# Patient Record
Sex: FEMALE | Race: WHITE | NOT HISPANIC OR LATINO | Employment: OTHER
[De-identification: names, ages, dates, MRNs, and addresses within clinical notes are randomized per-mention and may not be internally consistent; named-entity substitution may affect disease eponyms.]

---

## 2017-10-13 ENCOUNTER — HEALTH MAINTENANCE LETTER (OUTPATIENT)
Age: 37
End: 2017-10-13

## 2018-01-02 ENCOUNTER — MYC MEDICAL ADVICE (OUTPATIENT)
Dept: FAMILY MEDICINE | Facility: CLINIC | Age: 38
End: 2018-01-02

## 2018-01-02 DIAGNOSIS — O26.20 HABITUAL ABORTER, CURRENTLY PREGNANT: Primary | ICD-10-CM

## 2018-01-03 PROBLEM — O26.20 HABITUAL ABORTER, CURRENTLY PREGNANT: Status: ACTIVE | Noted: 2018-01-03

## 2018-01-03 NOTE — TELEPHONE ENCOUNTER
Patient just found out she is pregnant.  Does not have coverage through [facility] and will need to go to a different care system for the beginning of the pregnancy.  As of 3/1/2018, she states she will be transferring care back to [facility] as her insurance will be changing.  For now, she is requesting A prescription for progesterone vaginal suppositories.  She has had multiple miscarriages and when she has not used the progesterone vaginal suppositories, she has miscarried in the first trimester.  I did let her know, that it is no longer the standard of care, but with her history and increased anxiety surrounding a potential miscarriage if not using these, will go ahead and prescribe for her.  She will establish a first OB visit and begin care at [facility] and will then transfer care to [facility] in early March.    Electronically signed by Dorothy Alatorre PA-C  1/3/2018

## 2018-03-02 ENCOUNTER — ALLIED HEALTH/NURSE VISIT (OUTPATIENT)
Dept: FAMILY MEDICINE | Facility: CLINIC | Age: 38
End: 2018-03-02
Payer: COMMERCIAL

## 2018-03-02 VITALS
BODY MASS INDEX: 30.55 KG/M2 | SYSTOLIC BLOOD PRESSURE: 112 MMHG | DIASTOLIC BLOOD PRESSURE: 68 MMHG | WEIGHT: 196.25 LBS

## 2018-03-02 DIAGNOSIS — N91.2 AMENORRHEA: Primary | ICD-10-CM

## 2018-03-02 LAB — HCG UR QL: POSITIVE

## 2018-03-02 PROCEDURE — 99207 ZZC NO CHARGE NURSE ONLY: CPT

## 2018-03-02 PROCEDURE — 81025 URINE PREGNANCY TEST: CPT | Performed by: PHYSICIAN ASSISTANT

## 2018-03-02 RX ORDER — PRENATAL VIT/IRON FUM/FOLIC AC 27MG-0.8MG
1 TABLET ORAL DAILY
Qty: 1 TABLET | Refills: 0 | COMMUNITY
Start: 2018-03-02 | End: 2018-09-12

## 2018-03-02 NOTE — MR AVS SNAPSHOT
After Visit Summary   3/2/2018    Enid Cisneros    MRN: [id number]           Patient Information     Date Of Birth          1980        Visit Information        Provider Department      3/2/2018 10:30 AM  NURSE [facility]        Today's Diagnoses     Amenorrhea    -  1       Follow-ups after your visit        Who to contact     If you have questions or need follow up information about today's clinic visit or your schedule please contact [facility] directly at [phone].  Normal or non-critical lab and imaging results will be communicated to you by [employer], letter or phone within 4 business days after the clinic has received the results. If you do not hear from us within 7 days, please contact the clinic through [employer] or phone. If you have a critical or abnormal lab result, we will notify you by phone as soon as possible.  Submit refill requests through [employer] or call your pharmacy and they will forward the refill request to us. Please allow 3 business days for your refill to be completed.          Additional Information About Your Visit        MyChart Information     [employer] gives you secure access to your electronic health record. If you see a primary care provider, you can also send messages to your care team and make appointments. If you have questions, please call your primary care clinic.  If you do not have a primary care provider, please call [phone] and they will assist you.        Care EveryWhere ID     This is your Care EveryWhere ID. This could be used by other organizations to access your [facility] medical records  [id number]        Your Vitals Were     Last Period Breastfeeding? BMI (Body Mass Index)             11/23/2017 (Exact Date) No 30.55 kg/m2          Blood Pressure from Last 3 Encounters:   03/02/18 112/68   08/06/15 112/64   04/09/15 104/60    Weight from Last 3 Encounters:   03/02/18 196 lb 4 oz (89 kg)   08/06/15 186 lb (84.4  kg)   04/09/15 186 lb (84.4 kg)              We Performed the Following     HCG qualitative urine        Primary Care Provider Office Phone # Fax #    Dorothy Alatorre PA-C [phone] [address] [place] [address]        Equal Access to Services     [facility]: Hadii aad ku hadasho Soomaali, waaxda luqadaha, qaybta kaalmada adeegyada, waxay idiin hayaan adeeg khkendellsh la'allenn . So [facility] [phone].    ATENCIÓN: Si habla español, tiene a hines disposición servicios gratuitos de asistencia lingüística. Jenna al [phone].    We comply with applicable federal civil rights laws and [place] laws. We do not discriminate on the basis of race, color, national origin, age, disability, sex, sexual orientation, or gender identity.            Thank you!     Thank you for choosing [facility]  for your care. Our goal is always to provide you with excellent care. Hearing back from our patients is one way we can continue to improve our services. Please take a few minutes to complete the written survey that you may receive in the mail after your visit with us. Thank you!             Your Updated Medication List - Protect others around you: Learn how to safely use, store and throw away your medicines at www.disposemymeds.org.          This list is accurate as of 3/2/18 11:08 AM.  Always use your most recent med list.                   Brand Name Dispense Instructions for use Diagnosis    prenatal multivitamin plus iron 27-0.8 MG Tabs per tablet     1 tablet    Take 1 tablet by mouth daily    Amenorrhea

## 2018-03-05 ENCOUNTER — PRENATAL OFFICE VISIT (OUTPATIENT)
Dept: FAMILY MEDICINE | Facility: CLINIC | Age: 38
End: 2018-03-05
Payer: COMMERCIAL

## 2018-03-05 DIAGNOSIS — Z32.01 PREGNANCY, CONFIRMED, NOT FIRST: Primary | ICD-10-CM

## 2018-03-05 PROCEDURE — 99207 ZZC NO CHARGE NURSE ONLY: CPT

## 2018-03-05 NOTE — MR AVS SNAPSHOT
After Visit Summary   3/5/2018    Enid Cisneros    MRN: [id number]           Patient Information     Date Of Birth          1980        Visit Information        Provider Department      3/5/2018 1:00 PM NL OB INTAKE [facility]        Today's Diagnoses     Pregnancy, confirmed, not first    -  1       Follow-ups after your visit        Your next 10 appointments already scheduled     Mar 12, 2018  1:00 PM CDT   US OB < 14 WEEKS SINGLE with PHUS1   [facility] ([facility])    [address] [place] [address] [id number] [phone]           Please bring a list of your medicines (including vitamins, minerals and over-the-counter drugs). Also, tell your doctor about any allergies you may have. Wear comfortable clothes and leave your valuables at home.  If you re less than 20 weeks drink four 8-ounce glasses of fluid an hour before your exam. If you need to empty your bladder before your exam, try to release only a little urine. Then, drink another glass of fluid.  You may have up to two family members in the exam room. If you bring a small child, an adult must be there to care for him or her.  Please call the Imaging Department at your exam site with any questions.            Mar 12, 2018  1:45 PM CDT   LAB with NL LAB PMC   [facility]    [address] [place] [id number] [phone]           Please do not eat 10-12 hours before your appointment if you are coming in fasting for labs on lipids, cholesterol, or glucose (sugar). This does not apply to pregnant women. Water, hot tea and black coffee (with nothing added) are okay. Do not drink other fluids, diet soda or chew gum.            Mar 13, 2018  9:00 AM CDT   New Prenatal with Dorothy Alatorre PA-C   [facility]    [address] [place] [address] [id number] [phone]               Future tests that were ordered for you today     Open Standing Orders        Priority Remaining Interval Expires Ordered    Maternal quad screen Routine 1/1  5/1/2018 3/5/2018    Anti treponema EIA Routine 1/1  5/1/2018 3/5/2018    HIV Antigen Antibody Combo Routine 1/1  5/1/2018 3/5/2018    HEPATITIS B SURFACE ANTIGEN Routine 1/1  5/1/2018 3/5/2018    CBC WITH PLATELETS Routine 1/1  5/1/2018 3/5/2018    Rubella Antibody IgG Quantitative Routine 1/1  5/1/2018 3/5/2018    *UA reflex to Microscopic and Culture ([place]; [facility]; [facility]; [facility]; [facility]; [facility]; [place]; [place]) Routine 1/1  5/1/2018 3/5/2018    ABO/Rh type and screen Routine 1/1  5/1/2018 3/5/2018          Open Future Orders        Priority Expected Expires Ordered    US OB < 14 Weeks Single Routine 3/5/2018 3/5/2019 3/5/2018            Who to contact     If you have questions or need follow up information about today's clinic visit or your schedule please contact [facility] directly at [phone].  Normal or non-critical lab and imaging results will be communicated to you by [employer], letter or phone within 4 business days after the clinic has received the results. If you do not hear from us within 7 days, please contact the clinic through [employer] or phone. If you have a critical or abnormal lab result, we will notify you by phone as soon as possible.  Submit refill requests through [employer] or call your pharmacy and they will forward the refill request to us. Please allow 3 business days for your refill to be completed.          Additional Information About Your Visit        [employer] Information     [employer] gives you secure access to your electronic health record. If you see a primary care provider, you can also send messages to your care team and make appointments. If you have questions, please call your primary care clinic.  If you do not have a primary care provider, please call  [phone] and they will assist you.        Care EveryWhere ID     This is your Care EveryWhere ID. This could be used by other organizations to access your [facility] medical records  [id number]        Your Vitals Were     Last Period                   11/23/2017 (Exact Date)            Blood Pressure from Last 3 Encounters:   03/02/18 112/68   08/06/15 112/64   04/09/15 104/60    Weight from Last 3 Encounters:   03/02/18 196 lb 4 oz (89 kg)   08/06/15 186 lb (84.4 kg)   04/09/15 186 lb (84.4 kg)               Primary Care Provider Office Phone # Fax #    Dorothy Alatorre PA-C [phone] [address] [place] [address]        Equal Access to Services     [facility] [facility] : Hadii vianney sue hadasho Soomaali, waaxda luqadaha, qaybta kaalmada adeegyada, anjana salazar . So [facility] [phone].    ATENCIÓN: Si habla español, tiene a hines disposición servicios gratuitos de asistencia lingüística. Llame al [phone].    We comply with applicable federal civil rights laws and [place] laws. We do not discriminate on the basis of race, color, national origin, age, disability, sex, sexual orientation, or gender identity.            Thank you!     Thank you for choosing [facility]  for your care. Our goal is always to provide you with excellent care. Hearing back from our patients is one way we can continue to improve our services. Please take a few minutes to complete the written survey that you may receive in the mail after your visit with us. Thank you!             Your Updated Medication List - Protect others around you: Learn how to safely use, store and throw away your medicines at www.disposemymeds.org.          This list is accurate as of 3/5/18  2:59 PM.  Always use your most recent med list.                   Brand Name Dispense Instructions for use Diagnosis    prenatal multivitamin plus iron 27-0.8 MG Tabs per tablet     1 tablet    Take 1 tablet by  mouth daily    Amenorrhea

## 2018-03-05 NOTE — PROGRESS NOTES
1. Have you had chicken pox?   Yes    Genetic Screening    Has the patient, baby's father, or anyone in either family had:     1. Thalassemia and and MCV result less than 80?No   2.  Neural tube defect? No   3.  Congenital heart defect?No   4. Down's syndrome?No   5.  Rinku-Sachs disease?No   6. Sickle Cell disease or trait?No   7. Hemophilia or other inherited problems of blood?No   8. Muscular dystropy?No   9.  Cystic fibrosis?No  10. Wellington's Chorea?No  11. Mental Retardation or autism?  No         If yes, was the person tested for Fragile X?   12. Any other inherited genetic or chromosomal disorders?No  13. Metabolic disorders such as diabetes or PKU?No  14. A child born with defects not listed above?No  15. Recurrent pregnancy loss or stillbirth?No  16.  Has the patient had any medications/street drugs/alcohol since her last menstrual period?No  18.  Does the patient or baby's father have any other genetic risks. No

## 2018-03-08 ENCOUNTER — PRENATAL OFFICE VISIT (OUTPATIENT)
Dept: FAMILY MEDICINE | Facility: CLINIC | Age: 38
End: 2018-03-08
Payer: COMMERCIAL

## 2018-03-08 VITALS
WEIGHT: 194 LBS | RESPIRATION RATE: 18 BRPM | BODY MASS INDEX: 30.45 KG/M2 | HEIGHT: 67 IN | SYSTOLIC BLOOD PRESSURE: 116 MMHG | HEART RATE: 82 BPM | DIASTOLIC BLOOD PRESSURE: 72 MMHG | OXYGEN SATURATION: 100 % | TEMPERATURE: 97.9 F

## 2018-03-08 DIAGNOSIS — Z34.80 SUPERVISION OF OTHER NORMAL PREGNANCY, ANTEPARTUM: Primary | ICD-10-CM

## 2018-03-08 DIAGNOSIS — Z34.92 ENCOUNTER FOR SUPERVISION OF NORMAL PREGNANCY IN SECOND TRIMESTER: ICD-10-CM

## 2018-03-08 LAB
ABO + RH BLD: NORMAL
ABO + RH BLD: NORMAL
ALBUMIN UR-MCNC: NEGATIVE MG/DL
APPEARANCE UR: ABNORMAL
BACTERIA #/AREA URNS HPF: ABNORMAL /HPF
BILIRUB UR QL STRIP: NEGATIVE
BLD GP AB SCN SERPL QL: NORMAL
BLOOD BANK CMNT PATIENT-IMP: NORMAL
COLOR UR AUTO: YELLOW
ERYTHROCYTE [DISTWIDTH] IN BLOOD BY AUTOMATED COUNT: 13.3 % (ref 10–15)
GLUCOSE UR STRIP-MCNC: NEGATIVE MG/DL
HCT VFR BLD AUTO: 39.4 % (ref 35–47)
HGB BLD-MCNC: 12.8 G/DL (ref 11.7–15.7)
HGB UR QL STRIP: NEGATIVE
KETONES UR STRIP-MCNC: 5 MG/DL
LEUKOCYTE ESTERASE UR QL STRIP: ABNORMAL
MCH RBC QN AUTO: 30.2 PG (ref 26.5–33)
MCHC RBC AUTO-ENTMCNC: 32.5 G/DL (ref 31.5–36.5)
MCV RBC AUTO: 93 FL (ref 78–100)
MUCOUS THREADS #/AREA URNS LPF: PRESENT /LPF
NITRATE UR QL: NEGATIVE
PH UR STRIP: 6 PH (ref 5–7)
PLATELET # BLD AUTO: 207 10E9/L (ref 150–450)
RBC # BLD AUTO: 4.24 10E12/L (ref 3.8–5.2)
RBC #/AREA URNS AUTO: 1 /HPF (ref 0–2)
SOURCE: ABNORMAL
SP GR UR STRIP: 1.02 (ref 1–1.03)
SPECIMEN EXP DATE BLD: NORMAL
SQUAMOUS #/AREA URNS AUTO: 8 /HPF (ref 0–1)
UROBILINOGEN UR STRIP-MCNC: 0 MG/DL (ref 0–2)
WBC # BLD AUTO: 10.3 10E9/L (ref 4–11)
WBC #/AREA URNS AUTO: 4 /HPF (ref 0–5)

## 2018-03-08 PROCEDURE — 86901 BLOOD TYPING SEROLOGIC RH(D): CPT | Performed by: FAMILY MEDICINE

## 2018-03-08 PROCEDURE — 87591 N.GONORRHOEAE DNA AMP PROB: CPT | Performed by: PHYSICIAN ASSISTANT

## 2018-03-08 PROCEDURE — 99207 ZZC FIRST OB VISIT: CPT | Performed by: PHYSICIAN ASSISTANT

## 2018-03-08 PROCEDURE — 87624 HPV HI-RISK TYP POOLED RSLT: CPT | Performed by: PHYSICIAN ASSISTANT

## 2018-03-08 PROCEDURE — 36415 COLL VENOUS BLD VENIPUNCTURE: CPT | Performed by: FAMILY MEDICINE

## 2018-03-08 PROCEDURE — 87086 URINE CULTURE/COLONY COUNT: CPT | Performed by: PHYSICIAN ASSISTANT

## 2018-03-08 PROCEDURE — 86762 RUBELLA ANTIBODY: CPT | Performed by: FAMILY MEDICINE

## 2018-03-08 PROCEDURE — 87491 CHLMYD TRACH DNA AMP PROBE: CPT | Performed by: PHYSICIAN ASSISTANT

## 2018-03-08 PROCEDURE — 81001 URINALYSIS AUTO W/SCOPE: CPT | Performed by: FAMILY MEDICINE

## 2018-03-08 PROCEDURE — 86780 TREPONEMA PALLIDUM: CPT | Performed by: FAMILY MEDICINE

## 2018-03-08 PROCEDURE — 87389 HIV-1 AG W/HIV-1&-2 AB AG IA: CPT | Performed by: FAMILY MEDICINE

## 2018-03-08 PROCEDURE — 85027 COMPLETE CBC AUTOMATED: CPT | Performed by: FAMILY MEDICINE

## 2018-03-08 PROCEDURE — 87340 HEPATITIS B SURFACE AG IA: CPT | Performed by: FAMILY MEDICINE

## 2018-03-08 PROCEDURE — G0145 SCR C/V CYTO,THINLAYER,RESCR: HCPCS | Performed by: PHYSICIAN ASSISTANT

## 2018-03-08 PROCEDURE — 86850 RBC ANTIBODY SCREEN: CPT | Performed by: FAMILY MEDICINE

## 2018-03-08 PROCEDURE — 86900 BLOOD TYPING SEROLOGIC ABO: CPT | Performed by: FAMILY MEDICINE

## 2018-03-08 ASSESSMENT — PAIN SCALES - GENERAL: PAINLEVEL: NO PAIN (0)

## 2018-03-08 NOTE — PROGRESS NOTES
Enid Cisneros is a 37 year old  who presents to the clinic for an new ob visit.         Estimated Date of Delivery: Aug 30, 2018    FIRST OB LABS RETURNED hasn't done them yet   EARLY US WAS DONE AND RETURNED NORMAL CONFIRMING BEVERLY NOTED ABOVE.     She has not had bleeding since her LMP.   She has had mild nausea. Weigh loss has not occurred.   This was not a planned pregnancy.   OTHER CONCERNS: none   STI HISTORY : no         Patient Active Problem List   Diagnosis     CARDIOVASCULAR SCREENING; LDL GOAL LESS THAN 160     Mass     PAC (premature atrial contraction)     PVC (premature ventricular contraction)     Habitual aborter, currently pregnant     Past Medical History:   Diagnosis Date     Closed fracture of unspecified part of humerus 1981    fx of radius     Lyme disease     history of     PAC (premature atrial contraction) 8/2013    Holter     PVC (premature ventricular contraction) 8/2013    Holter     Past Surgical History:   Procedure Laterality Date     LAMINECT/DISCECTOMY, LUMBAR  2002     TONSILLECTOMY  2009     Current Outpatient Prescriptions   Medication Sig Dispense Refill     Prenatal Vit-Fe Fumarate-FA (PRENATAL MULTIVITAMIN PLUS IRON) 27-0.8 MG TABS per tablet Take 1 tablet by mouth daily 1 tablet 0       ====================================================  PERSONAL/SOCIAL HISTORY  Social History     Social History     Marital status:      Spouse name: Didier     Number of children: 1     Years of education: N/A     Occupational History      Unemployed     Social History Main Topics     Smoking status: Never Smoker     Smokeless tobacco: Never Used     Alcohol use No     Drug use: No     Sexual activity: Yes     Partners: Male     Other Topics Concern      Service No     Blood Transfusions No     Caffeine Concern No     Occupational Exposure No     Stay-at-home mom     Hobby Hazards No     Sleep Concern No     Stress Concern No     Weight Concern No     Special Diet No     Back  Care Yes     Herniated disc/discectomy - 2002     Exercise Yes     3-4 times a week     Seat Belt Yes     Self-Exams No     Social History Narrative     and lives in [place] with , Didier and daughters, Margaret and Lamar.  No smokers in the home.  Outdoor cats.  No concerns about domestic violence.     =====================================================   REVIEW OF SYSTEMS  C: NEGATIVE for fever, chills, change in weight  I: NEGATIVE for worrisome rashes, moles or lesions  E: NEGATIVE for vision changes or irritation  ENT: NEGATIVE for ear, mouth and throat problems  R: NEGATIVE for significant cough or SOB  B: NEGATIVE for masses, tenderness or discharge  CV: NEGATIVE for chest pain, palpitations or peripheral edema  GI: NEGATIVE for nausea, abdominal pain, heartburn, or change in bowel habits  : NEGATIVE for unusual urinary or vaginal symptoms. Periods are regular.  M: NEGATIVE for significant arthralgias or myalgia  N: NEGATIVE for weakness, dizziness or paresthesias  E: NEGATIVE for temperature intolerance, skin/hair changes  H: NEGATIVE for bleeding problems  P: NEGATIVE for changes in mood or affect  ====================================================  PHYSICAL EXAM:  LMP 11/23/2017 (Exact Date)        GENERAL:  Pleasant pregnant female, alert, well groomed.  SKIN:  Warm and dry, without lesions or rashes  HEAD: Symmetrical features.  EYES:  PERRLA,   MOUTH:  Buccal mucosa pink, moist without lesions.    NECK:  Thyroid without enlargement and nodules.  Lymph nodes not palpable.   LUNGS:  Clear to auscultation.  BREAST: patient declined breast exam      HEART:  RRR without murmur.  ABDOMEN: Soft without masses , tenderness or organomegaly.  No CVA tenderness. No scars noted..   MUSCULOSKELETAL:  Full range of motion  EXTREMITIES:  No edema. No significant varicosities.   GENITALIA:  BUS WNL, no lesions noted   VAGINA:  Pink, normal rugae and discharge normal and physiologic,    CERVIX:  smooth, without discharge or CMT and parous os,   firm/ closed 2 cm long.  PELVIC: Deferred due to recent ultrasound.      =========================================  ASSESSMENT/PLAN  Supervision of other normal pregnancy, antepartum        RECOMMENDED WEIGHT GAIN: 15-25 lbs.  Given her age, it was suggested to consider nuchal she declined.  Will move forward with maternal quad study.  She is aware that there is higher risk particularly and trisomy abnormality as she is greater than age 35.  Instructed on best evidence for: weight gain for her BMI for pregnancy; healthy diet and foods to avoid; exercise and activity during pregnancy;avoiding exposure to toxoplasmosis; and maintenance of a generally healthy lifestyle.   Discussed the harms, benefits, side effects and alternative therapies for current prescribed and OTC medications.    Will see her again in 4 weeks - she should consider maternal quad testing at that time.     No orders of the defined types were placed in this encounter.        AVS given to patient upon discharge today.  Electronically signed by Dorothy Alatorre PA-C  March 8, 2018  3:27 PM

## 2018-03-08 NOTE — NURSING NOTE
"Chief Complaint   Patient presents with     Prenatal Care       Initial /72  Pulse 82  Temp 97.9  F (36.6  C) (Tympanic)  Resp 18  Ht 5' 7\" (1.702 m)  Wt 194 lb (88 kg)  LMP 11/23/2017 (Exact Date)  SpO2 100%  BMI 30.38 kg/m2 Estimated body mass index is 30.38 kg/(m^2) as calculated from the following:    Height as of this encounter: 5' 7\" (1.702 m).    Weight as of this encounter: 194 lb (88 kg).  BP completed using cuff size: manda Arevalo MA      "

## 2018-03-08 NOTE — MR AVS SNAPSHOT
After Visit Summary   3/8/2018    Enid Cisneros    MRN: [id number]           Patient Information     Date Of Birth          1980        Visit Information        Provider Department      3/8/2018 3:30 PM Dorothy Alatorre PA-C [facility]        Today's Diagnoses     Supervision of other normal pregnancy, antepartum    -  1       Follow-ups after your visit        Your next 10 appointments already scheduled     Mar 12, 2018  1:00 PM CDT   US OB < 14 WEEKS SINGLE with PHUS1   [facility] Ultrasound ([facility])    [address] [place] [address]   [phone]           Please bring a list of your medicines (including vitamins, minerals and over-the-counter drugs). Also, tell your doctor about any allergies you may have. Wear comfortable clothes and leave your valuables at home.  If you re less than 20 weeks drink four 8-ounce glasses of fluid an hour before your exam. If you need to empty your bladder before your exam, try to release only a little urine. Then, drink another glass of fluid.  You may have up to two family members in the exam room. If you bring a small child, an adult must be there to care for him or her.  Please call the Imaging Department at your exam site with any questions.            Mar 12, 2018  1:45 PM CDT   LAB with NL LAB [facility] ([facility])    [address] [place] [id number] [phone]           Please do not eat 10-12 hours before your appointment if you are coming in fasting for labs on lipids, cholesterol, or glucose (sugar). This does not apply to pregnant women. Water, hot tea and black coffee (with nothing added) are okay. Do not drink other fluids, diet soda or chew gum.            Apr 11, 2018 10:30 AM CDT   ESTABLISHED PRENATAL with Dorothy Alatorre PA-C   [facility] ([facility])    [address] [place] [address]  [address]   [phone]            May 09, 2018 10:00 AM CDT   ESTABLISHED PRENATAL with Dorothy Alatorre PA-C   [facility] ([facility])    [address] [place] [address]   [phone]            Jun 06, 2018 10:30 AM CDT   ESTABLISHED PRENATAL with Dorothy Alatorre PA-C   [facility] ([facility])    [address] [place] [address]   [phone]            Jun 20, 2018 10:00 AM CDT   ESTABLISHED PRENATAL with Dorothy Alatorre PA-C   [facility] ([facility])    [address] [place] [address]   [phone]            Jul 05, 2018 10:00 AM CDT   ESTABLISHED PRENATAL with Dorothy Alatorre PA-C   [facility] ([facility])    [address] [place] [address]   [phone]            Jul 18, 2018 10:00 AM CDT   ESTABLISHED PRENATAL with Dorothy Alatorre PA-C   [facility] ([facility])    [address] [place] [address]   [phone]            Aug 01, 2018 10:00 AM CDT   ESTABLISHED PRENATAL with Dorothy Alatorre PA-C   [facility] ([facility])    [address] [place] [address]   [phone]            Aug 08, 2018 10:00 AM CDT   ESTABLISHED PRENATAL with Dorothy Alatorre PA-C   [facility] ([facility])    [address] [place] [address]   [phone]              Future tests that were ordered for you today     Open Future Orders        Priority Expected Expires Ordered    Anti Treponema Routine  6/8/2018 3/8/2018    OB hemoglobin Routine  6/8/2018 3/8/2018    Glucose tolerance, gest screen, 1 hour Routine  6/8/2018 3/8/2018            Who to contact     If you have questions or need follow up information about today's clinic visit or your schedule please contact [facility]  "directly at [phone].  Normal or non-critical lab and imaging results will be communicated to you by MyChart, letter or phone within 4 business days after the clinic has received the results. If you do not hear from us within 7 days, please contact the clinic through [employer] or phone. If you have a critical or abnormal lab result, we will notify you by phone as soon as possible.  Submit refill requests through [employer] or call your pharmacy and they will forward the refill request to us. Please allow 3 business days for your refill to be completed.          Additional Information About Your Visit        [employer] Information     [employer] gives you secure access to your electronic health record. If you see a primary care provider, you can also send messages to your care team and make appointments. If you have questions, please call your primary care clinic.  If you do not have a primary care provider, please call [phone] and they will assist you.        Care EveryWhere ID     This is your Care EveryWhere ID. This could be used by other organizations to access your [facility] medical records  [id number]        Your Vitals Were     Pulse Temperature Respirations Height Last Period Pulse Oximetry    82 97.9  F (36.6  C) (Tympanic) 18 5' 7\" (1.702 m) 11/23/2017 (Exact Date) 100%    BMI (Body Mass Index)                   30.38 kg/m2            Blood Pressure from Last 3 Encounters:   03/08/18 116/72   03/02/18 112/68   08/06/15 112/64    Weight from Last 3 Encounters:   03/08/18 194 lb (88 kg)   03/02/18 196 lb 4 oz (89 kg)   08/06/15 186 lb (84.4 kg)              We Performed the Following     *UA reflex to Microscopic and Culture ([place]; [facility]; [facility]; [facility]; [facility]; [facility]; [place]; [place])     ABO/Rh type and screen     Anti treponema EIA     ARUP Miscellaneous Test     CBC WITH PLATELETS     Chlamydia trachomatis PCR     HEPATITIS B SURFACE ANTIGEN     HIV Antigen " Antibody Combo     HPV High Risk Types DNA Cervical     Neisseria gonorrhoeae PCR     Pap imaged thin layer screen with HPV - recommended age 30 - 65 years (select HPV order below)     Rubella Antibody IgG Quantitative     Urine Culture Aerobic Bacterial        Primary Care Provider Office Phone # Fax #    Dorothy Alatorre PA-C [phone] [address] [place] [address]        Equal Access to Services     [facility] : Hadii aad ku hadasho Soomaali, waaxda luqadaha, qaybta kaalmada adeegyada, waxay idiin hayaan adeeg kharash la'aan . So [facility] [phone].    ATENCIÓN: Si habla español, tiene a hines disposición servicios gratuitos de asistencia lingüística. Llame al [phone].    We comply with applicable federal civil rights laws and [place] laws. We do not discriminate on the basis of race, color, national origin, age, disability, sex, sexual orientation, or gender identity.            Thank you!     Thank you for choosing [facility]  for your care. Our goal is always to provide you with excellent care. Hearing back from our patients is one way we can continue to improve our services. Please take a few minutes to complete the written survey that you may receive in the mail after your visit with us. Thank you!             Your Updated Medication List - Protect others around you: Learn how to safely use, store and throw away your medicines at www.disposemymeds.org.          This list is accurate as of 3/8/18  5:49 PM.  Always use your most recent med list.                   Brand Name Dispense Instructions for use Diagnosis    prenatal multivitamin plus iron 27-0.8 MG Tabs per tablet     1 tablet    Take 1 tablet by mouth daily    Amenorrhea

## 2018-03-09 LAB
BACTERIA SPEC CULT: NO GROWTH
C TRACH DNA SPEC QL NAA+PROBE: NEGATIVE
HBV SURFACE AG SERPL QL IA: NONREACTIVE
HIV 1+2 AB+HIV1 P24 AG SERPL QL IA: NONREACTIVE
Lab: NORMAL
N GONORRHOEA DNA SPEC QL NAA+PROBE: NEGATIVE
RUBV IGG SERPL IA-ACNC: 94 IU/ML
SPECIMEN SOURCE: NORMAL
T PALLIDUM IGG+IGM SER QL: NEGATIVE

## 2018-03-09 NOTE — NURSING NOTE
20 week US set up on Wednesday, April 18th, check in at 10:15 am for US at 10:30 am. Sandy Bernard LPN

## 2018-03-12 ENCOUNTER — HOSPITAL ENCOUNTER (OUTPATIENT)
Dept: ULTRASOUND IMAGING | Facility: CLINIC | Age: 38
Discharge: HOME | End: 2018-03-12
Attending: PHYSICIAN ASSISTANT | Admitting: PHYSICIAN ASSISTANT
Payer: COMMERCIAL

## 2018-03-12 DIAGNOSIS — Z34.80 SUPERVISION OF OTHER NORMAL PREGNANCY, ANTEPARTUM: Primary | ICD-10-CM

## 2018-03-12 DIAGNOSIS — Z34.80 SUPERVISION OF OTHER NORMAL PREGNANCY, ANTEPARTUM: ICD-10-CM

## 2018-03-12 DIAGNOSIS — Z32.01 PREGNANCY, CONFIRMED, NOT FIRST: ICD-10-CM

## 2018-03-12 PROBLEM — O44.42 LOW-LYING PLACENTA IN SECOND TRIMESTER: Status: ACTIVE | Noted: 2018-03-12

## 2018-03-12 LAB
COPATH REPORT: NORMAL
GLUCOSE 1H P 50 G GLC PO SERPL-MCNC: 93 MG/DL (ref 60–129)
HGB BLD-MCNC: 12.4 G/DL (ref 11.7–15.7)
PAP: NORMAL

## 2018-03-12 PROCEDURE — 86780 TREPONEMA PALLIDUM: CPT | Performed by: PHYSICIAN ASSISTANT

## 2018-03-12 PROCEDURE — 00000218 ZZHCL STATISTIC OBHBG - HEMOGLOBIN: Performed by: PHYSICIAN ASSISTANT

## 2018-03-12 PROCEDURE — 76815 OB US LIMITED FETUS(S): CPT

## 2018-03-12 PROCEDURE — 36415 COLL VENOUS BLD VENIPUNCTURE: CPT | Performed by: PHYSICIAN ASSISTANT

## 2018-03-12 PROCEDURE — 82950 GLUCOSE TEST: CPT | Performed by: PHYSICIAN ASSISTANT

## 2018-03-13 LAB — T PALLIDUM IGG+IGM SER QL: NEGATIVE

## 2018-03-14 LAB
FINAL DIAGNOSIS: NORMAL
HPV HR 12 DNA CVX QL NAA+PROBE: NEGATIVE
HPV16 DNA SPEC QL NAA+PROBE: NEGATIVE
HPV18 DNA SPEC QL NAA+PROBE: NEGATIVE
SPECIMEN DESCRIPTION: NORMAL
SPECIMEN SOURCE CVX/VAG CYTO: NORMAL

## 2018-03-14 NOTE — PROGRESS NOTES
Enid, all your lab tests are normal.   We can review them in detail your next visit.   Please call or mychart with any questions.   Holli Meng MD

## 2018-03-16 LAB
RESULT: NORMAL
SEND OUTS MISC TEST CODE: NORMAL
SEND OUTS MISC TEST SPECIMEN: NORMAL
TEST NAME: NORMAL

## 2018-03-31 ENCOUNTER — OFFICE VISIT (OUTPATIENT)
Dept: URGENT CARE | Facility: RETAIL CLINIC | Age: 38
End: 2018-03-31
Payer: COMMERCIAL

## 2018-03-31 VITALS
HEART RATE: 96 BPM | TEMPERATURE: 96.9 F | DIASTOLIC BLOOD PRESSURE: 65 MMHG | SYSTOLIC BLOOD PRESSURE: 111 MMHG | OXYGEN SATURATION: 99 %

## 2018-03-31 DIAGNOSIS — J01.00 ACUTE NON-RECURRENT MAXILLARY SINUSITIS: Primary | ICD-10-CM

## 2018-03-31 DIAGNOSIS — J00 COMMON COLD: ICD-10-CM

## 2018-03-31 DIAGNOSIS — R05.9 COUGH: ICD-10-CM

## 2018-03-31 PROCEDURE — 99213 OFFICE O/P EST LOW 20 MIN: CPT | Performed by: NURSE PRACTITIONER

## 2018-03-31 NOTE — PROGRESS NOTES
"    SUBJECTIVE:  Enid Cisneros is a 38 year old female who presents to the clinic today with a chief complaint of cough, shortness of breath and tightness for 2-3 day(s).  Her cough is described as productive of yellow sputum and spasmodic.    The patient's symptoms are moderate and not changing over the course of time.  Associated symptoms include congestion, mild fever, nasal congestion, rhinorrhea, malaise, shortness of breath, sore throat, headache and \"feels hard to breath\". The patient's symptoms are exacerbated by cold air and exercise.  Patient has been using rest to improve symptoms.    Past Medical History:   Diagnosis Date     Closed fracture of unspecified part of humerus 1981    fx of radius     Lyme disease     history of     PAC (premature atrial contraction) 8/2013    Holter     PVC (premature ventricular contraction) 8/2013    Holter     Current Outpatient Prescriptions   Medication Sig Dispense Refill     Prenatal Vit-Fe Fumarate-FA (PRENATAL MULTIVITAMIN PLUS IRON) 27-0.8 MG TABS per tablet Take 1 tablet by mouth daily 1 tablet 0     History   Smoking Status     Never Smoker   Smokeless Tobacco     Never Used       ROS  Review of systems negative except as stated above.    OBJECTIVE:  /65 (BP Location: Right arm, Patient Position: Chair, Cuff Size: Adult Regular)  Pulse 96  Temp 96.9  F (36.1  C) (Tympanic)  LMP 11/23/2017 (Exact Date)  SpO2 99%  GENERAL APPEARANCE: alert, active, moderate distress and cooperative  EYES: EOMI,  PERRL, conjunctiva clear  HENT: ear canals and TM's mostly normal.  Nose congested /c maxillary sinus tenderness. Mouth without ulcers, erythema or lesions (some post nasal drainage).  NECK: supple, nontender, no lymphadenopathy  RESP: inspiratory wheezes R lower posterior  CV: regular rates and rhythm, normal S1 S2, no murmur noted  ABDOMEN:  soft, nontender, no HSM or masses and bowel sounds normal  NEURO: Normal strength and tone, sensory exam grossly normal,  " normal speech and mentation  SKIN: no suspicious lesions or rashes    ASSESSMENT:     Common cold  Cough  Acute non-recurrent maxillary sinusitis      PLAN:  Discussed options will follow-up PRN  Get plenty of rest & drink plenty of fluids (mainly water).  Take OTC, or medications prescribed to treat symptoms.  Mucinex is product known to help loosen congestion (generics are available.).   Dark Honey, such as Gayle Wheat Honey has been shown to be helpful in cough management.  Avoid smoke (cigarettes or fireplace/wood burning stoves).  If you develop trouble breathing, swallowing or cough-up blood, immediately go to ER.  Using a vaporizer, humidifier, or steam from hot water to add moisture to the air can help  Follow-up with primary care provider if not improving with in 3 days or symptoms worsen.  A cough may last up to 2 weeks.    Fermín Brandon MSN, APRN, Family NP-C  [facility]  March 31, 2018

## 2018-03-31 NOTE — MR AVS SNAPSHOT
After Visit Summary   3/31/2018    Enid Cisneros    MRN: [id number]           Patient Information     Date Of Birth          1980        Visit Information        Provider Department      3/31/2018 12:40 PM Fermín Brandon APRN [facility]        Today's Diagnoses     Acute non-recurrent maxillary sinusitis    -  1    Common cold        Cough           Follow-ups after your visit        Your next 10 appointments already scheduled     Apr 11, 2018 10:30 AM CDT   ESTABLISHED PRENATAL with Dorothy Alatorre PA-C   [facility] ([facility])    [address] [place] [address]   [phone]            Apr 18, 2018 10:30 AM CDT   (Arrive by 10:15 AM)   US OB > 14 WEEKS COMPLETE SINGLE with PHUS1   [facility] Ultrasound ([facility])    [address] [place] [address]   [phone]           Please bring a list of your medicines (including vitamins, minerals and over-the-counter drugs). Also, tell your doctor about any allergies you may have. Wear comfortable clothes and leave your valuables at home.  If you re less than 20 weeks drink four 8-ounce glasses of fluid an hour before your exam. If you need to empty your bladder before your exam, try to release only a little urine. Then, drink another glass of fluid.  You may have up to two family members in the exam room. If you bring a small child, an adult must be there to care for him or her.  Please call the Imaging Department at your exam site with any questions.            May 09, 2018 10:00 AM CDT   ESTABLISHED PRENATAL with Dorothy Alatorre PA-C   [facility] ([facility])    [address] [place] [address]   [phone]            Jun 06, 2018 10:30 AM CDT   ESTABLISHED PRENATAL with Dorothy Alatorre PA-C   [facility] ([address]  [facility] [address]   [phone]            Jun 20, 2018 10:00 AM CDT   ESTABLISHED PRENATAL with Dorothy Alatorre PA-C   [facility]    [address] [place] [address]   [phone]            Jul 05, 2018 10:00 AM CDT   ESTABLISHED PRENATAL with Dorothy Alatorre PA-C   [facility])    [address] [place] [address]   [phone]            Jul 18, 2018 10:00 AM CDT   ESTABLISHED PRENATAL with Dorothy Alatorre PA-C   [facility] ([facility])    [address] [place] [address]   [phone]            Aug 01, 2018 10:00 AM CDT   ESTABLISHED PRENATAL with Dorothy Alatorre PA-C   [facility] ([facility])    [address] [place] [address]   [phone]            Aug 08, 2018 10:00 AM CDT   ESTABLISHED PRENATAL with Dorothy Alatorre PA-C   [facility] ([facility])    [address] [place] [address]   [phone]            Aug 15, 2018 10:00 AM CDT   ESTABLISHED PRENATAL with Dorothy Alatorre PA-C   [facility] ([facility])    [address] [place] [address]   [phone]              Who to contact     You can reach your care team any time of the day by calling [phone].  Notification of test results:  If you have an abnormal lab result, we will notify you by phone as soon as possible.         Additional Information About Your Visit        [employer] Information     [employer] gives you secure access to your electronic health record. If you see a primary care provider, you can also send messages to your care team and make appointments. If you have questions, please call your primary care clinic.  If you do not have a primary care provider, please call [phone] and they will assist you.        Care  EveryWhere ID     This is your Care EveryWhere ID. This could be used by other organizations to access your [place] medical records  [id number]        Your Vitals Were     Pulse Temperature Last Period Pulse Oximetry          96 96.9  F (36.1  C) (Tympanic) 11/23/2017 (Exact Date) 99%         Blood Pressure from Last 3 Encounters:   03/31/18 111/65   03/08/18 116/72   03/02/18 112/68    Weight from Last 3 Encounters:   03/08/18 194 lb (88 kg)   03/02/18 196 lb 4 oz (89 kg)   08/06/15 186 lb (84.4 kg)              Today, you had the following     No orders found for display       Primary Care Provider Office Phone # Fax #    Dorothy Alatorre PA-C [phone] [address] [place] [address]        Equal Access to Services     [facility]: Hadii vianney sue hadasho Soomaali, waaxda luqadaha, qaybta kaalmada adeegyada, anjana vences hayamelie salazar . So [facility] [phone].    ATENCIÓN: Si habla español, tiene a hines disposición servicios gratuitos de asistencia lingüística. Llame al [phone].    We comply with applicable federal civil rights laws and [place] laws. We do not discriminate on the basis of race, color, national origin, age, disability, sex, sexual orientation, or gender identity.            Thank you!     Thank you for choosing [facility]  for your care. Our goal is always to provide you with excellent care. Hearing back from our patients is one way we can continue to improve our services. Please take a few minutes to complete the written survey that you may receive in the mail after your visit with us. Thank you!             Your Updated Medication List - Protect others around you: Learn how to safely use, store and throw away your medicines at www.disposemymeds.org.          This list is accurate as of 3/31/18 12:59 PM.  Always use your most recent med list.                   Brand Name Dispense Instructions for use Diagnosis    prenatal  multivitamin plus iron 27-0.8 MG Tabs per tablet     1 tablet    Take 1 tablet by mouth daily    Amenorrhea

## 2018-03-31 NOTE — NURSING NOTE
"Chief Complaint   Patient presents with     Cough     cough x 3 days- states chest is tight      Sinus Problem     sinus pressure and congestion x 3 days     Nasal Congestion     nasal congestion x 3 days       Initial /65 (BP Location: Right arm, Patient Position: Chair, Cuff Size: Adult Regular)  Pulse 96  Temp 96.9  F (36.1  C) (Tympanic)  LMP 11/23/2017 (Exact Date)  SpO2 99% Estimated body mass index is 30.38 kg/(m^2) as calculated from the following:    Height as of 3/8/18: 5' 7\" (1.702 m).    Weight as of 3/8/18: 194 lb (88 kg).  Medication Reconciliation: complete     Jessica Sundet      "

## 2018-04-11 ENCOUNTER — PRENATAL OFFICE VISIT (OUTPATIENT)
Dept: FAMILY MEDICINE | Facility: CLINIC | Age: 38
End: 2018-04-11
Payer: COMMERCIAL

## 2018-04-11 VITALS
SYSTOLIC BLOOD PRESSURE: 110 MMHG | TEMPERATURE: 97.2 F | RESPIRATION RATE: 18 BRPM | HEART RATE: 80 BPM | BODY MASS INDEX: 31.48 KG/M2 | WEIGHT: 201 LBS | DIASTOLIC BLOOD PRESSURE: 70 MMHG

## 2018-04-11 DIAGNOSIS — J00 NASOPHARYNGITIS INFECTIVE: ICD-10-CM

## 2018-04-11 DIAGNOSIS — O44.42 LOW-LYING PLACENTA IN SECOND TRIMESTER: ICD-10-CM

## 2018-04-11 DIAGNOSIS — Z34.80 SUPERVISION OF OTHER NORMAL PREGNANCY, ANTEPARTUM: Primary | ICD-10-CM

## 2018-04-11 PROCEDURE — 99213 OFFICE O/P EST LOW 20 MIN: CPT | Performed by: PHYSICIAN ASSISTANT

## 2018-04-11 RX ORDER — CEPHALEXIN 500 MG/1
500 CAPSULE ORAL 3 TIMES DAILY
Qty: 30 CAPSULE | Refills: 0 | Status: SHIPPED | OUTPATIENT
Start: 2018-04-11 | End: 2018-04-21

## 2018-04-11 ASSESSMENT — PAIN SCALES - GENERAL: PAINLEVEL: NO PAIN (0)

## 2018-04-11 NOTE — PROGRESS NOTES
Doing well with the pregnancy, but she is experiencing URI symptoms and cough that have been ongoing for greater than 2 weeks.  See second SOAP note regarding this illness-  taking PNV.  Discussed quad screening. Prefer to do this test prior to 20 week ultrasound.  Patient wishes to defer. If quad study returns abnormal will do Level II ultrasound at the [facility].   20 week US for full fetal anatomical survey ordered today.  RTC in 4 weeks    Electronically signed by Dorothy Alatorre PA-C  4/11/2018

## 2018-04-11 NOTE — PROGRESS NOTES
SUBJECTIVE:   Enid Cisneros is a 38 year old female currently almost 20 weeks pregnant who presents to clinic today for the following health issues:      Acute Illness   Acute illness concerns: Congestion, sore throat, postnasal drainage, sinus pain, cough  Onset: Almost 3 weeks ago    Fever: YES-beginning of illness had low-grade fever has resolved over the course of the last 7-10 days    Chills/Sweats: no    Headache (location?): YES-frontal    Sinus Pressure:YES-maxillary and frontal    Conjunctivitis:  no    Ear Pain: no    Rhinorrhea: YES    Congestion: YES    Sore Throat: YES     Cough: YES-both productive and nonproductive-seems to be more productive during the day    Wheeze: no    Decreased Appetite: no    Nausea: no    Vomiting: no    Diarrhea:  no    Dysuria/Freq.: no    Fatigue/Achiness: YES-fatigue    Sick/Strep Exposure: no     Therapies Tried and outcome: None-wonders what is safe in pregnancy.          Problem list and histories reviewed & adjusted, as indicated.  Additional history: as documented    Patient Active Problem List   Diagnosis     CARDIOVASCULAR SCREENING; LDL GOAL LESS THAN 160     Mass     PAC (premature atrial contraction)     PVC (premature ventricular contraction)     Habitual aborter, currently pregnant     Supervision of other normal pregnancy, antepartum     Low-lying placenta in second trimester     Past Surgical History:   Procedure Laterality Date     LAMINECT/DISCECTOMY, LUMBAR  2002     TONSILLECTOMY  2009       Social History   Substance Use Topics     Smoking status: Never Smoker     Smokeless tobacco: Never Used     Alcohol use No     Family History   Problem Relation Age of Onset     CANCER Mother      breast     No Known Problems Father      CEREBROVASCULAR DISEASE Maternal Grandmother      DIABETES Maternal Grandmother      adult-onset     HEART DISEASE Maternal Grandfather      CANCER Paternal Grandmother      HEART DISEASE Paternal Grandfather      No Known  Problems Brother            Reviewed and updated as needed this visit by clinical staff  Tobacco  Allergies  Meds       Reviewed and updated as needed this visit by Provider         ROS:  Constitutional, HEENT, cardiovascular, pulmonary, gi and gu systems are negative, except as otherwise noted.    OBJECTIVE:     /70  Pulse 80  Temp 97.2  F (36.2  C) (Temporal)  Resp 18  Wt 201 lb (91.2 kg)  LMP 11/23/2017 (Exact Date)  BMI 31.48 kg/m2  Body mass index is 31.48 kg/(m^2).   GENERAL: healthy, alert and no distress  EYES: Eyes grossly normal to inspection, PERRL and conjunctivae and sclerae normal  HENT: ear canals and TM's normal, nasal mucosa edematous , oral mucous membranes moist, sinuses: maxillary tenderness on both sides and oropharynx is edematous/erythematous without exudate or asymmetry.  Increased postnasal drainage that is purulent in nature.  NECK: no adenopathy, no asymmetry, masses, or scars and thyroid normal to palpation  RESP: lungs clear to auscultation - no rales, rhonchi or wheezes  CV: regular rate and rhythm, normal S1 S2, no S3 or S4, no murmur, click or rub, no peripheral edema and peripheral pulses strong  MS: no gross musculoskeletal defects noted, no edema  SKIN: no suspicious lesions or rashes    Diagnostic Test Results:  none     ASSESSMENT:   Infectious nasopharyngitis    PLAN:       ICD-10-CM    1. Nasopharyngitis infective J00 cephALEXin (KEFLEX) 500 MG capsule   2. Low-lying placenta in second trimester O44.42            MEDICATIONS:        - Start taking cephalexin-see orders.       - Continue other medications without change  FUTURE APPOINTMENTS:       - Follow-up visit in 1 month for prenatal care  Gave her a list of products safe in pregnancy to use for her symptoms particularly cough.  Continue with pushing lots of water as she is doing.    Dorothy Alatorre PA-C  [facility]  Orders Placed This Encounter     cephALEXin (KEFLEX) 500 MG capsule        AVS given to patient upon discharge today.  Electronically signed by Dorothy Alatorre PA-C  April 11, 2018  11:00 AM

## 2018-04-11 NOTE — MR AVS SNAPSHOT
After Visit Summary   4/11/2018    Enid Cisneros    MRN: [id number]           Patient Information     Date Of Birth          1980        Visit Information        Provider Department      4/11/2018 10:30 AM Dorothy Alatorre PA-C [facility]        Today's Diagnoses     Nasopharyngitis infective    -  1    Low-lying placenta in second trimester          Care Instructions    Mucinex (plain one) - no Decongestant or Sudafed for daytime    Robitussin DM for night time cough          Follow-ups after your visit        Your next 10 appointments already scheduled     Apr 18, 2018 10:30 AM CDT   (Arrive by 10:15 AM)   US OB > 14 WEEKS COMPLETE SINGLE with PHUS1   [facility] Ultrasound ([facility])    [address] [place] [id number] [phone]           Please bring a list of your medicines (including vitamins, minerals and over-the-counter drugs). Also, tell your doctor about any allergies you may have. Wear comfortable clothes and leave your valuables at home.  If you re less than 20 weeks drink four 8-ounce glasses of fluid an hour before your exam. If you need to empty your bladder before your exam, try to release only a little urine. Then, drink another glass of fluid.  You may have up to two family members in the exam room. If you bring a small child, an adult must be there to care for him or her.  Please call the Imaging Department at your exam site with any questions.            May 09, 2018 10:00 AM CDT   ESTABLISHED PRENATAL with Dorothy Al[facility]re PA-C   [facility]    [address] [facility] [address]   [phone]            Jun 06, 2018 10:30 AM CDT   ESTABLISHED PRENATAL with Dorothy Alatorre PA-C   [facility]    [address] [place] [address]   [phone]            Jun 20, 2018 10:00 AM CDT   ESTABLISHED PRENATAL  with Dorothy [facility]    [address] [place] [address]   [phone]            Jul 05, 2018 10:00 AM CDT   ESTABLISHED PRENATAL with Dorothy Al[facility]re PA[facility]   [facility]    [address] [place] [address]   [phone]            Jul 18, 2018 10:00 AM CDT   ESTABLISHED PRENATAL with Dorothy Al[facility]re PA[facility]   [facility]    [address] [place] [address]   [phone]            Aug 01, 2018 10:00 AM CDT   ESTABLISHED PRENATAL with Dorothy Al[facility]re PA[facility]C   [facility]    [address] [place] [address]   [phone]            Aug 08, 2018 10:00 AM CDT   ESTABLISHED PRENATAL with Dorothy [facility]re PA[facility]   [facility]    [address] [place] [address]   [phone]            Aug 15, 2018 10:00 AM CDT   ESTABLISHED PRENATAL with Dorothy Alatorre PA[facility]C   [facility]    [address] [place] [address]   [phone]            Aug 22, 2018 10:00 AM CDT   ESTABLISHED PRENATAL with Dorothy [facility]re PA[facility]   [facility]    [address] [place] [address]   [phone]              Who to contact     If you have questions or need follow up information about today's clinic visit or your schedule please contact [facility] directly at [phone].  Normal or non-critical lab and imaging results will be communicated to you by MyChart, letter or phone within 4 business days after the clinic has received the results. If you do not hear from us within 7 days, please contact the clinic through MyChart or phone. If you have a critical or abnormal lab result, we will notify you by phone as  soon as possible.  Submit refill requests through [employer] or call your pharmacy and they will forward the refill request to us. Please allow 3 business days for your refill to be completed.          Additional Information About Your Visit        [employer] Information     [employer] gives you secure access to your electronic health record. If you see a primary care provider, you can also send messages to your care team and make appointments. If you have questions, please call your primary care clinic.  If you do not have a primary care provider, please call [phone] and they will assist you.        Care EveryWhere ID     This is your Care EveryWhere ID. This could be used by other organizations to access your [facility] medical records  [id number]        Your Vitals Were     Pulse Temperature Respirations Last Period BMI (Body Mass Index)       80 97.2  F (36.2  C) (Temporal) 18 11/23/2017 (Exact Date) 31.48 kg/m2        Blood Pressure from Last 3 Encounters:   04/11/18 110/70   03/31/18 111/65   03/08/18 116/72    Weight from Last 3 Encounters:   04/11/18 201 lb (91.2 kg)   03/08/18 194 lb (88 kg)   03/02/18 196 lb 4 oz (89 kg)              Today, you had the following     No orders found for display         Today's Medication Changes          These changes are accurate as of 4/11/18 10:44 AM.  If you have any questions, ask your nurse or doctor.               Start taking these medicines.        Dose/Directions    cephALEXin 500 MG capsule   Commonly known as:  KEFLEX   Used for:  Nasopharyngitis infective   Started by:  Dorothy Alatorre PA-C        Dose:  500 mg   Take 1 capsule (500 mg) by mouth 3 times daily for 10 days   Quantity:  30 capsule   Refills:  0            Where to get your medicines      These medications were sent to [facility] [place]  [place], [place] - [address]  [address], [place] [address]     Phone:  [phone]     cephALEXin 500 MG capsule                Primary  Care Provider Office Phone # Fax #    Dorothy Alatorre PA-C [phone] [address] [place] [address]        Equal Access to Services     DOUG VALLES : Hadcarter vianney sue sino Sochhayaali, waaxda luqadaha, qaybta kaalmada adesuhailda, anjana michaels laMattamelie dennis. So [facility] [phone].    ATENCIÓN: Si habla español, tiene a hines disposición servicios gratuitos de asistencia lingüística. Llame al [phone].    We comply with applicable federal civil rights laws and [place] laws. We do not discriminate on the basis of race, color, national origin, age, disability, sex, sexual orientation, or gender identity.            Thank you!     Thank you for choosing [facility]  for your care. Our goal is always to provide you with excellent care. Hearing back from our patients is one way we can continue to improve our services. Please take a few minutes to complete the written survey that you may receive in the mail after your visit with us. Thank you!             Your Updated Medication List - Protect others around you: Learn how to safely use, store and throw away your medicines at www.disposemymeds.org.          This list is accurate as of 4/11/18 10:44 AM.  Always use your most recent med list.                   Brand Name Dispense Instructions for use Diagnosis    cephALEXin 500 MG capsule    KEFLEX    30 capsule    Take 1 capsule (500 mg) by mouth 3 times daily for 10 days    Nasopharyngitis infective       prenatal multivitamin plus iron 27-0.8 MG Tabs per tablet     1 tablet    Take 1 tablet by mouth daily    Amenorrhea

## 2018-04-11 NOTE — NURSING NOTE
"Chief Complaint   Patient presents with     Prenatal Care       Initial /70  Pulse 80  Temp 97.2  F (36.2  C) (Temporal)  Resp 18  Wt 201 lb (91.2 kg)  LMP 11/23/2017 (Exact Date)  BMI 31.48 kg/m2 Estimated body mass index is 31.48 kg/(m^2) as calculated from the following:    Height as of 3/8/18: 5' 7\" (1.702 m).    Weight as of this encounter: 201 lb (91.2 kg).  BP completed using cuff size: manda Arevalo MA      "

## 2018-04-11 NOTE — PATIENT INSTRUCTIONS
Mucinex (plain one) - no Decongestant or Sudafed for daytime    Robitussin DM for night time cough

## 2018-04-18 ENCOUNTER — TELEPHONE (OUTPATIENT)
Dept: FAMILY MEDICINE | Facility: CLINIC | Age: 38
End: 2018-04-18

## 2018-04-18 ENCOUNTER — HOSPITAL ENCOUNTER (OUTPATIENT)
Dept: ULTRASOUND IMAGING | Facility: CLINIC | Age: 38
Discharge: HOME | End: 2018-04-18
Attending: PHYSICIAN ASSISTANT | Admitting: PHYSICIAN ASSISTANT
Payer: COMMERCIAL

## 2018-04-18 DIAGNOSIS — Z34.92 ENCOUNTER FOR SUPERVISION OF NORMAL PREGNANCY IN SECOND TRIMESTER: ICD-10-CM

## 2018-04-18 DIAGNOSIS — Z34.90 ENCOUNTER FOR PREGNANCY RELATED EXAMINATION, ANTEPARTUM: Primary | ICD-10-CM

## 2018-04-18 PROCEDURE — 76805 OB US >/= 14 WKS SNGL FETUS: CPT

## 2018-04-18 NOTE — TELEPHONE ENCOUNTER
Notes Recorded by Dorothy Alatorre PA-C on 4/18/2018 at 4:28 PM  Enid  Your baby's ultrasound looks good other than there was some difficulty and seen 1 of the outflow tracts in the heart.  All other anatomy looks good.  I would like to repeat an ultrasound in the next couple of weeks to make sure this is seen and is normal.  It also is noted that you are due date is still quite a bit off from what you previously thought it was.  In reviewing the ultrasound we did at 17 weeks and the one we just did, I am going to change your due date to 8/16/2018.  You have any questions let me know.  I will have my care  contact you to get a limited ultrasound ordered and done.    Dorothy Alatorre PA-C

## 2018-04-18 NOTE — PROGRESS NOTES
Enid  Your baby's ultrasound looks good other than there was some difficulty and seen 1 of the outflow tracts in the heart.  All other anatomy looks good.  I would like to repeat an ultrasound in the next couple of weeks to make sure this is seen and is normal.  It also is noted that you are due date is still quite a bit off from what you previously thought it was.  In reviewing the ultrasound we did at 17 weeks and the one we just did, I am going to change your due date to 8/16/2018.  You have any questions let me know.  I will have my care  contact you to get a limited ultrasound ordered and done.    Dorothy Alatorre PA-C

## 2018-04-25 ENCOUNTER — HOSPITAL ENCOUNTER (OUTPATIENT)
Dept: ULTRASOUND IMAGING | Facility: CLINIC | Age: 38
Discharge: HOME | End: 2018-04-25
Attending: PHYSICIAN ASSISTANT | Admitting: PHYSICIAN ASSISTANT
Payer: COMMERCIAL

## 2018-04-25 DIAGNOSIS — Z34.90 ENCOUNTER FOR PREGNANCY RELATED EXAMINATION, ANTEPARTUM: ICD-10-CM

## 2018-04-25 PROCEDURE — 76816 OB US FOLLOW-UP PER FETUS: CPT

## 2018-04-26 ENCOUNTER — TELEPHONE (OUTPATIENT)
Dept: FAMILY MEDICINE | Facility: CLINIC | Age: 38
End: 2018-04-26

## 2018-04-26 DIAGNOSIS — O44.00 COMPLETE PLACENTA PREVIA NOS OR WITHOUT HEMORRHAGE, UNSPECIFIED TRIMESTER: Primary | ICD-10-CM

## 2018-04-26 NOTE — TELEPHONE ENCOUNTER
Notes Recorded by Dorothy Alatorre PA-C on 4/26/2018 at 12:07 PM  Patient called with US report - I  Would like her to go to [facility] for US/consult surrounding complete previa

## 2018-04-26 NOTE — TELEPHONE ENCOUNTER
M referral placed. Patient informed. Number given for patient to schedule if she does not hear from them in a timely manner. No further action needed  Kelsey Mcclellan, [facility]

## 2018-04-26 NOTE — PROGRESS NOTES
Patient called with US report - I  Would like her to go to [facility] for US/consult surrounding complete previa

## 2018-05-03 ENCOUNTER — PRE VISIT (OUTPATIENT)
Dept: MATERNAL FETAL MEDICINE | Facility: CLINIC | Age: 38
End: 2018-05-03

## 2018-05-09 ENCOUNTER — PRENATAL OFFICE VISIT (OUTPATIENT)
Dept: FAMILY MEDICINE | Facility: CLINIC | Age: 38
End: 2018-05-09
Payer: COMMERCIAL

## 2018-05-09 VITALS
SYSTOLIC BLOOD PRESSURE: 100 MMHG | HEART RATE: 80 BPM | DIASTOLIC BLOOD PRESSURE: 62 MMHG | TEMPERATURE: 97.5 F | BODY MASS INDEX: 32.42 KG/M2 | OXYGEN SATURATION: 100 % | WEIGHT: 207 LBS | RESPIRATION RATE: 18 BRPM

## 2018-05-09 DIAGNOSIS — O44.42 LOW-LYING PLACENTA IN SECOND TRIMESTER: ICD-10-CM

## 2018-05-09 DIAGNOSIS — O09.529 SUPERVISION OF HIGH-RISK PREGNANCY OF ELDERLY MULTIGRAVIDA: Primary | ICD-10-CM

## 2018-05-09 DIAGNOSIS — O44.00 COMPLETE PLACENTA PREVIA NOS OR WITHOUT HEMORRHAGE, UNSPECIFIED TRIMESTER: ICD-10-CM

## 2018-05-09 PROCEDURE — 99207 ZZC COMPLICATED OB VISIT: CPT | Performed by: PHYSICIAN ASSISTANT

## 2018-05-09 ASSESSMENT — PAIN SCALES - GENERAL: PAINLEVEL: NO PAIN (0)

## 2018-05-09 NOTE — PROGRESS NOTES
Doing well.  No concerns today. Taking PNV.   Reviewed recent US-no concerns with anatomical survey. fluid levels, and fetal anatomy all returned normal.  Unfortunately it appears she has a complete placenta previa.  She will be seen M tomorrow for an ultrasound and consultation.  Will follow their guidelines and plan once established.  Discussed opportunity to meet with OB coordinator surrounding education inclusive of third trimester of pregnancy.  Also encouraged birthing & lactation classes  offered at [facility].  Reminded of upcoming labs including 1 hour GTT, antitreponema and hemoglobin. These labs are typically done between 24-28 weeks gestation.  Also discussed upcoming Tdap recommendation - would like to do this immunization between 27-34 weeks for baby's immune system to have protection against pertussis.  RTC in 4 weeks    Electronically signed by Dorothy Alatorre PA-C  5/9/2018

## 2018-05-09 NOTE — MR AVS SNAPSHOT
After Visit Summary   5/9/2018    Enid Cisneros    MRN: [id number]           Patient Information     Date Of Birth          1980        Visit Information        Provider Department      5/9/2018 10:00 AM Dorothy Alatorre PA-C [facility]        Today's Diagnoses     Supervision of high-risk pregnancy of elderly multigravida    -  1    Complete placenta previa nos or without hemorrhage, unspecified trimester        Low-lying placenta in second trimester           Follow-ups after your visit        Your next 10 appointments already scheduled     May 10, 2018  1:30 PM CDT   LINDSEY US COMP with [id number]   [facility] Maternal Fetal Medicine Ultrasound - [facility])    [address] [place] [id number] [phone]           Wear comfortable clothes and leave your valuables at home.            May 10, 2018  2:00 PM CDT   Radiology MD with  LINDSEY CHAO   [facility] Maternal Fetal Medicine [facility])    [address] [place] [id number] [phone]           Please arrive at the time given for your first appointment. This visit is used internally to schedule the physician's time during your ultrasound.            Jun 06, 2018 10:30 AM CDT   ESTABLISHED PRENATAL with Dorothy Alatorre PA-C   [facility]    [address] [place] [address]   [phone]            Jun 20, 2018 10:00 AM CDT   ESTABLISHED PRENATAL with Dorothy Alatorre PA-C   [facility]    [address] [place] [address]   [phone]            Jul 05, 2018 10:00 AM CDT   ESTABLISHED PRENATAL with Dorothy Alatorre PA-C   [facility]    [address] [place] [address]   [phone]            Jul 18, 2018 10:00 AM CDT   ESTABLISHED PRENATAL with  Dorothy Alatorre PA-C   [facility]    [address] [place] [address]   [phone]            Aug 01, 2018 10:00 AM CDT   ESTABLISHED PRENATAL with Dorothy Alatorre PA-C   [facility]    [address] [place] [address]   [phone]            Aug 08, 2018 10:00 AM CDT   ESTABLISHED PRENATAL with Dorothy Alatorre PA-C   [facility]    [address] [place] [address]   [phone]            Aug 15, 2018 10:00 AM CDT   ESTABLISHED PRENATAL with Dorothy Alatorre PA-C   [facility]    [address] [place] [address]   [phone]            Aug 22, 2018 10:00 AM CDT   ESTABLISHED PRENATAL with Dorothy Alatorre PA-C   [facility]    [address] [place] [address]   [phone]              Who to contact     If you have questions or need follow up information about today's clinic visit or your schedule please contact [facility] directly at [phone].  Normal or non-critical lab and imaging results will be communicated to you by [employer], letter or phone within 4 business days after the clinic has received the results. If you do not hear from us within 7 days, please contact the clinic through [employer] or phone. If you have a critical or abnormal lab result, we will notify you by phone as soon as possible.  Submit refill requests through [employer] or call your pharmacy and they will forward the refill request to us. Please allow 3 business days for your refill to be completed.          Additional Information About Your Visit        [employer] Information     [employer] gives you secure access to your electronic health record. If you see a primary care provider, you can also send messages to your care team and make  appointments. If you have questions, please call your primary care clinic.  If you do not have a primary care provider, please call [phone] and they will assist you.        Care EveryWhere ID     This is your Care EveryWhere ID. This could be used by other organizations to access your [facility] medical records  [id number]        Your Vitals Were     Pulse Temperature Respirations Last Period Pulse Oximetry BMI (Body Mass Index)    80 97.5  F (36.4  C) (Temporal) 18 11/23/2017 (Exact Date) 100% 32.42 kg/m2       Blood Pressure from Last 3 Encounters:   05/09/18 100/62   04/11/18 110/70   03/31/18 111/65    Weight from Last 3 Encounters:   05/09/18 207 lb (93.9 kg)   04/11/18 201 lb (91.2 kg)   03/08/18 194 lb (88 kg)              Today, you had the following     No orders found for display       Primary Care Provider Office Phone # Fax #    Dorothy Alatorre PA-C [phone] [address] [place] [address]        Equal Access to Services     [facility] Center[facility] : Hadii aad ku hadasho Soomaali, waaxda luqadaha, qaybta kaalmada ademarbellayamanisha, anjana salazar . So [facility] [phone].    ATENCIÓN: Si habla español, tiene a hines disposición servicios gratuitos de asistencia lingüística. [facility] [phone].    We comply with applicable federal civil rights laws and [place] laws. We do not discriminate on the basis of race, color, national origin, age, disability, sex, sexual orientation, or gender identity.            Thank you!     Thank you for choosing [facility]  for your care. Our goal is always to provide you with excellent care. Hearing back from our patients is one way we can continue to improve our services. Please take a few minutes to complete the written survey that you may receive in the mail after your visit with us. Thank you!             Your Updated Medication List - Protect others around you: Learn how to safely use, store and throw away your  medicines at www.disposemymeds.org.          This list is accurate as of 5/9/18  4:21 PM.  Always use your most recent med list.                   Brand Name Dispense Instructions for use Diagnosis    prenatal multivitamin plus iron 27-0.8 MG Tabs per tablet     1 tablet    Take 1 tablet by mouth daily    Amenorrhea

## 2018-05-09 NOTE — NURSING NOTE
"Chief Complaint   Patient presents with     Prenatal Care       Initial /62  Pulse 80  Temp 97.5  F (36.4  C) (Temporal)  Resp 18  Wt 207 lb (93.9 kg)  LMP 11/23/2017 (Exact Date)  SpO2 100%  BMI 32.42 kg/m2 Estimated body mass index is 32.42 kg/(m^2) as calculated from the following:    Height as of 3/8/18: 5' 7\" (1.702 m).    Weight as of this encounter: 207 lb (93.9 kg).  BP completed using cuff size: erick Arevalo MA      "

## 2018-05-10 ENCOUNTER — OFFICE VISIT (OUTPATIENT)
Dept: MATERNAL FETAL MEDICINE | Facility: CLINIC | Age: 38
End: 2018-05-10
Attending: PHYSICIAN ASSISTANT
Payer: COMMERCIAL

## 2018-05-10 ENCOUNTER — HOSPITAL ENCOUNTER (OUTPATIENT)
Dept: ULTRASOUND IMAGING | Facility: CLINIC | Age: 38
Discharge: HOME | End: 2018-05-10
Attending: PHYSICIAN ASSISTANT | Admitting: PHYSICIAN ASSISTANT
Payer: COMMERCIAL

## 2018-05-10 DIAGNOSIS — O44.03 PLACENTA PREVIA ANTEPARTUM IN THIRD TRIMESTER: Primary | ICD-10-CM

## 2018-05-10 DIAGNOSIS — O26.90 PREGNANCY RELATED CONDITION, ANTEPARTUM: ICD-10-CM

## 2018-05-10 PROCEDURE — 76811 OB US DETAILED SNGL FETUS: CPT

## 2018-05-10 NOTE — PROGRESS NOTES
Please refer to ultrasound report under 'Imaging' Studies of 'Chart Review' tabs.    Cricket Bryant M.D.

## 2018-05-10 NOTE — MR AVS SNAPSHOT
MRN:[id number]                      After Visit Summary   5/10/2018    Enid Cisneros    MRN: [id number]           Visit Information        Provider Department      5/10/2018 2:00 PM Cricket Bryant MD [facility] Maternal Fetal Medicine [facility]        Your next 10 appointments already scheduled     Jun 06, 2018 10:30 AM CDT   ESTABLISHED PRENATAL with Dorothy Alatorre PA-C   [facility]    [address] [place] [address]   [phone]            Jun 20, 2018 10:00 AM CDT   ESTABLISHED PRENATAL with Dorothy S Celi, PA-C   [facility]    [address] [place] [address]   [phone]            Jul 05, 2018 10:00 AM CDT   ESTABLISHED PRENATAL with Dorothy S Celi, PA-C   [facility]    [address] [place] [address]   [phone]            Jul 18, 2018 10:00 AM CDT   ESTABLISHED PRENATAL with Dorothy S Celi, PA-C   [facility]    [address] [place] [address]   [phone]            Aug 01, 2018 10:00 AM CDT   ESTABLISHED PRENATAL with Dorothy S Celi, PA-C   [facility]    [address] [place] [address]   [phone]            Aug 08, 2018 10:00 AM CDT   ESTABLISHED PRENATAL with Dorothy S Celi, PA-C   [facility]    [address] [place] [address]   [phone]            Aug 15, 2018 10:00 AM CDT   ESTABLISHED PRENATAL with Dorothy S Celi, PA-C   [facility]    [address] [place] [address]   [phone]            Aug 22, 2018 10:00 AM CDT   ESTABLISHED PRENATAL with Dorothy S Celi, PA-C   [facility]    [address] [place] [address]  [facility] [phone]            Aug 29, 2018 10:00 AM CDT   ESTABLISHED PRENATAL with Dorothy Alatorre PA-C   [facility]    [address] [facility] [phone] [phone]              MyChart Information     [employer] gives you secure access to your electronic health record. If you see a primary care provider, you can also send messages to your care team and make appointments. If you have questions, please call your primary care clinic.  If you do not have a primary care provider, please call [phone] and they will assist you.        Care EveryWhere ID     This is your Care EveryWhere ID. This could be used by other organizations to access your [facility] medical records  [id number]        Equal Access to Services     DOUG VALLES : Bala Russo, lucinda hathaway, jeniffer casanova, anjana dennis. So [facility] [phone].    ATENCIÓN: Si habla español, tiene a hines disposición servicios gratuitos de asistencia lingüística. Llame al [phone].    We comply with applicable federal civil rights laws and [place] laws. We do not discriminate on the basis of race, color, national origin, age, disability, sex, sexual orientation, or gender identity.

## 2018-05-14 PROBLEM — O44.42 LOW-LYING PLACENTA IN SECOND TRIMESTER: Status: RESOLVED | Noted: 2018-03-12 | Resolved: 2018-05-14

## 2018-06-06 ENCOUNTER — TELEPHONE (OUTPATIENT)
Dept: FAMILY MEDICINE | Facility: CLINIC | Age: 38
End: 2018-06-06

## 2018-06-06 ENCOUNTER — PRENATAL OFFICE VISIT (OUTPATIENT)
Dept: FAMILY MEDICINE | Facility: CLINIC | Age: 38
End: 2018-06-06
Payer: COMMERCIAL

## 2018-06-06 VITALS
BODY MASS INDEX: 33.67 KG/M2 | HEART RATE: 84 BPM | SYSTOLIC BLOOD PRESSURE: 116 MMHG | OXYGEN SATURATION: 98 % | TEMPERATURE: 96.8 F | RESPIRATION RATE: 18 BRPM | DIASTOLIC BLOOD PRESSURE: 72 MMHG | WEIGHT: 215 LBS

## 2018-06-06 DIAGNOSIS — O44.00 COMPLETE PLACENTA PREVIA NOS OR WITHOUT HEMORRHAGE, UNSPECIFIED TRIMESTER: Primary | ICD-10-CM

## 2018-06-06 DIAGNOSIS — O09.529 SUPERVISION OF HIGH-RISK PREGNANCY OF ELDERLY MULTIGRAVIDA: ICD-10-CM

## 2018-06-06 DIAGNOSIS — O44.00 COMPLETE PLACENTA PREVIA NOS OR WITHOUT HEMORRHAGE, UNSPECIFIED TRIMESTER: ICD-10-CM

## 2018-06-06 LAB
GLUCOSE 1H P 50 G GLC PO SERPL-MCNC: 96 MG/DL (ref 60–129)
HGB BLD-MCNC: 11.9 G/DL (ref 11.7–15.7)

## 2018-06-06 PROCEDURE — 00000218 ZZHCL STATISTIC OBHBG - HEMOGLOBIN: Performed by: PHYSICIAN ASSISTANT

## 2018-06-06 PROCEDURE — 36415 COLL VENOUS BLD VENIPUNCTURE: CPT | Performed by: PHYSICIAN ASSISTANT

## 2018-06-06 PROCEDURE — 99207 ZZC COMPLICATED OB VISIT: CPT | Performed by: PHYSICIAN ASSISTANT

## 2018-06-06 PROCEDURE — 86780 TREPONEMA PALLIDUM: CPT | Performed by: PHYSICIAN ASSISTANT

## 2018-06-06 PROCEDURE — 82950 GLUCOSE TEST: CPT | Performed by: PHYSICIAN ASSISTANT

## 2018-06-06 ASSESSMENT — PAIN SCALES - GENERAL: PAINLEVEL: NO PAIN (0)

## 2018-06-06 NOTE — NURSING NOTE
"Chief Complaint   Patient presents with     Prenatal Care       Initial /72  Pulse 84  Temp 96.8  F (36  C) (Temporal)  Resp 18  Wt 215 lb (97.5 kg)  LMP 11/23/2017 (Exact Date)  SpO2 98%  BMI 33.67 kg/m2 Estimated body mass index is 33.67 kg/(m^2) as calculated from the following:    Height as of 3/8/18: 5' 7\" (1.702 m).    Weight as of this encounter: 215 lb (97.5 kg).  BP completed using cuff size: manda Arevalo MA      "

## 2018-06-06 NOTE — PROGRESS NOTES
Doing well.  No concerns today. Taking PNV.   He needs a repeat ultrasound with maternal-fetal medicine in 2 weeks to recheck placental placement.  Will help get this set up for her.  She opted to decline the Tdap today although I reviewed the importance of it with her today in detail.  She would like to consider this for next visit.  OB labs including 1hr GTT, Anti Treponema and Hemoglobin will be done today.  Prenatal flowsheet information is reviewed.  Discussed kick counts and fetal movement.  Discussed opportunity to meet with OB coordinator surrounding education inclusive of third trimester of pregnancy.  Also encouraged birthing & lactation classes  offered at [facility].  I will contact her with lab results through [employer] if active or by phone call.      RTC in 2 weeks    Electronically signed by Dorothy Alatorre PA-C  6/6/2018

## 2018-06-06 NOTE — MR AVS SNAPSHOT
After Visit Summary   6/6/2018    Enid Cisneros    MRN: [id number]           Patient Information     Date Of Birth          1980        Visit Information        Provider Department      6/6/2018 10:30 AM Dorothy Alatorre PA-C [facility]        Today's Diagnoses     Complete placenta previa nos or without hemorrhage, unspecified trimester        Supervision of high-risk pregnancy of elderly multigravida           Follow-ups after your visit        Your next 10 appointments already scheduled     Jun 20, 2018 10:00 AM CDT   ESTABLISHED PRENATAL with Dorothy Alatorre PA-C   [facility] ([facility]    [address] [place] [address]   [phone]            Jul 05, 2018 10:00 AM CDT   ESTABLISHED PRENATAL with Dorothy Alatorre PA-C   [facility] ([facility])    [address] [place] [address]   [phone]            Jul 18, 2018 10:00 AM CDT   ESTABLISHED PRENATAL with Dorothy Alatorre PA-C   [facility] ([facility])    [address] [place] [address]   [phone]            Aug 01, 2018 10:00 AM CDT   ESTABLISHED PRENATAL with Dorothy Alatorre PA-C   [facility] ([facility])    [address] [place] [address]   [phone]            Aug 08, 2018 10:00 AM CDT   ESTABLISHED PRENATAL with Dorothy Alatorre PA-C   [facility] ([facility])    [address] [place] [address]   [phone]            Aug 15, 2018 10:00 AM CDT   ESTABLISHED PRENATAL with Dorothy Alatorre PA-C   [facility] ([facility])    [address] [place] [address]   [phone]            Aug 22, 2018 10:00 AM CDT   ESTABLISHED PRENATAL with Dorothy Alatorre PA-C   [facility] ([facility])    [facility]  [facility] [id number] [phone]            Aug 29, 2018 10:00 AM CDT   ESTABLISHED PRENATAL with Dorothy Alatorre PA-C   [facility] ([facility])    [address] [facility] [phone] [phone]              Who to contact     If you have questions or need follow up information about today's clinic visit or your schedule please contact [facility] directly at [phone].  Normal or non-critical lab and imaging results will be communicated to you by SelectMindshart, letter or phone within 4 business days after the clinic has received the results. If you do not hear from us within 7 days, please contact the clinic through UserAppt or phone. If you have a critical or abnormal lab result, we will notify you by phone as soon as possible.  Submit refill requests through [employer] or call your pharmacy and they will forward the refill request to us. Please allow 3 business days for your refill to be completed.          Additional Information About Your Visit        SelectMindsharIdentiv Information     [employer] gives you secure access to your electronic health record. If you see a primary care provider, you can also send messages to your care team and make appointments. If you have questions, please call your primary care clinic.  If you do not have a primary care provider, please call [phone] and they will assist you.        Care EveryWhere ID     This is your Care EveryWhere ID. This could be used by other organizations to access your [place] medical records  [id number]        Your Vitals Were     Pulse Temperature Respirations Last Period Pulse Oximetry BMI (Body Mass Index)    84 96.8  F (36  C) (Temporal) 18 11/23/2017 (Exact Date) 98% 33.67 kg/m2       Blood Pressure from Last 3 Encounters:   06/06/18 116/72   05/09/18 100/62   04/11/18 110/70    Weight from Last 3 Encounters:   06/06/18 215 lb (97.5 kg)   05/09/18 207 lb (93.9 kg)   04/11/18 201  lb (91.2 kg)              We Performed the Following     Anti Treponema     Glucose tolerance, gest screen, 1 hour     OB hemoglobin        Primary Care Provider Office Phone # Fax #    Dorothy Alatorre PA-C [phone] [address] [place] [address]        Equal Access to Services     [facility]: Hadii aad ku hadasho Soomaali, waaxda luqadaha, qaybta kaalmada adeegyada, waxay idiin hayaan adeeg kharash la'aan ah. So [facility] [phone].    ATENCIÓN: Si habla español, tiene a hines disposición servicios gratuitos de asistencia lingüística. Llame al [phone].    We comply with applicable federal civil rights laws and [place] laws. We do not discriminate on the basis of race, color, national origin, age, disability, sex, sexual orientation, or gender identity.            Thank you!     Thank you for choosing [facility]  for your care. Our goal is always to provide you with excellent care. Hearing back from our patients is one way we can continue to improve our services. Please take a few minutes to complete the written survey that you may receive in the mail after your visit with us. Thank you!             Your Updated Medication List - Protect others around you: Learn how to safely use, store and throw away your medicines at www.disposemymeds.org.          This list is accurate as of 6/6/18 11:09 AM.  Always use your most recent med list.                   Brand Name Dispense Instructions for use Diagnosis    prenatal multivitamin plus iron 27-0.8 MG Tabs per tablet     1 tablet    Take 1 tablet by mouth daily    Amenorrhea

## 2018-06-06 NOTE — Clinical Note
Per MFM -needs repeat ultrasound done at the [facility] for recheck of placenta placement which is in her problem list.  This needs to be done at 32 weeks which is 2 weeks from now.  Please help coordinate.

## 2018-06-07 LAB — T PALLIDUM AB SER QL: NONREACTIVE

## 2018-06-18 ENCOUNTER — HOSPITAL ENCOUNTER (OUTPATIENT)
Dept: ULTRASOUND IMAGING | Facility: CLINIC | Age: 38
Discharge: HOME | End: 2018-06-18
Attending: PHYSICIAN ASSISTANT | Admitting: PHYSICIAN ASSISTANT
Payer: COMMERCIAL

## 2018-06-18 ENCOUNTER — OFFICE VISIT (OUTPATIENT)
Dept: MATERNAL FETAL MEDICINE | Facility: CLINIC | Age: 38
End: 2018-06-18
Attending: PHYSICIAN ASSISTANT
Payer: COMMERCIAL

## 2018-06-18 DIAGNOSIS — O44.03 PLACENTA PREVIA ANTEPARTUM IN THIRD TRIMESTER: Primary | ICD-10-CM

## 2018-06-18 DIAGNOSIS — O26.90 PREGNANCY RELATED CONDITION, ANTEPARTUM: ICD-10-CM

## 2018-06-18 PROCEDURE — 76817 TRANSVAGINAL US OBSTETRIC: CPT

## 2018-06-18 PROCEDURE — 76816 OB US FOLLOW-UP PER FETUS: CPT

## 2018-06-18 NOTE — MR AVS SNAPSHOT
MRN:[id number]                      After Visit Summary   6/18/2018    Enid Cisneros    MRN: [id number]           Visit Information        Provider Department      6/18/2018 10:45 AM Cricket Bryant MD [facility] Maternal Fetal Medicine [facility]        Your next 10 appointments already scheduled     Jun 20, 2018 10:00 AM CDT   ESTABLISHED PRENATAL with Dorothy Alatorre PA-C   [facility]    [address] [place] [address]   [phone]            Jul 05, 2018 10:00 AM CDT   ESTABLISHED PRENATAL with Dorothy S Celi, PA-C   [facility]    [address] [place] [address]   [phone]            Jul 18, 2018 10:00 AM CDT   ESTABLISHED PRENATAL with Dorothy S eCli, PA-C   [facility]    [address] [place] [address]   [phone]            Aug 01, 2018 10:00 AM CDT   ESTABLISHED PRENATAL with Dorothy S Celi, PA-C   [facility]    [address] [place] [address]   [phone]            Aug 08, 2018 10:00 AM CDT   ESTABLISHED PRENATAL with Dorothy S Celi, PA-C   [facility]    [address] [place] [address]   [phone]            Aug 15, 2018 10:00 AM CDT   ESTABLISHED PRENATAL with Dorothyher CARLOS Aaltorre, PA-C   [facility]    [address] [place] [address]   [phone]            Aug 22, 2018 10:00 AM CDT   ESTABLISHED PRENATAL with Dorothy S Celi, PA-C   [facility]    [address] [place] [address]   [phone]            Aug 29, 2018 10:00 AM CDT   ESTABLISHED PRENATAL with Dorothy S Celi, PA-C   [facility]    [address] [place] [address]  Drive  [facility] [address] [phone]              [employer] Information     [employer] gives you secure access to your electronic health record. If you see a primary care provider, you can also send messages to your care team and make appointments. If you have questions, please call your primary care clinic.  If you do not have a primary care provider, please call [phone] and they will assist you.        Care EveryWhere ID     This is your Care EveryWhere ID. This could be used by other organizations to access your [place] medical records  [id number]        Equal Access to Services     DOUG VALLES : Bala Russo, lucinda hathaway, anjana macias. So [facility] [phone].    ATENCIÓN: Si habla español, tiene a hines disposición servicios gratuitos de asistencia lingüística. Jenna al [phone].    We comply with applicable federal civil rights laws and [place] laws. We do not discriminate on the basis of race, color, national origin, age, disability, sex, sexual orientation, or gender identity.

## 2018-06-18 NOTE — PROGRESS NOTES
You have delivered her last 2 babies with me.  She is preferring you and I do the section - I will get her in with you for csection consultation ASAP.    Please coordinate visit with AF in place of [facility] for csection consultation one of next visits.

## 2018-06-19 ENCOUNTER — TELEPHONE (OUTPATIENT)
Dept: OBGYN | Facility: CLINIC | Age: 38
End: 2018-06-19

## 2018-06-19 ENCOUNTER — HOSPITAL ENCOUNTER (INPATIENT)
Facility: CLINIC | Age: 38
LOS: 1 days | Discharge: HOME | End: 2018-06-20
Attending: FAMILY MEDICINE | Admitting: FAMILY MEDICINE
Payer: COMMERCIAL

## 2018-06-19 DIAGNOSIS — O44.00 COMPLETE PLACENTA PREVIA NOS OR WITHOUT HEMORRHAGE, UNSPECIFIED TRIMESTER: Primary | ICD-10-CM

## 2018-06-19 PROBLEM — N93.9 VAGINAL BLEEDING: Status: ACTIVE | Noted: 2018-06-19

## 2018-06-19 LAB
ABO + RH BLD: NORMAL
ABO + RH BLD: NORMAL
ALBUMIN UR-MCNC: NEGATIVE MG/DL
APPEARANCE UR: CLEAR
BACTERIA #/AREA URNS HPF: ABNORMAL /HPF
BASOPHILS # BLD AUTO: 0 10E9/L (ref 0–0.2)
BASOPHILS NFR BLD AUTO: 0.3 %
BILIRUB UR QL STRIP: NEGATIVE
BLD GP AB SCN SERPL QL: NORMAL
BLOOD BANK CMNT PATIENT-IMP: NORMAL
COLOR UR AUTO: YELLOW
DIFFERENTIAL METHOD BLD: ABNORMAL
EOSINOPHIL NFR BLD AUTO: 2.8 %
ERYTHROCYTE [DISTWIDTH] IN BLOOD BY AUTOMATED COUNT: 12.6 % (ref 10–15)
GLUCOSE UR STRIP-MCNC: NEGATIVE MG/DL
HCT VFR BLD AUTO: 34.2 % (ref 35–47)
HGB BLD-MCNC: 11.4 G/DL (ref 11.7–15.7)
HGB F BLD QL KLEIH BETKE: NORMAL
HGB UR QL STRIP: ABNORMAL
IMM GRANULOCYTES # BLD: 0.2 10E9/L (ref 0–0.4)
IMM GRANULOCYTES NFR BLD: 1.7 %
KETONES UR STRIP-MCNC: NEGATIVE MG/DL
LEUKOCYTE ESTERASE UR QL STRIP: NEGATIVE
LYMPHOCYTES # BLD AUTO: 1.8 10E9/L (ref 0.8–5.3)
LYMPHOCYTES NFR BLD AUTO: 16.5 %
MCH RBC QN AUTO: 30.1 PG (ref 26.5–33)
MCHC RBC AUTO-ENTMCNC: 33.3 G/DL (ref 31.5–36.5)
MCV RBC AUTO: 90 FL (ref 78–100)
MONOCYTES # BLD AUTO: 0.6 10E9/L (ref 0–1.3)
MONOCYTES NFR BLD AUTO: 5.4 %
MUCOUS THREADS #/AREA URNS LPF: PRESENT /LPF
NEUTROPHILS # BLD AUTO: 8.1 10E9/L (ref 1.6–8.3)
NEUTROPHILS NFR BLD AUTO: 73.3 %
NITRATE UR QL: NEGATIVE
NRBC # BLD AUTO: 0 10*3/UL
NRBC BLD AUTO-RTO: 0 /100
PH UR STRIP: 7 PH (ref 5–7)
PLATELET # BLD AUTO: 199 10E9/L (ref 150–450)
RBC # BLD AUTO: 3.79 10E12/L (ref 3.8–5.2)
RBC #/AREA URNS AUTO: <1 /HPF (ref 0–2)
SOURCE: ABNORMAL
SP GR UR STRIP: 1.01 (ref 1–1.03)
SPECIMEN EXP DATE BLD: NORMAL
SQUAMOUS #/AREA URNS AUTO: 5 /HPF (ref 0–1)
UROBILINOGEN UR STRIP-MCNC: 0 MG/DL (ref 0–2)
WBC # BLD AUTO: 11 10E9/L (ref 4–11)
WBC #/AREA URNS AUTO: 3 /HPF (ref 0–5)

## 2018-06-19 PROCEDURE — 86901 BLOOD TYPING SEROLOGIC RH(D): CPT | Performed by: FAMILY MEDICINE

## 2018-06-19 PROCEDURE — 59025 FETAL NON-STRESS TEST: CPT | Mod: 26 | Performed by: FAMILY MEDICINE

## 2018-06-19 PROCEDURE — 86850 RBC ANTIBODY SCREEN: CPT | Performed by: FAMILY MEDICINE

## 2018-06-19 PROCEDURE — 85025 COMPLETE CBC W/AUTO DIFF WBC: CPT | Performed by: FAMILY MEDICINE

## 2018-06-19 PROCEDURE — 12000031 ZZH R&B OB CRITICAL

## 2018-06-19 PROCEDURE — 85460 HEMOGLOBIN FETAL: CPT | Performed by: FAMILY MEDICINE

## 2018-06-19 PROCEDURE — 86900 BLOOD TYPING SEROLOGIC ABO: CPT | Performed by: FAMILY MEDICINE

## 2018-06-19 PROCEDURE — 81001 URINALYSIS AUTO W/SCOPE: CPT | Performed by: FAMILY MEDICINE

## 2018-06-19 PROCEDURE — 59025 FETAL NON-STRESS TEST: CPT

## 2018-06-19 PROCEDURE — G0463 HOSPITAL OUTPT CLINIC VISIT: HCPCS | Mod: 25

## 2018-06-19 RX ORDER — LIDOCAINE 40 MG/G
CREAM TOPICAL
Status: DISCONTINUED | OUTPATIENT
Start: 2018-06-19 | End: 2018-06-20 | Stop reason: HOSPADM

## 2018-06-19 RX ORDER — ONDANSETRON 2 MG/ML
4 INJECTION INTRAMUSCULAR; INTRAVENOUS EVERY 6 HOURS PRN
Status: DISCONTINUED | OUTPATIENT
Start: 2018-06-19 | End: 2018-06-19

## 2018-06-19 RX ORDER — BETAMETHASONE SODIUM PHOSPHATE AND BETAMETHASONE ACETATE 3; 3 MG/ML; MG/ML
12 INJECTION, SUSPENSION INTRA-ARTICULAR; INTRALESIONAL; INTRAMUSCULAR; SOFT TISSUE EVERY 24 HOURS
Status: DISCONTINUED | OUTPATIENT
Start: 2018-06-19 | End: 2018-06-20 | Stop reason: HOSPADM

## 2018-06-19 RX ORDER — ONDANSETRON 2 MG/ML
4 INJECTION INTRAMUSCULAR; INTRAVENOUS EVERY 6 HOURS PRN
Status: DISCONTINUED | OUTPATIENT
Start: 2018-06-19 | End: 2018-06-20 | Stop reason: HOSPADM

## 2018-06-19 NOTE — IP AVS SNAPSHOT
MRN:[id number]                      After Visit Summary   6/19/2018    Enid Cisneros    MRN: [id number]           Thank you!     Thank you for choosing [facility] for your care. Our goal is always to provide you with excellent care. Hearing back from our patients is one way we can continue to improve our services. Please take a few minutes to complete the written survey that you may receive in the mail after you visit with us. Thank you!        Patient Information     Date Of Birth          1980        Designated Caregiver       Most Recent Value    Caregiver    Will someone help with your care after discharge? yes    Name of designated caregiver Didier      About your hospital stay     You were admitted on:  June 19, 2018 You last received care in the:  [facility]    You were discharged on:  June 20, 2018       Who to Call     For medical emergencies, please call 911.  For non-urgent questions about your medical care, please call your primary care provider or clinic, [phone]          Attending Provider     Provider Specialty    Dex Charles MD Family Practice       Primary Care Provider Office Phone # Fax #    Dorothy Alatorre PA-C [phone]      Your next 10 appointments already scheduled     Jun 27, 2018 11:45 AM CDT   ESTABLISHED PRENATAL with Dorothy Alatorre PA-C   [facility] ([address] [address] [phone]            Jul 03, 2018  1:00 PM CDT   ESTABLISHED PRENATAL with Andres De León MD   [facility] ([facility]    [address] [place] [address] [phone]            Jul 18, 2018 10:00 AM CDT   ESTABLISHED PRENATAL with Dorothy Alatorre PA-C   [facility] ([facility])    [address] [place] [address] [phone]            Jul 26, 2018 10:00 AM CDT    ESTABLISHED PRENATAL with Dorothy Alatorre PA-C   [facility]    [address] [place] [address]   [phone]            Aug 02, 2018 11:30 AM CDT   ESTABLISHED PRENATAL with Dorothy Alatorre PA-C   [facility]    [address] [place] [address]   [phone]            Aug 09, 2018 10:00 AM CDT   ESTABLISHED PRENATAL with Dorothy Alatorre PA-C   [facility]    [address] [place] [address]   [phone]              Further instructions from your care team                OB Triage Discharge Instructions    Diet:  Regular diet as tolerated    Activity:  As tolerated, rest and no heavy lifting     Other Special Instructions: Return to the Birthing Unit at 1100 tomorrow for her second dose of Betamethasone.     Reason for being seen in L&D: vaginal bleeding    Treatment/procedures performed in L&D: monitoring, NST's bedrest     Call the Birthplace at [phone] if you have:  ? 5 or more contractions in one hour  ? Leaking of fluid from your vaginal area  ? Decreased fetal movement (follow kick count instructions)  ? Bleeding from your vaginal area  ? Swelling in your face, or increased swelling in your hands or legs  ? Headaches or vision problems such as blurring or seeing spots or starts  ? Nausea or vomiting lasting for more than 24 hours  ? An increase in weight (5lbs/week)  ? Epigastric pain (sometimes confused with heartburn that does not go away or a very bad stomach ache)    If you have any questions, please follow up with your doctor.        Patient Signature: ______________________________________________________________  By signing the above I acknowledge that a nurse or my care provider has explained the instruction to me and I have had any questions regarding my care explained to me.        Discharge Nurse Signature:  _______________________________ 6/20/2018  1:08 PM    Method of discharge: ambulation    Accompanied by: self  Time of discharge: 1315        Pending Results     No orders found for last 3 day(s).            Admission Information     Date & Time Provider Department Dept. Phone    6/19/2018 Dex Charles MD [facility] [phone]      Your Vitals Were     Blood Pressure Pulse Temperature Respirations Last Period       115/58 69 98  F (36.7  C) (Oral) 14 11/23/2017 (Exact Date)       MyChart Information     [employer] gives you secure access to your electronic health record. If you see a primary care provider, you can also send messages to your care team and make appointments. If you have questions, please call your primary care clinic.  If you do not have a primary care provider, please call [phone] and they will assist you.        Care EveryWhere ID     This is your Care EveryWhere ID. This could be used by other organizations to access your [place] medical records  [id number]        Equal Access to Services     DOUG VALLES : Hadcarter Russo, lucinda hathaway, anjana macias. So [facility] [phone].    ATENCIÓN: Si habla español, tiene a hines disposición servicios gratuitos de asistencia lingüística. Llame al [phone].    We comply with applicable federal civil rights laws and [place] laws. We do not discriminate on the basis of race, color, national origin, age, disability, sex, sexual orientation, or gender identity.               Review of your medicines      UNREVIEWED medicines. Ask your doctor about these medicines        Dose / Directions    prenatal multivitamin plus iron 27-0.8 MG Tabs per tablet   Used for:  Amenorrhea        Dose:  1 tablet   Take 1 tablet by mouth daily   Quantity:  1 tablet   Refills:  0                Protect others around you: Learn how to safely use, store and throw away your  medicines at www.disposemymeds.org.             Medication List: This is a list of all your medications and when to take them. Check marks below indicate your daily home schedule. Keep this list as a reference.      Medications           Morning Afternoon Evening Bedtime As Needed    prenatal multivitamin plus iron 27-0.8 MG Tabs per tablet   Take 1 tablet by mouth daily

## 2018-06-19 NOTE — TELEPHONE ENCOUNTER
Patient calls with concern about waking this morning and noticing vaginal bleeding. Stating there was a mucous like clot, red in color as well as some bleeding. She reports having had a transvaginal ultrasound yesterday to assess placental placement due to previously noted complete previa. Denies contractions and reports +FM.     Recommendation: Come to hospital for assessment

## 2018-06-19 NOTE — TELEPHONE ENCOUNTER
I contacted this patient and spoke with her regarding her MFM consultation from yesterday.  Dr. De León also reviewed the ultrasound.  Given the fact that she has a complete placenta previa, she is not eligible to deliver here via .    Patient is requesting [place] as her delivery hospital.  She will need to be set up with OB/GYN in [place] for a  delivery consultation.  I reviewed with her that they may allow her to continue coming here for her OB visits up until the , but if they request to require that she start seeing them on a consistent basis this is understandable.    She had a very light bleed this morning.  Has been on pelvic rest now for several months once placenta previa was noted.  Is avoiding intercourse.  Bleeding has stopped, no cramping.  Good fetal movement.  Obviously if she has any spotting or bleeding she will go to our OB department immediately.  She had no further questions.    Please set up  delivery consultation in [place] for the near future.    Electronically signed by Dorothy Alatorre PA-C  2018

## 2018-06-19 NOTE — TELEPHONE ENCOUNTER
Patient called back stating that she went to the bathroom and she is no longer noticing any bleeding and plans to stay home and monitor for any continued bleeding.     Recommendation:  If any further bleeding is noted, she should come in to hospital for assessment immediately. Patient verbalizes understanding and will call and come in if she notices any more bleeding.

## 2018-06-19 NOTE — PROGRESS NOTES
S: Triage Arrival  B: Enid is a 38 y.o.  @ 31w 5d who presents with complaint of having some bleeding noted this am & now at 1230, pt has history of complete previa  A: EFM applied. FHT's145 with moderate variability, accels present, no decels noted on strip. Contractions not seen on monitor or felt per pt.  Pt is experiencing some lower pelvic cramping since this am  R: Will notify MD to obtain further orders.

## 2018-06-19 NOTE — IP AVS SNAPSHOT
[facility]    [address] [facility]     [place] [address]    Phone:  [phone]                                       After Visit Summary   6/19/2018    Enid Cisneros    MRN: [id number]           After Visit Summary Signature Page     I have received my discharge instructions, and my questions have been answered. I have discussed any challenges I see with this plan with the nurse or doctor.    ..........................................................................................................................................  Patient/Patient Representative Signature      ..........................................................................................................................................  Patient Representative Print Name and Relationship to Patient    ..................................................               ................................................  Date                                            Time    ..........................................................................................................................................  Reviewed by Signature/Title    ...................................................              ..............................................  Date                                                            Time

## 2018-06-19 NOTE — PROGRESS NOTES
Reassuring FHTs, no bleeding or ctxs noted at this time, still experiencing some mild cramping in the lower right side of abdomen.  Will continue to monitor & update around 1545 (3hours from arrival time).

## 2018-06-19 NOTE — PROGRESS NOTES
S: Dr GREY [id number] B:  31 5/7 weeks with marginal placenta previa A; Dr Charles here discussed plan of admission for 24 hours due to recent vaginal bleeding despite having no active bleeding now. FHTS reassuring with baseline of 130's with accels, no decels. No contractions or pain. R: Plan admission, IV SL, close fetal monitoring and monitoring of vag bleeding. Betamethasone also in the plans..

## 2018-06-19 NOTE — PROGRESS NOTES
Dr. Charles was here & assessed pt.  No current bleeding noted, no ctxs seen or felt on monitor, cervix was visualized with a speculum per MD, cervix was noted to be closed with slight dark blood noted on the speculum.  Fetal Hgb stain was ordered & will monitor for the next hour.  Pt &  have no questions regarding plan of care.

## 2018-06-19 NOTE — H&P
[facility] Labor and Delivery History and Physical    Enid Cisneros MRN# [id number]   Age: 38 year old YOB: 1980     Date of Admission:  2018    Primary care provider: Dorothy Alatorre           Chief Complaint:   Enid Cisneros is a 38 year old female who is 32w6d pregnant and being admitted for vaginal bleeding. Patient comes in complaining of vaginal bleeding.  She is a  at 31 weeks and 5 days.  She woke this morning to have a quarter sized clot come from the vagina during urinating.  She called her primary OB who advised her to watch her symptoms.  Then when she had a second occasion of a similar size clot she presented to labor and delivery.  She has been here for about an hour and there is been no bleeding.  She denies any active contractions.  She has a history of placenta previa.  She plans on delivering by  at 37 weeks at [facility].  Currently she feels well.  My sterile speculum exam shows a closed cervix.  There is a small clot present in the vaginal vault.  Abdomen is nontender.  She again denies any abdominal pain.  Review of the fetal heart strip shows fetal heart tones in the 130s.  No contractions.  Good variability and accelerations, no decelerations          Pregnancy history:     OBSTETRIC HISTORY:    Obstetric History       T2      L2     SAB2   TAB0   Ectopic0   Multiple0   Live Births2       # Outcome Date GA Lbr Lukasz/2nd Weight Sex Delivery Anes PTL Lv   6 Current            5 Term 02/27/15 40w5d 08:30 / 02:01 8 lb 12.9 oz (3.994 kg) F Vag-Spont EPI N SALVATORE      Apgar1:  9               Apgar5: 10   4 AB 14 5w0d          3 SAB /13 4w0d       ND   2 SAB /12 5w0d       DEC   1 Term 11 39w6d 02:44 / 01:25 7 lb 12.5 oz (3.53 kg) F Vag-Spont Spinal,EPI N SALVATORE      Name: Margaret      Apgar1:  9                Apgar5: 9      Obstetric Comments   EDC 2018.   to Didier.       EDC: Estimated Date  of Delivery: Aug 16, 2018    Prenatal Labs:   Lab Results   Component Value Date    ABO O 06/19/2018    RH Pos 06/19/2018    AS Neg 06/19/2018    HEPBANG Nonreactive 03/08/2018    CHPCRT Negative 03/08/2018    GCPCRT Negative 03/08/2018    TREPAB Negative 03/12/2018    RUBELLAABIGG 80 01/03/2014    HGB 11.4 (L) 06/19/2018    HIV Negative 09/27/2012       GBS Status:   Lab Results   Component Value Date    GBS (A) 01/29/2015     Positive  Positive: GBS DNA detected, presumed positive for GBS.   Assay performed on incubated broth culture of specimen using Leevia real-time   PCR.         Active Problem List  Patient Active Problem List   Diagnosis     CARDIOVASCULAR SCREENING; LDL GOAL LESS THAN 160     Mass     PAC (premature atrial contraction)     PVC (premature ventricular contraction)     Habitual aborter, currently pregnant     Supervision of high-risk pregnancy of elderly multigravida     Complete placenta previa nos or without hemorrhage, unspecified trimester     Vaginal bleeding     Bleeding       Medication Prior to Admission  No prescriptions prior to admission.   .        Maternal Past Medical History:     Past Medical History:   Diagnosis Date     Closed fracture of unspecified part of humerus 1981    fx of radius     Lyme disease     history of     PAC (premature atrial contraction) 8/2013    Holter     PVC (premature ventricular contraction) 8/2013    Holter                       Family History:   I have reviewed this patient's family history and commented on sigificant items within the HPI            Social History:   I have reviewed this patient's social history and commented on significant items within the HPI         Review of Systems:   The Review of Systems is negative other than noted in the HPI          Physical Exam:                      No data found.      gen - well appearing  CV - RRR  Lungs: CTAB  Abd- gravid, non tender  gu- scant blood in vault. cerix loooks closed. No active  bleeding    Extremities:   Warm, well perfused  Edema absent         Assessment:   Enid Cisneros is a 32w6d pregnant female admitted with .          Plan:       Dex Charles MD   Discussed with on call Kate PORTILLO earlier today. They advised BTM and observation for 24 hrs. Will plan to discuss with them again tomorrow. If actively bleeding, transfer. Patient declined BTM after discussion of r/b/a. rh  .  I will plan to keep her here for a few hours of observation and if no further bleeding discharge her to home.  Continue pelvic rest.

## 2018-06-20 ENCOUNTER — APPOINTMENT (OUTPATIENT)
Dept: FAMILY MEDICINE | Facility: CLINIC | Age: 38
End: 2018-06-20
Payer: COMMERCIAL

## 2018-06-20 VITALS
SYSTOLIC BLOOD PRESSURE: 115 MMHG | HEART RATE: 69 BPM | RESPIRATION RATE: 14 BRPM | TEMPERATURE: 98 F | DIASTOLIC BLOOD PRESSURE: 58 MMHG

## 2018-06-20 DIAGNOSIS — O44.03 PLACENTA PREVIA IN THIRD TRIMESTER: Primary | ICD-10-CM

## 2018-06-20 PROCEDURE — 59025 FETAL NON-STRESS TEST: CPT | Mod: 26 | Performed by: FAMILY MEDICINE

## 2018-06-20 PROCEDURE — 59025 FETAL NON-STRESS TEST: CPT | Mod: 76

## 2018-06-20 PROCEDURE — 25000128 H RX IP 250 OP 636: Performed by: FAMILY MEDICINE

## 2018-06-20 RX ADMIN — BETAMETHASONE SODIUM PHOSPHATE AND BETAMETHASONE ACETATE 12 MG: 3; 3 INJECTION, SUSPENSION INTRA-ARTICULAR; INTRALESIONAL; INTRAMUSCULAR at 10:59

## 2018-06-20 NOTE — DISCHARGE INSTRUCTIONS
OB Triage Discharge Instructions    Diet:  Regular diet as tolerated    Activity:  As tolerated, rest and no heavy lifting     Other Special Instructions: Return to the Birthing Unit at 1100 tomorrow for her second dose of Betamethasone.     Reason for being seen in L&D: vaginal bleeding    Treatment/procedures performed in L&D: monitoring, NST's bedrest     Call the [facility]place at [phone] if you have:  ? 5 or more contractions in one hour  ? Leaking of fluid from your vaginal area  ? Decreased fetal movement (follow kick count instructions)  ? Bleeding from your vaginal area  ? Swelling in your face, or increased swelling in your hands or legs  ? Headaches or vision problems such as blurring or seeing spots or starts  ? Nausea or vomiting lasting for more than 24 hours  ? An increase in weight (5lbs/week)  ? Epigastric pain (sometimes confused with heartburn that does not go away or a very bad stomach ache)    If you have any questions, please follow up with your doctor.        Patient Signature: ______________________________________________________________  By signing the above I acknowledge that a nurse or my care provider has explained the instruction to me and I have had any questions regarding my care explained to me.        Discharge Nurse Signature: _______________________________ 6/20/2018  1:08 PM    Method of discharge: ambulation    Accompanied by: self  Time of discharge: 2835

## 2018-06-20 NOTE — PROGRESS NOTES
Discussed with on call Kate PORTILLO earlier today. They advised BTM and observation for 24 hrs. Will plan to discuss with them again tomorrow. If actively bleeding, transfer. Patient declined BTM after discussion of r/b/a. rh

## 2018-06-20 NOTE — PROGRESS NOTES
S:  Discharge to home.  A:  FHT's 125, Moderate variability, Accels present, no decels noted. Contractions none.  Cervical exam not checked at this inpatient visit. Will return tomorrow at 1100 for second dose of Betamethasone.   R:  Written and verbal D/C instruction provided. Pt. Verbalized understanding of D/C instructions and will follow up with JONO MCPHERSON next Wednesday at 1145. Will also be seen by Dr Love at 36 weeks at [place].   Verbalizes understanding that she will call or return to the [facility] with any further questions or concerns.   Pt. D/C'd via ambulation with self.    Nursing Discharge Checklist  Discharge order entered: Yes  Patient care order entered: N/a  Charges entered: Yes  IV start and stop times have been documented in Epic? N/A  NST start and stop times have been documented in Epic Doc F/S? Yes

## 2018-06-20 NOTE — PLAN OF CARE
Problem: Patient Care Overview  Goal: Plan of Care/Patient Progress Review  Outcome: Improving  S: Shift note 6070-7105    B: Marginal placenta previa @ 31w5d, vaginal bleeding     A: VSS, one episode of trace amounts of bright red blood on toilet paper at 2130 this evening when patient was up to bathroom. Denies pain. Had reactive NST @ 1830. Plan of care explained. Monitor off for the night unless clinically indicated. Patient instructed to call nurse if evidence of bleeding or pain during the night.     R: Continue to monitor as ordered and maintain activity as BR with BRP. Plan for NST in a.m.   Report to oncoming RN for shift change @ 2300.

## 2018-06-20 NOTE — PROGRESS NOTES
Patient reports no bleeding during the night or this morning. Has been up to the bathroom a few times now.   Denies cramping or pain.   On monitor now for NST

## 2018-06-21 ENCOUNTER — HOSPITAL ENCOUNTER (OUTPATIENT)
Facility: CLINIC | Age: 38
Discharge: HOME | End: 2018-06-21
Attending: FAMILY MEDICINE | Admitting: FAMILY MEDICINE
Payer: COMMERCIAL

## 2018-06-21 VITALS
HEART RATE: 85 BPM | TEMPERATURE: 97.5 F | DIASTOLIC BLOOD PRESSURE: 57 MMHG | SYSTOLIC BLOOD PRESSURE: 109 MMHG | RESPIRATION RATE: 16 BRPM

## 2018-06-21 PROBLEM — R58 BLEEDING: Status: ACTIVE | Noted: 2018-06-21

## 2018-06-21 PROCEDURE — 96372 THER/PROPH/DIAG INJ SC/IM: CPT

## 2018-06-21 PROCEDURE — 25000128 H RX IP 250 OP 636: Performed by: FAMILY MEDICINE

## 2018-06-21 RX ORDER — BETAMETHASONE SODIUM PHOSPHATE AND BETAMETHASONE ACETATE 3; 3 MG/ML; MG/ML
12 INJECTION, SUSPENSION INTRA-ARTICULAR; INTRALESIONAL; INTRAMUSCULAR; SOFT TISSUE ONCE
Status: COMPLETED | OUTPATIENT
Start: 2018-06-21 | End: 2018-06-21

## 2018-06-21 RX ADMIN — BETAMETHASONE SODIUM PHOSPHATE AND BETAMETHASONE ACETATE 12 MG: 3; 3 INJECTION, SUSPENSION INTRA-ARTICULAR; INTRALESIONAL; INTRAMUSCULAR at 11:21

## 2018-06-21 NOTE — PROGRESS NOTES
pt at 32 weeks gestation with the history of full placenta previa & experiencing bleeding 2 days ago.  Pt  presents for her second Betamethasone injection.  Pt's VVS, denies any bleeding, leaking of fluid or pain at this time.  Will administer medication when available, pt has no questions regarding plan of care.

## 2018-06-27 ENCOUNTER — PRENATAL OFFICE VISIT (OUTPATIENT)
Dept: FAMILY MEDICINE | Facility: CLINIC | Age: 38
End: 2018-06-27
Payer: COMMERCIAL

## 2018-06-27 VITALS
HEART RATE: 80 BPM | OXYGEN SATURATION: 96 % | DIASTOLIC BLOOD PRESSURE: 72 MMHG | WEIGHT: 215 LBS | SYSTOLIC BLOOD PRESSURE: 122 MMHG | RESPIRATION RATE: 18 BRPM | TEMPERATURE: 97.6 F | BODY MASS INDEX: 33.67 KG/M2

## 2018-06-27 DIAGNOSIS — O09.529 SUPERVISION OF HIGH-RISK PREGNANCY OF ELDERLY MULTIGRAVIDA: Primary | ICD-10-CM

## 2018-06-27 DIAGNOSIS — O44.00 COMPLETE PLACENTA PREVIA NOS OR WITHOUT HEMORRHAGE, UNSPECIFIED TRIMESTER: ICD-10-CM

## 2018-06-27 PROCEDURE — 99207 ZZC COMPLICATED OB VISIT: CPT | Performed by: PHYSICIAN ASSISTANT

## 2018-06-27 ASSESSMENT — PAIN SCALES - GENERAL: PAINLEVEL: NO PAIN (0)

## 2018-06-27 NOTE — PROGRESS NOTES
Doing well.  No concerns today.  She has had no vaginal bleeding since being up in the OB department 2018.  Next OB appointment is with Dr. De León next week.  He will be discussing delivery at [place] or [place] given placenta previa and need for 37 week .  I had offered to start setting up consultation for her, but she would like to meet with him to discuss first.  No vaginal bleeding, leakage, or contractions.  Baby's movement is active and frequent.  Taking PNV.  Discussed kick counts and fetal movement.  Signs/symptoms of hypertension and pre-eclampsia reviewed with patient today. She should contact the hospital OB dept if any new concerning symptoms.  Also is aware that if there is any bleeding she should be seen immediately in OB.  Reminded of upcoming GBS swab.      RTC in 2 weeks.     Electronically signed by Dorothy Alatorre PA-C  2018

## 2018-06-27 NOTE — MR AVS SNAPSHOT
After Visit Summary   6/27/2018    Enid Cisneros    MRN: [id number]           Patient Information     Date Of Birth          1980        Visit Information        Provider Department      6/27/2018 11:45 AM Dorothy Alatorre PA-C [facility]        Today's Diagnoses     Supervision of high-risk pregnancy of elderly multigravida    -  1    Complete placenta previa nos or without hemorrhage, unspecified trimester           Follow-ups after your visit        Your next 10 appointments already scheduled     Jul 03, 2018  1:00 PM CDT   [facility]    [address] [place] [id number]   [phone]            Jul 18, 2018 10:00 AM CDT   ESTABLISHED PRENATAL with Dorothy Alatorre PA-C   [facility]    [address] [place] [address]   [phone]            Jul 26, 2018 10:00 AM CDT   ESTABLISHED PRENATAL with Dorothy Alatorre [facility]   [facility]    [address] [place] [address]   [phone]            Aug 02, 2018 11:30 AM CDT   ESTABLISHED PRENATAL with Dorothy Alatorre PA-C   [facility]    [address] [place] [address]   [phone]            Aug 09, 2018 10:00 AM CDT   ESTABLISHED PRENATAL with Dorothy Alatorre PA-C   [facility]    [address] [place] [phone]   [phone]              Who to contact     If you have questions or need follow up information about today's clinic visit or your schedule please contact [facility] directly at [phone].  Normal or non-critical lab and imaging results will be communicated to you by MyChart, letter or phone within 4 business days after the clinic has received the results. If  you do not hear from us within 7 days, please contact the clinic through [employer] or phone. If you have a critical or abnormal lab result, we will notify you by phone as soon as possible.  Submit refill requests through [employer] or call your pharmacy and they will forward the refill request to us. Please allow 3 business days for your refill to be completed.          Additional Information About Your Visit        [employer] Information     [employer] gives you secure access to your electronic health record. If you see a primary care provider, you can also send messages to your care team and make appointments. If you have questions, please call your primary care clinic.  If you do not have a primary care provider, please call [phone] and they will assist you.        Care EveryWhere ID     This is your Care EveryWhere ID. This could be used by other organizations to access your [facility] medical records  [id number]        Your Vitals Were     Pulse Temperature Respirations Last Period Pulse Oximetry BMI (Body Mass Index)    80 97.6  F (36.4  C) (Temporal) 18 11/23/2017 (Exact Date) 96% 33.67 kg/m2       Blood Pressure from Last 3 Encounters:   06/27/18 122/72   06/21/18 109/57   06/20/18 115/58    Weight from Last 3 Encounters:   06/27/18 215 lb (97.5 kg)   06/06/18 215 lb (97.5 kg)   05/09/18 207 lb (93.9 kg)              Today, you had the following     No orders found for display       Primary Care Provider Office Phone # Fax #    Dorothy Alatorre PA-C [phone] [address] [place] [address]        Equal Access to Services     [facility]: Hadii aad ku hadasho Soomaali, waaxda luqadaha, qaybta kaalmada adeegyada, anjana salazar . So [facility] [phone].    ATENCIÓN: Si habla español, tiene a hines disposición servicios gratuitos de asistencia lingüística. Llame al [phone].    We comply with applicable federal civil rights laws and [place] laws. We do not  discriminate on the basis of race, color, national origin, age, disability, sex, sexual orientation, or gender identity.            Thank you!     Thank you for choosing [facility]  for your care. Our goal is always to provide you with excellent care. Hearing back from our patients is one way we can continue to improve our services. Please take a few minutes to complete the written survey that you may receive in the mail after your visit with us. Thank you!             Your Updated Medication List - Protect others around you: Learn how to safely use, store and throw away your medicines at www.disposemymeds.org.          This list is accurate as of 6/27/18  2:12 PM.  Always use your most recent med list.                   Brand Name Dispense Instructions for use Diagnosis    prenatal multivitamin plus iron 27-0.8 MG Tabs per tablet     1 tablet    Take 1 tablet by mouth daily    Amenorrhea

## 2018-06-27 NOTE — DISCHARGE SUMMARY
Discharge summary     Date of discharge June 20, 2018  Date of admission June 19, 2018    Hospital course:  Patient kept overnight.  She was admitted with vaginal bleeding and a history of previa.  There is no further bleeding.  She was discharged home.  There is no contractions and the fetal heart tones were category 1 throughout.    Discharge diagnosis  Vaginal bleeding  Placenta previa    Follow-up: To see primary OB in 2-3 days, return if bleeding recurs    Consultations: LINDSEY Charles MD

## 2018-06-27 NOTE — NURSING NOTE
"Chief Complaint   Patient presents with     Prenatal Care       Initial /72  Pulse 80  Temp 97.6  F (36.4  C) (Temporal)  Resp 18  Wt 215 lb (97.5 kg)  LMP 11/23/2017 (Exact Date)  SpO2 96%  BMI 33.67 kg/m2 Estimated body mass index is 33.67 kg/(m^2) as calculated from the following:    Height as of 3/8/18: 5' 7\" (1.702 m).    Weight as of this encounter: 215 lb (97.5 kg).  BP completed using cuff size: manda Arevalo MA      "

## 2018-06-30 ENCOUNTER — HOSPITAL ENCOUNTER (INPATIENT)
Facility: CLINIC | Age: 38
LOS: 26 days | Discharge: HOME HEALTH | End: 2018-07-26
Attending: OBSTETRICS & GYNECOLOGY | Admitting: OBSTETRICS & GYNECOLOGY
Payer: COMMERCIAL

## 2018-06-30 ENCOUNTER — HOSPITAL ENCOUNTER (OUTPATIENT)
Facility: CLINIC | Age: 38
Discharge: NURSING, REHAB OR LONG-TERM CARE | End: 2018-06-30
Attending: FAMILY MEDICINE | Admitting: FAMILY MEDICINE
Payer: COMMERCIAL

## 2018-06-30 DIAGNOSIS — Z98.891 S/P C-SECTION: Primary | ICD-10-CM

## 2018-06-30 PROBLEM — O44.00 PLACENTA PREVIA: Status: ACTIVE | Noted: 2018-06-30

## 2018-06-30 PROBLEM — Z36.89 ENCOUNTER FOR TRIAGE IN PREGNANT PATIENT: Status: ACTIVE | Noted: 2018-06-30

## 2018-06-30 LAB
ABO + RH BLD: NORMAL
BASOPHILS # BLD AUTO: 0.1 10E9/L (ref 0–0.2)
BASOPHILS NFR BLD AUTO: 0.5 %
BLD GP AB SCN SERPL QL: NORMAL
BLD GP AB SCN SERPL QL: NORMAL
BLD PROD TYP BPU: NORMAL
BLOOD BANK CMNT PATIENT-IMP: NORMAL
BLOOD BANK CMNT PATIENT-IMP: NORMAL
DIFFERENTIAL METHOD BLD: ABNORMAL
EOSINOPHIL # BLD AUTO: 0.1 10E9/L (ref 0–0.7)
EOSINOPHIL NFR BLD AUTO: 1 %
ERYTHROCYTE [DISTWIDTH] IN BLOOD BY AUTOMATED COUNT: 12.9 % (ref 10–15)
HCT VFR BLD AUTO: 35.7 % (ref 35–47)
HGB BLD-MCNC: 11.4 G/DL (ref 11.7–15.7)
IMM GRANULOCYTES # BLD: 0.3 10E9/L (ref 0–0.4)
IMM GRANULOCYTES NFR BLD: 2.5 %
LYMPHOCYTES # BLD AUTO: 2.9 10E9/L (ref 0.8–5.3)
LYMPHOCYTES NFR BLD AUTO: 21 %
MCH RBC QN AUTO: 29.2 PG (ref 26.5–33)
MCHC RBC AUTO-ENTMCNC: 31.9 G/DL (ref 31.5–36.5)
MCV RBC AUTO: 92 FL (ref 78–100)
MONOCYTES # BLD AUTO: 0.7 10E9/L (ref 0–1.3)
MONOCYTES NFR BLD AUTO: 5.4 %
NEUTROPHILS # BLD AUTO: 9.5 10E9/L (ref 1.6–8.3)
NEUTROPHILS NFR BLD AUTO: 69.6 %
NRBC # BLD AUTO: 0 10*3/UL
NRBC BLD AUTO-RTO: 0 /100
NUM BPU REQUESTED: 2
PLATELET # BLD AUTO: 182 10E9/L (ref 150–450)
RBC # BLD AUTO: 3.9 10E12/L (ref 3.8–5.2)
SPECIMEN EXP DATE BLD: NORMAL
SPECIMEN EXP DATE BLD: NORMAL
WBC # BLD AUTO: 13.6 10E9/L (ref 4–11)

## 2018-06-30 PROCEDURE — 93005 ELECTROCARDIOGRAM TRACING: CPT

## 2018-06-30 PROCEDURE — 86923 COMPATIBILITY TEST ELECTRIC: CPT | Performed by: INTERNAL MEDICINE

## 2018-06-30 PROCEDURE — 12000030 ZZH R&B OB INTERMEDIATE UMMC

## 2018-06-30 PROCEDURE — G0463 HOSPITAL OUTPT CLINIC VISIT: HCPCS

## 2018-06-30 PROCEDURE — 86901 BLOOD TYPING SEROLOGIC RH(D): CPT | Performed by: INTERNAL MEDICINE

## 2018-06-30 PROCEDURE — 86900 BLOOD TYPING SEROLOGIC ABO: CPT | Performed by: FAMILY MEDICINE

## 2018-06-30 PROCEDURE — 96360 HYDRATION IV INFUSION INIT: CPT

## 2018-06-30 PROCEDURE — 86850 RBC ANTIBODY SCREEN: CPT | Performed by: INTERNAL MEDICINE

## 2018-06-30 PROCEDURE — 25000125 ZZHC RX 250

## 2018-06-30 PROCEDURE — 36415 COLL VENOUS BLD VENIPUNCTURE: CPT | Performed by: INTERNAL MEDICINE

## 2018-06-30 PROCEDURE — 25000128 H RX IP 250 OP 636: Performed by: INTERNAL MEDICINE

## 2018-06-30 PROCEDURE — 96372 THER/PROPH/DIAG INJ SC/IM: CPT

## 2018-06-30 PROCEDURE — 86850 RBC ANTIBODY SCREEN: CPT | Performed by: FAMILY MEDICINE

## 2018-06-30 PROCEDURE — 85025 COMPLETE CBC W/AUTO DIFF WBC: CPT | Performed by: INTERNAL MEDICINE

## 2018-06-30 PROCEDURE — 86901 BLOOD TYPING SEROLOGIC RH(D): CPT | Performed by: FAMILY MEDICINE

## 2018-06-30 PROCEDURE — 99231 SBSQ HOSP IP/OBS SF/LOW 25: CPT | Performed by: PHYSICIAN ASSISTANT

## 2018-06-30 PROCEDURE — 86900 BLOOD TYPING SEROLOGIC ABO: CPT | Performed by: INTERNAL MEDICINE

## 2018-06-30 RX ORDER — SODIUM CHLORIDE, SODIUM LACTATE, POTASSIUM CHLORIDE, CALCIUM CHLORIDE 600; 310; 30; 20 MG/100ML; MG/100ML; MG/100ML; MG/100ML
INJECTION, SOLUTION INTRAVENOUS CONTINUOUS
Status: DISCONTINUED | OUTPATIENT
Start: 2018-06-30 | End: 2018-07-09

## 2018-06-30 RX ORDER — LIDOCAINE HYDROCHLORIDE 10 MG/ML
INJECTION, SOLUTION EPIDURAL; INFILTRATION; INTRACAUDAL; PERINEURAL
Status: COMPLETED
Start: 2018-06-30 | End: 2018-06-30

## 2018-06-30 RX ORDER — LIDOCAINE 40 MG/G
CREAM TOPICAL
Status: DISCONTINUED | OUTPATIENT
Start: 2018-06-30 | End: 2018-07-23

## 2018-06-30 RX ORDER — PRENATAL VIT/IRON FUM/FOLIC AC 27MG-0.8MG
1 TABLET ORAL DAILY
Status: DISCONTINUED | OUTPATIENT
Start: 2018-07-01 | End: 2018-07-26 | Stop reason: HOSPADM

## 2018-06-30 RX ORDER — ONDANSETRON 2 MG/ML
4 INJECTION INTRAMUSCULAR; INTRAVENOUS EVERY 6 HOURS PRN
Status: DISCONTINUED | OUTPATIENT
Start: 2018-06-30 | End: 2018-07-23

## 2018-06-30 RX ORDER — ONDANSETRON 2 MG/ML
4 INJECTION INTRAMUSCULAR; INTRAVENOUS EVERY 6 HOURS PRN
Status: DISCONTINUED | OUTPATIENT
Start: 2018-06-30 | End: 2018-06-30 | Stop reason: HOSPADM

## 2018-06-30 RX ORDER — ACETAMINOPHEN 325 MG/1
975 TABLET ORAL EVERY 4 HOURS PRN
Status: DISCONTINUED | OUTPATIENT
Start: 2018-06-30 | End: 2018-07-11

## 2018-06-30 RX ORDER — SODIUM CHLORIDE, SODIUM LACTATE, POTASSIUM CHLORIDE, CALCIUM CHLORIDE 600; 310; 30; 20 MG/100ML; MG/100ML; MG/100ML; MG/100ML
INJECTION, SOLUTION INTRAVENOUS CONTINUOUS
Status: DISCONTINUED | OUTPATIENT
Start: 2018-06-30 | End: 2018-06-30 | Stop reason: HOSPADM

## 2018-06-30 RX ADMIN — SODIUM CHLORIDE, POTASSIUM CHLORIDE, SODIUM LACTATE AND CALCIUM CHLORIDE: 600; 310; 30; 20 INJECTION, SOLUTION INTRAVENOUS at 13:40

## 2018-06-30 RX ADMIN — LIDOCAINE HYDROCHLORIDE 1 MG: 10 INJECTION, SOLUTION EPIDURAL; INFILTRATION; INTRACAUDAL; PERINEURAL at 11:14

## 2018-06-30 NOTE — H&P
Antepartum History and Physical    Enid Cisneros MRN# [id number]   Age: 38 year old YOB: 1980     Date of Admission: 2018    Primary care provider: Dorothy Alatorre         CC:    Vaginal bleeding         HPI:   Enid Cisneros is a 38 year old  at 33w2d by 22w6d US with known placenta previa admitted to the antepartum service for her second episode of vaginal bleeding. She was most recently admitted to [facility] from - for her first episode of bleeding, during which time she received two doses of betamethasone. Her bleeding completely resolved overnight during that admission, fetal status was reassuring, and she was discharged to home with strict return precautions.     When Ms. Cisneros got up to use the restroom overnight at 0300, she noticed the toilet was filled with blood. She kept wiping with toilet paper, and the bleeding seemed to be improving. She then checked her bleeding every half hour for the next several hours. She would have bleeding with wiping, but it seemed to be slowing down over time. At 1030, she passed a quarter size blood clot, called her local hospital, and went in for evaluation. She was evaluated at [facility] with a sterile speculum exam that showed a moderate amount of blood and clot in the vault and a visually closed cervix. Fetal heart tracing was appropriate for gestational age. Given her gestational age and second bleed in the context of placenta previa, she was transferred to [facility] for [facility] management. Since the speculum exam, Ms. Cisneros has not had continued bleeding. She has not had to wear a menstrual pad.    Ms. Cisneros has otherwise been feeling well. She had mild lower abdominal cramping that has since resolved and reports good fetal movement. She denies regular contractions or loss of fluid. She denies fever, chills, chest pain, shortness of breath, nausea, vomiting, headache, changes in vision, and dysuria.     OB Problem List:   1.  IUP at 33w2d   2. Placenta previa. Two episodes of vaginal bleeding. Hospitalized from - for observation with resolution of bleeding. S/p 2 doses betamethasone.            Pregnancy history:     OBSTETRIC HISTORY:    Obstetric History       T2      L2     SAB2   TAB0   Ectopic0   Multiple0   Live Births2       # Outcome Date GA Lbr Lukasz/2nd Weight Sex Delivery Anes PTL Lv   6 Current            5 Term 02/27/15 40w5d 08:30 / 02:01 3.994 kg (8 lb 12.9 oz) F Vag-Spont EPI N SALVATORE      Apgar1:  9               Apgar5: 10   4 AB 14 5w0d          3 SAB 13 4w0d       ND   2 SAB 12 5w0d       DEC   1 Term 11 39w6d 02:44 / 01:25 3.53 kg (7 lb 12.5 oz) F Vag-Spont Spinal,EPI N SALVATORE      Name: Margaret      Apgar1:  9                Apgar5: 9      Obstetric Comments   EDC 2018.   to Didier.       Prenatal Labs:   Lab Results   Component Value Date    ABO O 2018    RH Pos 2018    AS Neg 2018    HEPBANG Nonreactive 2018    CHPCRT Negative 2018    GCPCRT Negative 2018    TREPAB Negative 2018    RUBELLAABIGG 80 2014    HGB 11.4 (L) 2018    HIV Negative 2012     Active Problem List  Patient Active Problem List   Diagnosis     CARDIOVASCULAR SCREENING; LDL GOAL LESS THAN 160     Mass     PAC (premature atrial contraction)     PVC (premature ventricular contraction)     Habitual aborter, currently pregnant     Supervision of high-risk pregnancy of elderly multigravida     Complete placenta previa nos or without hemorrhage, unspecified trimester     Vaginal bleeding     Bleeding     Encounter for triage in pregnant patient     Placenta previa       Medication Prior to Admission  Prescriptions Prior to Admission   Medication Sig Dispense Refill Last Dose     Prenatal Vit-Fe Fumarate-FA (PRENATAL MULTIVITAMIN PLUS IRON) 27-0.8 MG TABS per tablet Take 1 tablet by mouth daily 1 tablet 0 2018 at 1200            Maternal Past Medical History:     Past Medical History:   Diagnosis Date     Closed fracture of unspecified part of humerus 1981    fx of radius     Lyme disease     history of     PAC (premature atrial contraction) 8/2013    Holter     PVC (premature ventricular contraction) 8/2013    Holter   Per patient report is not taking any medications for the history of PVCs or PACs, which was diagnosed in 2013. No further cardiac issues.                    Family History:     Family History   Problem Relation Age of Onset     Cancer Mother      breast     No Known Problems Father      Cerebrovascular Disease Maternal Grandmother      Diabetes Maternal Grandmother      adult-onset     HEART DISEASE Maternal Grandfather      Cancer Paternal Grandmother      HEART DISEASE Paternal Grandfather      No Known Problems Brother             Social History:     Social History     Social History     Marital status:      Spouse name: Didier     Number of children: 1     Years of education: N/A     Occupational History      Unemployed     Social History Main Topics     Smoking status: Never Smoker     Smokeless tobacco: Never Used     Alcohol use No     Drug use: No     Sexual activity: Yes     Partners: Male     Other Topics Concern      Service No     Blood Transfusions No     Caffeine Concern No     Occupational Exposure No     Stay-at-home mom     Hobby Hazards No     Sleep Concern No     Stress Concern No     Weight Concern No     Special Diet No     Back Care Yes     Herniated disc/discectomy - 2002     Exercise Yes     3-4 times a week     Seat Belt Yes     Self-Exams No     Social History Narrative     and lives in [place] with , Didier and daughters, Margaret and Lamar.  No smokers in the home.  Outdoor cats.  No concerns about domestic violence.            Review of Systems:   Negative except as noted above in the HPI         Physical Exam:     Vitals:    06/30/18 1308   BP: 114/60   Resp: 18    Temp: 98.4  F (36.9  C)   TempSrc: Oral     Gen: Alert and oriented in NAD  Cardio: RRR  Resp: CTAB   Abdomen: gravid, soft, nontender. EFW 2032 g, 66%ile (by M US on )  Cervix: deferred  Presentation: transverse, head to maternal left  Fetal Heart Rate Tracing: baseline 145 bpm, moderate variability, + accels, no decels  Tocometer: quiet    Imaging:  3/1/18: 15w4d, posterior placenta, marginal previa, single IUP, JEWEL 11.5 cm  18: 22w2d, anterior complete previa, cervix 2.7cm, JEWEL wnl, estimated gestational age measuring 2 weeks more advanced than previously established dating  5/10/18: 25w5d, anterior complete previa, 3VC, normal JEWEL  18: 31w3d, anterior previa, normal JEWEL, EFW 2032g (66 %ile)          Assessment:   Enid Cisneros is a 38 year old  at 33w2d by 22w6d US admitted for her second episode of vaginal bleeding in the context of a known anterior placenta previa. Plan for inpatient management until delivery as indicated by increased in vaginal bleeding or change in fetal status.         Plan:   Admit to antepartum service.    1. Placenta previa  - Anterior placenta previa. LTCS would inevitably require incision through placenta and fast delivery of baby. No delayed cord clamping. NICU team will be present given gestational age and previa.  - S/p  consent.  - NO digital cervical exams.  - NPO with IV fluids until we are able to better assess fetal status and level of bleeding.   - Pad weights. Encouraged patient to contact team if she has an increase in her vaginal bleeding. Currently stable. Hgb 11.4 on admission (stable from hospitalization 1 week ago).  - T&C for 2 units. Discussed the increased risk of bleeding associated with a placenta previa. Patient consented to blood transfusion as medically indicated. Also discussed low risk for possible hysterectomy at the time of  section as an emergency measure to save the patient's life if unable to stop bleeding.      2. Fetal well being  - FHT reactive and appropriate for gestational age. Continuous EFM.  - S/p BMZ x2 (-)  - EFW 66 %ile, 2032g on 18  - NICU consult placed    3. Prenatal care  - Rh positive. Rubella immune.  - Placenta: anterior previa.     4. History of PACs, PVCs  - S/p 48h Holter monitor in  for palpitations. No recent symptoms.   - Baseline EKG ordered on admission    Rosalina Duggan MD  Resident Physician-PGY2   2018, 1:56 PM     Physician Attestation   IPaulo, saw this patient with the resident and agree with the resident and/or medical student's findings and plan of care as documented in the note.      I personally reviewed vital signs, medications, labs, imaging and EFM.    Key findings: In summary, Enid Cisneros is a  at 33w3d admitted with vaginal bleeding presumed related to know placenta previa.  Currently maternal and fetal status are stable and reassuring and will continue inpatient expectant management at this time.    Paulo Pena MD  Date of Service (when I saw the patient): 18    Time Spent on this Encounter   IPaulo, spent a total of 30 minutes bedside and on the inpatient unit today managing the care of Enid Cisneros.  Over 50% of my time on the unit was spent counseling the patient and /or coordinating care regarding pregnancy complicated by 2nd episode of bleeding presumed related to known placenta previa. See note for details.    Paulo Pena

## 2018-06-30 NOTE — PROGRESS NOTES
Speculum exam done by DR. Charles, see his note. No active bleeding at this time, 2cm bright red spot on pad from admit. IV LR infusing, 2nd SL started

## 2018-06-30 NOTE — IP AVS SNAPSHOT
MRN:[id number]                      After Visit Summary   6/30/2018    Enid Cisneros    MRN: [id number]           Thank you!     Thank you for choosing [facility] for your care. Our goal is always to provide you with excellent care. Hearing back from our patients is one way we can continue to improve our services. Please take a few minutes to complete the written survey that you may receive in the mail after you visit with us. Thank you!        Patient Information     Date Of Birth          1980        About your hospital stay     You were admitted on:  June 30, 2018 [facility]    You were discharged on:  July 26, 2018        Reason for your hospital stay       Maternity care                  Who to Call     For medical emergencies, please call 911.  For non-urgent questions about your medical care, please call your primary care provider or clinic, [phone]  For questions related to your surgery, please call your surgery clinic        Attending Provider     Provider Specialty    Abiola Gallagher MD OB/Gyn    Art, Colin Mercedes MD OB/Gyn    Paulo Pena MD OB/Gyn    Annette, Cricket SIMMONS MD Obstetrics & Gynecology, Maternal & Fetal Medicine    Ana Rosa Arellano MD OB/Gyn       Primary Care Provider Office Phone # Fax #    Dorothy Alatorre PA-C [phone]      After Care Instructions     Activity       Review discharge instructions            Diet       Resume previous diet            Discharge Instructions - Postpartum visit       Schedule postpartum visit with your provider and return to clinic in 6 weeks.    No lifting greater than 20lbs for 6 weeks. Do not drive until able to slam on your brakes without pain. Do not drive while taking narcotics. You may shower. Do not soak incision in bath, hot tub, lake/pool until completely healed. Nothing in the vagina for 6 weeks.                  Follow-up Appointments     Follow Up and  recommended labs and tests       Follow up with primary OB provider in 6 weeks for postpartum visit.                  Further instructions from your care team       Postop  Birth Instructions    Activity       Do not lift more than 10 pounds for 6 weeks after surgery.  Ask family and friends for help when you need it.    No driving until you have stopped taking your pain medications (usually two weeks after surgery).    No heavy exercise or activity for 6 weeks.  Don't do anything that will put a strain on your surgery site.    Don't strain when using the toilet.  Your care team may prescribe a stool softener if you have problems with your bowel movements.     To care for your incision:       Keep the incision clean and dry.    Do not soak your incision in water. No swimming or hot tubs until it has fully healed. You may soak in the bathtub if the water level is below your incision.    Do not use peroxide, gel, cream, lotion, or ointment on your incision.    Adjust your clothes to avoid pressure on your surgery site (check the elastic in your underwear for example).     You may see a small amount of clear or pink drainage and this is normal.  Check with your health care provider:       If the drainage increases or has an odor.    If the incision reddens, you have swelling, or develop a rash.    If you have increased pain and the medicine we prescribed doesn't help.    If you have a fever above 100.4 F (38 C) with or without chills when placing thermometer under your tongue.   The area around your incision (surgery wound), will feel numb.  This is normal. The numbness should go away in less than a year.     Keep your hands clean:  Always wash your hands before touching your incision (surgery wound). This helps reduce your risk of infection. If your hands aren't dirty, you may use an alcohol hand-rub to clean your hands. Keep your nails clean and short.    Call your healthcare provider if you have any of these  symptoms:       You soak a sanitary pad with blood within 1 hour, or you see blood clots larger than a golf ball.    Bleeding that lasts more than 6 weeks.    Vaginal discharge that smells bad.    Severe pain, cramping or tenderness in your lower belly area.    A need to urinate more frequently (use the toilet more often), more urgently (use the toilet very quickly), or it burns when you urinate.    Nausea and vomiting.    Redness, swelling or pain around a vein in your leg.    Problems breastfeeding or a red or painful area on your breast.    Chest pain and cough or are gasping for air.    Problems with coping with sadness, anxiety or depression. If you have concerns about hurting yourself or the baby, call your provider immediately.      You have questions or concerns after you return home.                  Pending Results     Date and Time Order Name Status Description    7/23/2018 1728 Placenta path order and indications In process             Statement of Approval     Ordered          07/26/18 0901  I have reviewed and agree with all the recommendations and orders detailed in this document.  EFFECTIVE NOW     Approved and electronically signed by:  Charito Lynn MD             Admission Information     Date & Time Provider Department Dept. Phone    6/30/2018 Ana Rosa Arellano MD [facility] [phone]      Your Vitals Were     Blood Pressure Pulse Temperature Respirations Weight Last Period    111/72 80 98  F (36.7  C) (Oral) 16 99.9 kg (220 lb 3.2 oz) 11/23/2017 (Exact Date)    Pulse Oximetry BMI (Body Mass Index)                99% 34.49 kg/m2          MyChart Information     [employer] gives you secure access to your electronic health record. If you see a primary care provider, you can also send messages to your care team and make appointments. If you have questions, please call your primary care clinic.  If you do not have a primary care provider, please call [phone] and they will assist  you.        Care EveryWhere ID     This is your Care EveryWhere ID. This could be used by other organizations to access your [facility] medical records  [id number]        Equal Access to Services     DOUG VALLES : Bala Russo, walester kelleyushaha, jeniffer kahilaryda edilberto, anjana yasirin hayaasangeeta moyermarbella michaels martin dennis. So [facility] [phone].    ATENCIÓN: Si habla español, tiene a hines disposición servicios gratuitos de asistencia lingüística. Llame al [phone].    We comply with applicable federal civil rights laws and [place] laws. We do not discriminate on the basis of race, color, national origin, age, disability, sex, sexual orientation, or gender identity.               Review of your medicines      START taking        Dose / Directions    acetaminophen 325 MG tablet   Commonly known as:  TYLENOL        Dose:  650 mg   Take 2 tablets (650 mg) by mouth every 4 hours as needed for pain   Quantity:  100 tablet   Refills:  0       ferrous gluconate 324 (38 Fe) MG tablet   Commonly known as:  FERGON        Dose:  324 mg   Take 1 tablet (324 mg) by mouth daily (with breakfast)   Quantity:  100 tablet   Refills:  0       ibuprofen 600 MG tablet   Commonly known as:  ADVIL/MOTRIN        Dose:  600 mg   Take 1 tablet (600 mg) by mouth every 6 hours as needed for pain   Quantity:  120 tablet   Refills:  0       senna-docusate 8.6-50 MG per tablet   Commonly known as:  SENOKOT-S;PERICOLACE        Dose:  1 tablet   Take 1 tablet by mouth 2 times daily as needed for constipation   Quantity:  60 tablet   Refills:  0         CONTINUE these medicines which have NOT CHANGED        Dose / Directions    prenatal multivitamin plus iron 27-0.8 MG Tabs per tablet   Used for:  Amenorrhea        Dose:  1 tablet   Take 1 tablet by mouth daily   Quantity:  1 tablet   Refills:  0            Where to get your medicines      These medications were sent to [facility] [place], [place] - [address]  [address], [facility] [address]     Phone:  [phone]     acetaminophen 325 MG tablet    ferrous gluconate 324 (38 Fe) MG tablet    ibuprofen 600 MG tablet    senna-docusate 8.6-50 MG per tablet                Protect others around you: Learn how to safely use, store and throw away your medicines at www.disposemymeds.org.        Information about your nerve block     Today you received a block to numb the nerves near your surgery site.    This is a block using local anesthetic or \"numbing\" medication injected around the nerves to anesthetize or \"numb\" the area supplied by those nerves. This block is injected into the muscle layer near your surgical site. The type of anesthesia (Exparel) your anesthesia team used to numb your abdomen may give you relief for up to 72 hours.     Diet: There are no diet restrictions, but you should drink plenty of fluids, unless you are on a fluid-restricted diet.     Activity: If your surgical site is an arm or leg you should be careful with your affected limb, since it is possible to injure your limb without being aware of it due to the numbing. Until full feeling returns, you should guard against bumping or hitting your limb, and avoid extreme hot or cold temperatures on the skin.    Pain Medication: As the block wears off, the feeling will return as a tingling or prickly sensation near your surgical site. You will experience more discomfort from your incisions as the feeling returns. You may want to take a pain pill (a narcotic or Tylenol if this was prescribed by your surgeon) when you start to experience mild pain, before the pain becomes more severe. If your pain medications do not control your pain, you should notify your surgeon. If you are taking narcotics for pain management, do not drink alcohol, drive a car, or perform hazardous activities.  If you have questions or concerns you may call your surgeon at the number provided with your discharge instructions. "     Call your surgeon if you experience blurry vision, ringing in the ears or metallic taste in your mouth.              Medication List: This is a list of all your medications and when to take them. Check marks below indicate your daily home schedule. Keep this list as a reference.      Medications           Morning Afternoon Evening Bedtime As Needed    acetaminophen 325 MG tablet   Commonly known as:  TYLENOL   Take 2 tablets (650 mg) by mouth every 4 hours as needed for pain   Last time this was given:  975 mg on 7/26/2018  6:33 AM                                ferrous gluconate 324 (38 Fe) MG tablet   Commonly known as:  FERGON   Take 1 tablet (324 mg) by mouth daily (with breakfast)                                ibuprofen 600 MG tablet   Commonly known as:  ADVIL/MOTRIN   Take 1 tablet (600 mg) by mouth every 6 hours as needed for pain   Last time this was given:  800 mg on 7/26/2018  6:33 AM                                prenatal multivitamin plus iron 27-0.8 MG Tabs per tablet   Take 1 tablet by mouth daily   Last time this was given:  1 tablet on 7/23/2018  7:35 AM                                senna-docusate 8.6-50 MG per tablet   Commonly known as:  SENOKOT-S;PERICOLACE   Take 1 tablet by mouth 2 times daily as needed for constipation   Last time this was given:  2 tablets on 7/25/2018  9:51 AM

## 2018-06-30 NOTE — PLAN OF CARE
Problem: Patient Care Overview  Goal: Plan of Care/Patient Progress Review  Outcome: Improving  Patient transferred to antepartum rm 420 at 1650. Report received from Liza Deyr RN. Patient denies LOF, contractions, or any bleeding at this time. +FM. Plan to monitor bleeding and weigh pads if necessary. Continuous FM at this time for further observation.  at bedside for support.

## 2018-06-30 NOTE — PROGRESS NOTES
Transfer summary     at 33 weeks at a history of placenta previa but no placental accretion comes to triage with complaints of bleeding that started at 3 AM or 8 hours prior to presentation.  She had a dollar coin size clot at 3 AM when she got up to go urinate.  Ever since then she has felt a bright red trickle of blood.  No contractions.  Good fetal movement.  Last ultrasound reviewed and showed the placenta was moving away from the OS but still was marginal.  She had a full course of betamethasone 8 and 9 days ago.    LMP 2017 (Exact Date)   Vitals reviewed and normal  Well-appearing.  Heart regular.  Lungs clear.   exam shows a closed cervix with moderate amount of blood and clot material in the vault.  No obvious active bleeding.    Assessment and plan: 33 week or with vaginal bleeding and history of previa.  No signs of labor.  Discussed the case with [facility] and they graciously agree to transfer.  Will be sent by ground.  Stable at this time.  2 large-bore IVs placed.    Dex Charles MD

## 2018-06-30 NOTE — PROGRESS NOTES
S:  Discharge from triage.   A:  FHT's 145, Moderate variability, Accels present, no decels noted. Contractions none.  Cervical exam per speculum by Dr. Charles  R:   Left floor via cart with [facility], at 1150, going to [facility]. Rreport called to nurse there. No active bleeding at this time, pt has been mostly supine since admit. NPO      Nursing Discharge Checklist  Discharge order entered: Yes  Patient care order entered: No  Charges entered: Yes  IV start and stop times have been documented in Epic? Yes  NST start and stop times have been documented in [facility] Doc F/S? No    Discharged from floor with [facility] team, via cart, at 1150. To [facility]

## 2018-06-30 NOTE — DISCHARGE SUMMARY
[facility] Discharge Summary    Enid Cisneros MRN# [id number]   Age: 38 year old YOB: 1980     Date of Admission:  2018  Date of Discharge::  2019  Admitting Physician:  Paulo Pena MD  Discharge Physician:  Charito Lynn MD             Admission Diagnoses:   Intrauterine pregnancy at 33w2d  Anterior placenta previa  Vaginal bleeding  History of PACs, PVCs          Discharge Diagnosis:   Postpartum s/p PLTCS, otherwise same as above  Acute blood loss anemia secondary to surgical blood loss, expected          Procedures:   Procedure(s): Primary low transverse  section with double layer closure via Pfannenstiel skin incision  TAP block                Medications Prior to Admission:     Prescriptions Prior to Admission   Medication Sig Dispense Refill Last Dose     Prenatal Vit-Fe Fumarate-FA (PRENATAL MULTIVITAMIN PLUS IRON) 27-0.8 MG TABS per tablet Take 1 tablet by mouth daily 1 tablet 0 2018 at 1200             Discharge Medications:        Review of your medicines      START taking       Dose / Directions    acetaminophen 325 MG tablet   Commonly known as:  TYLENOL        Dose:  650 mg   Take 2 tablets (650 mg) by mouth every 4 hours as needed for pain   Quantity:  100 tablet   Refills:  0       ferrous gluconate 324 (38 Fe) MG tablet   Commonly known as:  FERGON        Dose:  324 mg   Take 1 tablet (324 mg) by mouth daily (with breakfast)   Quantity:  100 tablet   Refills:  0       ibuprofen 600 MG tablet   Commonly known as:  ADVIL/MOTRIN        Dose:  600 mg   Take 1 tablet (600 mg) by mouth every 6 hours as needed for pain   Quantity:  120 tablet   Refills:  0       senna-docusate 8.6-50 MG per tablet   Commonly known as:  SENOKOT-S;PERICOLACE        Dose:  1 tablet   Take 1 tablet by mouth 2 times daily as needed for constipation   Quantity:  60 tablet   Refills:  0         CONTINUE these medicines which have NOT CHANGED       Dose / Directions    prenatal  multivitamin plus iron 27-0.8 MG Tabs per tablet   Used for:  Amenorrhea        Dose:  1 tablet   Take 1 tablet by mouth daily   Quantity:  1 tablet   Refills:  0            Where to get your medicines      These medications were sent to [facility] [place], [place] - [address]  [address] [facility] [address], [facility] [address]     Phone:  [phone]      acetaminophen 325 MG tablet     ferrous gluconate 324 (38 Fe) MG tablet     ibuprofen 600 MG tablet     senna-docusate 8.6-50 MG per tablet                   Consultations:     NICU  Social work  Lactation          Brief Admission History:     Enid Cisneros is a 38 year old  at 33w2d by 22w6d US with known placenta previa admitted to the antepartum service for her second episode of vaginal bleeding. She was most recently admitted to [facility] from - for her first episode of bleeding, during which time she received two doses of betamethasone. Her bleeding completely resolved overnight during that admission, fetal status was reassuring, and she was discharged to home with strict return precautions.      When Ms. Cisneros got up to use the restroom overnight at 0300, she noticed the toilet was filled with blood. She kept wiping with toilet paper, and the bleeding seemed to be improving. She then checked her bleeding every half hour for the next several hours. She would have bleeding with wiping, but it seemed to be slowing down over time. At 1030, she passed a quarter size blood clot, called her local hospital, and went in for evaluation. She was evaluated at [facility] with a sterile speculum exam that showed a moderate amount of blood and clot in the vault and a visually closed cervix. Fetal heart tracing was appropriate for gestational age. Given her gestational age and second bleed in the context of placenta previa, she was transferred to [facility] for further management. Since the speculum exam, Ms. Cisneros has not had continued  bleeding. She has not had to wear a menstrual pad.     Ms. Cisneros has otherwise been feeling well. She had mild lower abdominal cramping that has since resolved and reports good fetal movement. She denies regular contractions or loss of fluid. She denies fever, chills, chest pain, shortness of breath, nausea, vomiting, headache, changes in vision, and dysuria.      OB Problem List:   1. IUP at 33w2d   2. Placenta previa. Two episodes of vaginal bleeding. Hospitalized from - for observation with resolution of bleeding. S/p 2 doses betamethasone.        Antepartum course     Ms. Cisneros was admitted to labor and delivery for continuous fetal monitoring and monitoring of vaginal bleeding. An EKG was obtained given her history of PVCs and PACs in , which was normal.  She was stable and had not significant bleeding through the time of her follow-up ultrasound on 18.  This ultrasound showed marginal placenta previa and  section was scheduled. She was type and crossed x2 units and received type and screen q72h during her admission. Patient remained stable until HD#24 when she developed bright red vaginal bleeding soaking a pad. This represented her third bleed, therefore delivery was recommended.          Intrapartum/Intraoperative course   The procedure was complicated by QBL 2628 mL, although a significant amount of pre-procedure vaginal bleeding was included in the QBL.    Operative findings: Single viable male infant at 15:59 hours on 2018  Apgars of 7/5/6/6. Birth weight 3300g.  Fetal presentation: Oblique breech.   Amniotic fluid: clear.  Placenta intact with 3 vessel cord.  Normal appearing uterus, fallopian tubes, ovaries.  No intraabdominal adhesions.  No abdominal wall adhesions    Please see operative report for full details.       Postpartum Course   The patient's hospital course was unremarkable.  She recovered as anticipated and experienced no post-operative complications. On  discharge, her pain was well controlled. Vaginal bleeding was appropriate for postpartum period.  Voiding without difficulty.  Ambulating well and tolerating a normal diet without nausea or emesis.  No fever or significant wound drainage.  Pumping for baby.  Infant is stable in NICU.  Passing flatus.    She was discharged on post-partum day #3.    Her partner is planning on vasectomy for birth control.    O Positive, Rhogam not indicated.     Post-partum hemoglobin: 9.1          Discharge Instructions and Follow-Up:   Discharge diet: Regular   Discharge activity: No lifting greater than 20 lbs, pushing, pulling, or other strenuous activity for 6 weeks. Pelvic rest for 6 weeks including no sexual intercourse, tampons, or douching. No driving until you can slam on the brakes without pain or while on narcotic pain medications.    Discharge follow-up: Follow up with primary OB for routine postpartum visit in 6 weeks   Wound care: Keep incision clean and dry           Discharge Disposition:   Discharged to home    # Pain Assessment:  Current Pain Score 2018   Patient currently in pain? denies   Pain score (0-10) -   Pain location -   Pain descriptors -   - Enid is experiencing pain due to recent  delivery. Pain management was discussed and the plan was created in a collaborative fashion.  Enid's response to the current recommendations: engaged  - Pharmacologic adjuvants: NSAIDs and Acetaminophen  - Non-pharmacologic adjuvants: Heat and Ice            Rosalina Duggan MD  OB/GYN, PGY3  18     Staff MD Note    I evaluated the patient on the day of discharge.  We reviewed discharge instructions.  Patient stable for discharge.    Charito Lynn MD

## 2018-06-30 NOTE — PLAN OF CARE
Enid Cisneros arrived via ambulance from [facility] for evaluation and treatment of bleeding with placenta previa. Her  [facility] arrived shortly after patient arrival. Prenatal records and transferring facility records sent with patient.  /60  Temp 98.4  F (36.9  C) (Oral)  Resp 18  LMP 11/23/2017 (Exact Date).      FHT: 145  NST: Accelerations: Present            Decelerations:None            NST: Reactive .  Uterine Assessment: Quiet  Bleeding: on toilet tissue with wipe, pad is clear.    IV: 18g x2, LR infusing x1 and saline lock x1 from transferring facility..    Dr Duggan notified of arrival & condition.  Oriented patient to surroundings. Call light within reach.     Plan:   -uterine/fetal monitoring continuous for now  -betamethasone Previously done on 6/20 and 6/21  -/ consults prn  -NNP consult ordered

## 2018-06-30 NOTE — PROGRESS NOTES
S: Triage Arrival  B: Enid is a 38 y.o.  @ 33w 2d who presents with complaint of vag bleeding with hx of placenta previa  A: EFM applied. FHT's155 with moderate variability, no decels noted on strip. Contractions none   R: Dr. Charles on floor, has talked to [facility]. IV started,plan transfer

## 2018-06-30 NOTE — IP AVS SNAPSHOT
[facility]    [address] [address]    Phone:  [phone]                                       After Visit Summary   6/30/2018    Enid Cisneros    MRN: [id number]           After Visit Summary Signature Page     I have received my discharge instructions, and my questions have been answered. I have discussed any challenges I see with this plan with the nurse or doctor.    ..........................................................................................................................................  Patient/Patient Representative Signature      ..........................................................................................................................................  Patient Representative Print Name and Relationship to Patient    ..................................................               ................................................  Date                                            Time    ..........................................................................................................................................  Reviewed by Signature/Title    ...................................................              ..............................................  Date                                                            Time

## 2018-07-01 PROCEDURE — 25000132 ZZH RX MED GY IP 250 OP 250 PS 637: Performed by: INTERNAL MEDICINE

## 2018-07-01 PROCEDURE — 12000030 ZZH R&B OB INTERMEDIATE UMMC

## 2018-07-01 RX ADMIN — PRENATAL VIT W/ FE FUMARATE-FA TAB 27-0.8 MG 1 TABLET: 27-0.8 TAB at 09:40

## 2018-07-01 NOTE — PROGRESS NOTES
MFM Antepartum Progress Note    Subjective: Feeling well. Dime sized amount of bleeding on pad last night, no bleeding since then. Denies contractions or abnormal vaginal discharge. Adequate fetal movement.     Objective:  Vitals:    18 2144 18 2345 18 0437 18 0800   BP: 109/52 94/51 95/52 97/56   Pulse:    78   Resp:   18 16   Temp:  98.6  F (37  C)  98.2  F (36.8  C)   TempSrc:  Oral  Oral     Gen: Resting comfortably in bed, NAD  CV: extremities warm and well perfused  Resp: Breathing comfortably on room air  Abd: Gravid, non-tender, non-distended  Ext: non-tender, no edema     FHT: , mod variability,+ accels, no decels  [place]: occasional uterine irritability    Assessment: Enid Cisneros is a 38 year old  @ 33w3d by 22w6d, admitted for second bleed in the setting of placenta previa.    Plan:    #Placenta Previa, 2nd bleed  - Last US  revealed an anterior lower placenta margin adjacent to the internal os, 2032g 66%tile, transverse with head to maternal right, 3VC, JEWEL 15.9, MVP 6.4cm  - s/p Beta -  - type and crossed x2, type and screen Q72h  - CS consent signed  - Plan to remain inpatient until delivery   - Repeat ultrasound to evaluate for placental position on   - No digital cervical exams  - Reg diet, make NPO if further large bleed  - Hgb 11.4 on admission    #Maternal hx of PACs/PVCs  - EKG nml   - s/p 48h Holter monitor in   - no current sx    #FWB:  - Fetal head maternal L, transverse on scan   - Cat I FHT, reactive  - Continue TID monitoring with NST  - NICU consult     -PNC: Rh+/RI    -Dispo: Inpatient until delivery    Seen with Dr. Becky Bragg MD  2018 9:43 AM     Physician Attestation   I, Paulo Pena, saw this patient with the resident and agree with the resident and/or medical student's findings and plan of care as documented in the note.      I personally reviewed vital signs, medications, labs, imaging and  EFM.    Key findings: In summary, Enid Cisneros is a  at 33w3d admitted with second episode of bleeding related to know placenta previa.  Bleeding has now stopped and both maternal and fetal status are stable and reassuring.  Plan continued inpatient expectant management at this time.    Paulo Pena MD  Date of Service (when I saw the patient): 18    Time Spent on this Encounter   I, Paulo Pena, spent a total of 15 minutes bedside and on the inpatient unit today managing the care of Enid Cisneros.  Over 50% of my time on the unit was spent counseling the patient and /or coordinating care regarding pregnancy complicated by vaginal bleeding presumed related to known placenta previa. See note for details.    Paulo Pena

## 2018-07-01 NOTE — PROVIDER NOTIFICATION
06/30/18 1930   Provider Notification   Provider Name/Title Dr. Duggan   Method of Notification In Department   Notification Reason Other (Comment)   Pt requesting AC IV to be removed. Ok to remove at this time with understanding that 2nd IV needs to be placed if pt starts bleeding again.

## 2018-07-01 NOTE — PLAN OF CARE
Problem: Bleeding, Second or Third Trimester (Adult,Obstetrics,Pediatric)  Goal: Signs and Symptoms of Listed Potential Problems Will be Absent, Minimized or Managed (Bleeding, Second or Third Trimester)  Signs and symptoms of listed potential problems will be absent, minimized or managed by discharge/transition of care (reference Bleeding, Second or Third Trimester (Adult,Obstetrics,Pediatric) CPG).   Outcome: Therapy, progress toward functional goals as expected  Data: Maternal status stable. Pt reports dime size spot of blood on pad when first up to the toilet this morning, none since. Pt denies contractions. Fetal assessment is reactive, cat 1.   Action: Continue with plan of care, which is close monitoring. Patient encouraged to move extremities while in bed.  Response: Patient coping with support of family at bedside.

## 2018-07-01 NOTE — CONSULTS
Neonatology Antepartum Counseling Consult      I was asked to provide antepartum counseling for Enid Cisneros at the request of Paulo Pena MD secondary to placenta previa with bleeding.  Ms. Cisneros is currently 33 2/7 weeks and has a hx significant for vaginal bleeding with a previa. Betamethasone was administered on -. Ms. Cisneros, accompanied by her , Didier, was counseled on the expected hospital course, potential risks, and outcomes associated with an infant born at approximately 33-34 weeks gestation. The counseling included: morbidity, mortality, initial delivery room stabilization, respiratory course, lung development, patent ductus arteriosus, retinopathy of prematurity, hyperbilirubinemia, hemodynamic support, infection (including NEC), intraventricular hemorrhage, nutrition, growth and development, and long term outcomes. Please feel free to call with any additional questions or concerns.      Nolvia Chaudhari PA-C 2018 8:54 PM   Advanced Practice Providers  [facility]    Floor Time (min): 5  Face to Face Time (min): 10  Total Time (minutes): 15  More than 50% of my time was spent in direct, face to face, antepartum counseling with the above patient.

## 2018-07-01 NOTE — PLAN OF CARE
Problem: Patient Care Overview  Goal: Plan of Care/Patient Progress Review  Outcome: Improving  VSS. Afebrile. Patient has not had any bleeding this shift. Denies pain, contractions and LOF. FHR monitored q4hrs, moderate variability with accels, no decels. Some irritability noted on toco, with contractions x2. Provider aware. Slept between cares. PIV saline locked. Continue to monitor.

## 2018-07-01 NOTE — PLAN OF CARE
Problem: Patient Care Overview  Goal: Plan of Care/Patient Progress Review  Outcome: No Change  VSS. No reports of bleeding. Q4 hour fetal and uterine monitoring. NNP at bedside for NICU consult. AM plan of care discussed. Patient reports no questions at this time. Will continue to monitor and update provider as necessary.

## 2018-07-01 NOTE — PROVIDER NOTIFICATION
06/30/18 1920   Provider Notification   Provider Name/Title Dr. Duggan   Method of Notification In Department   Notification Reason Status Update   Orders placed for 1 hr monitoring q4hrs.

## 2018-07-02 LAB — INTERPRETATION ECG - MUSE: NORMAL

## 2018-07-02 PROCEDURE — 12000030 ZZH R&B OB INTERMEDIATE UMMC

## 2018-07-02 PROCEDURE — 25000132 ZZH RX MED GY IP 250 OP 250 PS 637: Performed by: INTERNAL MEDICINE

## 2018-07-02 RX ADMIN — PRENATAL VIT W/ FE FUMARATE-FA TAB 27-0.8 MG 1 TABLET: 27-0.8 TAB at 07:52

## 2018-07-02 NOTE — PLAN OF CARE
Problem: Patient Care Overview  Goal: Plan of Care/Patient Progress Review  Outcome: Improving  VSS. Afebrile. Patient has not had any bleeding this shift. Denies pain, gordo and LOF. Patient states she slept well overnight and denies needs throughout shift. Morning EFM in process, see flowsheet. Saline lock in place, flushed without difficulty. Continue to monitor.

## 2018-07-02 NOTE — PROGRESS NOTES
Strip Review:  Baseline 135, moderate variability, + acels, no decels  No contractions per toco.    Griselda Monroy MD PhD  Ob/Gyn PGY-3  7/2/2018 6:48 AM

## 2018-07-02 NOTE — PLAN OF CARE
Problem: Patient Care Overview  Goal: Plan of Care/Patient Progress Review  Outcome: Improving  VSS. Afebrile. Denies LOF, feeling of ctx or pain. States she has had a small amount of blood on tissue with wipe earlier today, which is not new for her. Pt took a shower this evening, linens changed. FHT category 1 (see flowsheet). Continue with plan of care.

## 2018-07-02 NOTE — PROGRESS NOTES
MFM Antepartum Progress Note    Subjective: Feeling well. Dime sized amount of bleeding this morning. Denies contractions or abdominal pain.  No calf tenderness or swelling.  Baby is active.    Objective:  Vitals:    18 1600 18 1920 18 0015 18 0600   BP: 115/55 109/56 103/56 95/54   Pulse:       Resp:  16 16   Temp: 98.6  F (37  C) 98.3  F (36.8  C) 98.4  F (36.9  C) 98.5  F (36.9  C)   TempSrc: Oral Oral Oral Oral     Gen: Resting comfortably in bed, NAD  CV: extremities warm and well perfused  Resp: Breathing comfortably on room air  Abd: Gravid, non-tender, non-distended  Ext: non-tender, no edema     FHT: baseline 135, mod variability, accelerations present, no decelerations  [place]: no contractions or irritability    Assessment: Enid Cisneros is a 38 year old  @ 33w4d by 22w6d, admitted for second bleed in the setting of placenta previa.    Plan:    #Placenta Previa, 2nd bleed  - Last US  revealed an anterior lower placenta margin adjacent to the internal os, 2032g 66%tile, transverse with head to maternal right, 3VC, JEWEL 15.9, MVP 6.4cm  - s/p Beta -  - type and crossed x2, type and screen Q72h  - CS consent signed  - Plan to remain inpatient until delivery   - Repeat ultrasound to evaluate for placental position on   - No digital cervical exams  - Reg diet, make NPO if further large bleed  - Hgb 11.4 on admission    #Maternal hx of PACs/PVCs  - EKG nml   - s/p 48h Holter monitor in   - currently asymptomatic, no medication    #Fetal wellbeing:  - Fetal head maternal L, transverse on scan   - reactive NST  - Continue TID monitoring with NST  - NICU consult     -PNC: Rh+/RI    -Dispo: Inpatient until delivery unless there is significant migration of placenta    Seen with Dr. Becky Campos MD  PGY-3 OB/GYN  [phone] (OB G3 Pager)  9:36 AM    Physician Attestation   IPaulo, saw this patient with the resident and agree with the  resident and/or medical student's findings and plan of care as documented in the note.      I personally reviewed vital signs, medications, labs, imaging and EFM.    Key findings: In summary, Enid Cisneros is a  at 33w4d admitted with second episode of vaginal bleeding in context of know placenta previa.  Maternal and fetal status currently stable and will continue inpatient expectant management at this time.    Paulo Pena MD  Date of Service (when I saw the patient): 18    Time Spent on this Encounter   I, Paulo Pena, spent a total of 15 minutes bedside and on the inpatient unit today managing the care of Enid Cisneros.  Over 50% of my time on the unit was spent counseling the patient and /or coordinating care regarding vaginal bleeding in context of know placenta previa. See note for details.    Paulo Pena

## 2018-07-02 NOTE — PLAN OF CARE
Problem: Bleeding, Second or Third Trimester (Adult,Obstetrics,Pediatric)  Goal: Signs and Symptoms of Listed Potential Problems Will be Absent, Minimized or Managed (Bleeding, Second or Third Trimester)  Signs and symptoms of listed potential problems will be absent, minimized or managed by discharge/transition of care (reference Bleeding, Second or Third Trimester (Adult,Obstetrics,Pediatric) CPG).   Outcome: Improving  Minimal bleeding today, wiping after void. Denies pain, contractions, discomfort. VSS, reactive strip, active baby. Difficult to engage in conversation tho appears more at ease at this time with  and girls here. Will call with needs. To/from kitchen for water. Cont AP cares.

## 2018-07-03 LAB
ABO + RH BLD: NORMAL
ABO + RH BLD: NORMAL
BLD GP AB SCN SERPL QL: NORMAL
BLOOD BANK CMNT PATIENT-IMP: NORMAL
SPECIMEN EXP DATE BLD: NORMAL

## 2018-07-03 PROCEDURE — 25000132 ZZH RX MED GY IP 250 OP 250 PS 637: Performed by: INTERNAL MEDICINE

## 2018-07-03 PROCEDURE — 86850 RBC ANTIBODY SCREEN: CPT | Performed by: STUDENT IN AN ORGANIZED HEALTH CARE EDUCATION/TRAINING PROGRAM

## 2018-07-03 PROCEDURE — 86900 BLOOD TYPING SEROLOGIC ABO: CPT | Performed by: STUDENT IN AN ORGANIZED HEALTH CARE EDUCATION/TRAINING PROGRAM

## 2018-07-03 PROCEDURE — 36415 COLL VENOUS BLD VENIPUNCTURE: CPT | Performed by: STUDENT IN AN ORGANIZED HEALTH CARE EDUCATION/TRAINING PROGRAM

## 2018-07-03 PROCEDURE — 12000030 ZZH R&B OB INTERMEDIATE UMMC

## 2018-07-03 PROCEDURE — 86901 BLOOD TYPING SEROLOGIC RH(D): CPT | Performed by: STUDENT IN AN ORGANIZED HEALTH CARE EDUCATION/TRAINING PROGRAM

## 2018-07-03 RX ADMIN — PRENATAL VIT W/ FE FUMARATE-FA TAB 27-0.8 MG 1 TABLET: 27-0.8 TAB at 11:11

## 2018-07-03 NOTE — PROGRESS NOTES
Strip Review    Baseline 130, moderate variability, + acels, no decels  No contractions per toco    Griselda Monroy MD PhD  Ob/Gyn PGY-3  7/3/2018 6:51 AM

## 2018-07-03 NOTE — PROGRESS NOTES
"SPIRITUAL HEALTH SERVICES  SPIRITUAL ASSESSMENT Progress Note  [facility] ([facility]) PSCU Antepartum     PRIMARY FOCUS:     Support for coping    REFERRAL SOURCE: -initiated    ILLNESS CIRCUMSTANCES:   Reflective conversation shared with pt Enid which integrated elements of pregnancy and family narratives.     Context of Serious Illness/Symptom(s) - Enid is in for bleeding during pregnancy, mostly confined to her bed.    Resources for Support - Family, friends. Enid really appreciated a gift basket provided by other mothers who have spent time on PSCU, said that it was comforting to be supported by people she hasn't even met.    DISTRESS:     Emotional/Spiritual/Existential Distress - Not discussed    Mandaeism Distress - Not discussed    Social/Cultural/Economic Distress - Enid shared that the hardest part of this hospitalization is being away from her kids, and she expects that it will be harder when her youngest (3 years) starts missing her too.    SPIRITUAL/Gnosticist COPING:     Anabaptism/Tina - Latter-day    Spiritual Practice(s) - Prayer is important to Enid, \"I pray every day, asking Him just to be with us.\" She and her family are looking for a Evangelical that meets all their needs, which she identified as being fun for the kids, fostering her personal relationship with God, and providing a \"family feeling.\"    Emotional/Relational/Existential Connections - Enid takes comfort in the idea that \"it's all God's plan\" and that she doesn't have to worry because she can just \"give it to God.\" She indicated that she knows this might change after being in the hospital for a longer period of time, but she is not very worried right now: \"It is what it is.\" At the moment she is appreciating having so much free time to do what she wants to do.    GOALS OF CARE:    Goals of Care - Enid just hopes to stay healthy until her baby boy (no name yet) is born.    Meaning/Sense-Making - Enid finds comfort in being able to " trust that God has a plan, alleviating her worries or need to be in control.    PLAN: Will follow-up 1x/week for duration of hospitalization.    Pattie Brink   Intern  Pager [phone]

## 2018-07-03 NOTE — PROGRESS NOTES
Received order for SW to see for antepartum assessment.      Attempted visit with patient.  She is visiting with the  intern.  Left Enid with my contact information.      Will attempt visit again after the holiday- Thursday July 5th.

## 2018-07-03 NOTE — PLAN OF CARE
Problem: Bleeding, Second or Third Trimester (Adult,Obstetrics,Pediatric)  Goal: Signs and Symptoms of Listed Potential Problems Will be Absent, Minimized or Managed (Bleeding, Second or Third Trimester)  Signs and symptoms of listed potential problems will be absent, minimized or managed by discharge/transition of care (reference Bleeding, Second or Third Trimester (Adult,Obstetrics,Pediatric) CPG).   Outcome: Improving  Pt VSS. BP 98/55  Pulse 85  Temp 98.4  F (36.9  C) (Oral)  Resp 16  Wt 98.1 kg (216 lb 4.8 oz)  LMP 11/23/2017 (Exact Date)  BMI 33.88 kg/m2. Pt continues to deny LOF, bleeding or ctx at this time. Reports +Fetal movement. Denies vision changes/HA. FHR WDL. Moderate variability present. No decels noted at last observation. Writer was going to flush pt IV site, however, site was symptomatic. Site was discontinued. New site was placed. See flowsheet. Pt tolerated well. Denies pain at this time. Will continue to monitor and support.

## 2018-07-03 NOTE — PROGRESS NOTES
MFM Antepartum Progress Note    Subjective: Feeling well.  She denies any further active bleeding, but had some dark red discharge overnight. Denies contractions or abdominal pain.  No calf tenderness or swelling.  Baby is active.    Objective:  Vitals:    18 0001 18 0415 18 0547 18 0739   BP: 110/59 98/55  102/58   Pulse:       Resp:       Temp: 98.2  F (36.8  C) 98.4  F (36.9  C)     TempSrc: Oral Oral     Weight:   98.1 kg (216 lb 4.8 oz)      Gen: Resting comfortably in bed, NAD  CV: extremities warm and well perfused  Resp: Breathing comfortably on room air  Abd: Gravid, non-tender, non-distended  Ext: non-tender, no edema     FHT: baseline 135, mod variability, accelerations present, no decelerations  [place]: no contractions or irritability    Assessment: Enid Cisneros is a 38 year old  @ 33w5d by 22w6d, admitted for second bleed in the setting of placenta previa.    Plan:    #Placenta Previa, 2nd bleed  - Last US  revealed an anterior lower placenta margin adjacent to the internal os, 2032g 66%tile, transverse with head to maternal right, 3VC, JEWEL 15.9, MVP 6.4cm  - s/p Beta -  - type and crossed x2, type and screen Q72h  - CS consent signed  - Plan to remain inpatient until delivery   - Repeat ultrasound to evaluate for placental position on   - No digital cervical exams  - Reg diet, make NPO if further large bleed  - Hgb 11.4 on admission    #Maternal hx of PACs/PVCs  - EKG nml   - s/p 48h Holter monitor in   - currently asymptomatic, no medication    #Fetal wellbeing:  - Fetal head maternal L, transverse on scan   - reactive NST  - Continue TID monitoring with NST  - NICU consult     -PNC: Rh+/RI    -Dispo: Inpatient until delivery unless there is significant migration of placenta    Paulo Pena MD  Date of Service (when I saw the patient): 18    Time Spent on this Encounter   I, Paulo Pena, spent a total of 15 minutes bedside and on  the inpatient unit today managing the care of Enid Cisneros.  Over 50% of my time on the unit was spent counseling the patient and /or coordinating care regarding vaginal bleeding in context of know placenta previa. See note for details.    Paulo Pena

## 2018-07-03 NOTE — PLAN OF CARE
Problem: Patient Care Overview  Goal: Plan of Care/Patient Progress Review  Outcome: No Change  Patient remains stable this afternoon. VSS. Denies any pain, cramping, tenderness, leaking, bleeding. Patient doing a puzzle to stay busy, states most of her family has left for the 4th up to her family cabin. Patient denies any needs at this time. Continue with plan of care.

## 2018-07-03 NOTE — PLAN OF CARE
Problem: Patient Care Overview  Goal: Plan of Care/Patient Progress Review  Outcome: No Change  Patient has had no active bleeding during shift. Continue present cares.

## 2018-07-04 LAB
BLD PROD TYP BPU: NORMAL
BLD PROD TYP BPU: NORMAL
BLD UNIT ID BPU: 0
BLD UNIT ID BPU: 0
BLOOD PRODUCT CODE: NORMAL
BLOOD PRODUCT CODE: NORMAL
BPU ID: NORMAL
BPU ID: NORMAL
TRANSFUSION STATUS PATIENT QL: NORMAL

## 2018-07-04 PROCEDURE — 12000030 ZZH R&B OB INTERMEDIATE UMMC

## 2018-07-04 PROCEDURE — 25000132 ZZH RX MED GY IP 250 OP 250 PS 637: Performed by: INTERNAL MEDICINE

## 2018-07-04 RX ADMIN — PRENATAL VIT W/ FE FUMARATE-FA TAB 27-0.8 MG 1 TABLET: 27-0.8 TAB at 08:24

## 2018-07-04 NOTE — PROGRESS NOTES
MFM Antepartum Progress Note    Subjective: Feeling well.  She denies any bleeding or brown discharge.  She denies contractions or pressure.  She is feeling typical fetal movement.  She has been ambulating minimally and was encouraged to ambulate more.  She denies chest pain, leg pain, changes in swelling.    Objective:  Vitals:    18 0000 18 0400 18 0824 18 0826   BP: 99/56 110/58     Pulse:       Resp: 16 16  16   Temp: 98.3  F (36.8  C)  98  F (36.7  C)    TempSrc: Oral  Oral    Weight:         Gen: Resting comfortably in bed, NAD  CV: extremities warm and well perfused  Resp: Breathing comfortably on room air  Abd: Gravid, non-tender, non-distended  Ext: non-tender, no edema     FHT: baseline 135, moderate variability, accelerations present, no decelerations  [place]: no contractions or irritability    Assessment: Enid Cisneros is a 38 year old  @ 33w6d by 22w6d, admitted for second bleed in the setting of placenta previa.    Plan:    #Placenta Previa, 2nd bleed  - Last US  revealed an anterior lower placenta margin adjacent to the internal os, 2032g 66%tile, transverse with head to maternal right, 3VC, JEWEL 15.9, MVP 6.4cm  - s/p Beta -  - type and crossed x2, type and screen Q72h  - CS consent signed  - Plan to remain inpatient until delivery   - Repeat ultrasound to evaluate for placental position on   - No digital cervical exams  - Reg diet, make NPO if further large bleed  - Hgb 11.4 on admission    #Maternal hx of PACs/PVCs  - EKG nml   - s/p 48h Holter monitor in   - currently asymptomatic, no medication    #Fetal wellbeing:  - Fetal head maternal L, transverse on scan   - reactive NST  - Continue TID monitoring with NST  - NICU consult     -PNC: Rh+/RI    -Dispo: Inpatient until delivery unless there is significant migration of placenta    Mel Campos MD  PGY-3 OB/GYN  [phone] (OB G3 Pager)  8:30 AM  2018     Physician Attestation   I,  Paulo Pena, saw this patient with the resident and agree with the resident and/or medical student's findings and plan of care as documented in the note.      I personally reviewed vital signs, medications, labs, imaging and EFM.    Key findings: In summary, Enid Cisneros is a  at 33w6d admitted with second episode of vaginal bleeding in context of know placenta previa.  Maternal and fetal status is currently stable and reassuring.  Will plan continued inpatient management at this time.      Paulo Pena MD  Date of Service (when I saw the patient): 18    Time Spent on this Encounter   I, Paulo Pena, spent a total of 15 minutes bedside and on the inpatient unit today managing the care of Enid Cisneros.  Over 50% of my time on the unit was spent counseling the patient and /or coordinating care regarding pregnancy complicated by vaginal bleeding in context of know placenta previa. See note for details.    Paulo Pena

## 2018-07-04 NOTE — PROGRESS NOTES
Strip Review    Baseline 135, moderate variability, + acels, no decels    No contractions per toco.    Griselda Monroy MD PhD  Ob/Gyn PGY-3  7/4/2018 7:00 AM

## 2018-07-04 NOTE — PLAN OF CARE
Problem: Patient Care Overview  Goal: Plan of Care/Patient Progress Review  Outcome: No Change  Patient able to sleep during the night. VSS; afebrile; EFM as charted. Denies pain, contractions, bleeding, leaking fluid. Continue expectant management.

## 2018-07-04 NOTE — PLAN OF CARE
Problem: Patient Care Overview  Goal: Plan of Care/Patient Progress Review  Outcome: No Change  Patient has remained stable this shift. VSS. Denies cramping, contractions, bleeding, or spotting. Patient's family here today and [facility]. Gave patient a tie blanket that she has been working on this afternoon. FHT appropriate this afternoon, infant very active, see flow records. Denies any needs at this time. Continue with plan of care.

## 2018-07-05 PROCEDURE — 12000030 ZZH R&B OB INTERMEDIATE UMMC

## 2018-07-05 PROCEDURE — 25000132 ZZH RX MED GY IP 250 OP 250 PS 637: Performed by: INTERNAL MEDICINE

## 2018-07-05 RX ADMIN — PRENATAL VIT W/ FE FUMARATE-FA TAB 27-0.8 MG 1 TABLET: 27-0.8 TAB at 08:44

## 2018-07-05 NOTE — PROGRESS NOTES
MFM Antepartum Progress Note    Subjective: Feeling well.  Reports scant dark brown discharge but no bright red bleeding.  She denies contractions or pressure.  She is feeling typical fetal movement.  She has been ambulatory.   She denies chest pain, leg pain, changes in swelling.    Objective:  Vitals:    18 1511 18 1942 18 0002 18 0538   BP: 117/64 112/68 115/56 102/55   Pulse:       Resp:    Temp: 98.7  F (37.1  C) 98.5  F (36.9  C) 98.7  F (37.1  C) 98.5  F (36.9  C)   TempSrc: Oral Oral Oral Oral   Weight:         Gen: Resting comfortably in bed, NAD  CV: extremities warm and well perfused  Resp: Breathing comfortably on room air  Abd: Gravid, non-tender, non-distended  Ext: non-tender, no edema     FHT: baseline 125, moderate variability, accelerations present, no decelerations  [place]: no contractions or irritability    Assessment: Enid Cisneros is a 38 year old  @ 34w0d by 22w6d, admitted for second bleed in the setting of placenta previa.    Plan:    #Placenta Previa, 2nd bleed  - Last US  revealed an anterior lower placenta margin adjacent to the internal os, 2032g 66%tile, transverse with head to maternal right, 3VC, JEWEL 15.9, MVP 6.4cm  - s/p Beta -20  - type and crossed x2, type and screen Q72h ( next )   - CS consent signed  - Plan to remain inpatient until delivery   - Repeat ultrasound to evaluate for placental position on   - No digital cervical exams  - Reg diet, make NPO if further large bleed  - Hgb 11.4 on admission    #Maternal hx of PACs/PVCs  - EKG nml   - s/p 48h Holter monitor in   - currently asymptomatic, no medication    #Fetal wellbeing:  - Fetal head maternal L, transverse on scan   - reactive NST  - Continue TID monitoring with NST  - NICU consult completed     -PNC: Rh+/RI    -Dispo: Inpatient until delivery unless there is significant migration of placenta or persistent bleeding    Seen and examined with   Paulo Aaron  [facility] Fellow   8:45 AM  2018     Physician Attestation   I, Paulo Pena, saw this patient with the resident and agree with the resident and/or medical student's findings and plan of care as documented in the note.      I personally reviewed vital signs, medications, labs, imaging and EFM.    Key findings: In summary, Enid Cisneros is a  at 34w0d admitted with vaginal bleeding in context of know placenta previa.  Both maternal and fetal status are reassuring and stable.  Will continue close inpatient management at this time.    Paulo Pena MD  Date of Service (when I saw the patient): 18    Time Spent on this Encounter   I, Paulo Pena, spent a total of 15 minutes bedside and on the inpatient unit today managing the care of Enid Cisneros.  Over 50% of my time on the unit was spent counseling the patient and /or coordinating care regarding pregnancy complicated by vaginal bleeding in context of known placenta previa. See note for details.    Paulo Pena

## 2018-07-05 NOTE — PLAN OF CARE
Problem: Patient Care Overview  Goal: Plan of Care/Patient Progress Review  Outcome: No Change  VSS. FHT- category 1.  No bleeding or spotting. No contractions on toco.  Denies pain.

## 2018-07-05 NOTE — PROGRESS NOTES
"[facility]  MATERNAL CHILD HEALTH   SOCIAL WORK PROGRESS NOTE      DATA:   Received order for social work to see for antepartum psychosocial assessment.      Met with Enid this afternoon to assess needs and to offer support.  Enid is 38 years-old. She is  to her , Didier and their family lives in [place], [place].   They know they are expecting a baby boy but have not yet chosen a name. This is their 3rd baby;  Their girls at home are 7 year-old \"Ali\" and 3 year-old \"Lamar\".   Enid's mom is caring for the girls at home while she is hospitalized.   Support system is strong and available to help throughout Enid's PSCU admission.      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  She is able to work while here and on bedrest.  At this point, work is not stressful for her and she experiences it as a helpful distraction.  Didier is employed as a  for a grain elevator company.  He is back to work today and will continue to work while Enid's condition permits.      The family has BCBS health insurance through Didier's employer.  Baby will added to this policy upon birth.   Enid will bring baby to the [facility] to see [place] for primary care.  She kept baby supplies from her other children and has all essential items to bring new baby home.    Enid has history of anxiety disorder with panic attacks.  She explains that her symptoms were limited to the duration of time she used oral contraceptives.  She had one other isolated panic attack after the birth of her first baby.  She relates her anxiety to hormonal changes.  She briefly took Zoloft but discontinued this medication and she has since had a full resolution of anxiety symptoms.  She endorses stable mood now and throughout this pregnancy.      INTERVENTION:   Antepartum psychosocial assessment completed.   Provided supportive " counseling related to Enid's pregnancy complications and PSCU admission.   Provided education about pregnancy and postpartum mood and anxiety disorders.    Shared information about hospital parking and encouraged family to purchase a parking pass.      ASSESSMENT:   Enid is adjusting to her PSCU admission and is coping well.  She has found diversionary activities  (reading, puzzles, work) to help her pass the time.  She misses her girls but has comfort in knowing they are well-cared for by family.   No un-met needs or concerns identified.      PLAN:   Will continue to be available throughout Enid's pregnancy journey for needs and for support.

## 2018-07-05 NOTE — PROGRESS NOTES
Strip Review    Baseline 125, moderate variability, + acels, no decels  No contractions per toco    Griselda Monroy MD PhD  Ob/Gyn PGY-3  7/5/2018 6:38 AM

## 2018-07-06 LAB
ABO + RH BLD: NORMAL
ABO + RH BLD: NORMAL
ANION GAP SERPL CALCULATED.3IONS-SCNC: 10 MMOL/L (ref 3–14)
BLD GP AB SCN SERPL QL: NORMAL
BLOOD BANK CMNT PATIENT-IMP: NORMAL
BUN SERPL-MCNC: 10 MG/DL (ref 7–30)
CALCIUM SERPL-MCNC: 8.3 MG/DL (ref 8.5–10.1)
CHLORIDE SERPL-SCNC: 106 MMOL/L (ref 94–109)
CO2 SERPL-SCNC: 25 MMOL/L (ref 20–32)
CREAT SERPL-MCNC: 0.46 MG/DL (ref 0.52–1.04)
ERYTHROCYTE [DISTWIDTH] IN BLOOD BY AUTOMATED COUNT: 13 % (ref 10–15)
GFR SERPL CREATININE-BSD FRML MDRD: >90 ML/MIN/1.7M2
GLUCOSE BLDC GLUCOMTR-MCNC: 113 MG/DL (ref 70–99)
GLUCOSE SERPL-MCNC: 124 MG/DL (ref 70–99)
HCT VFR BLD AUTO: 34.5 % (ref 35–47)
HGB BLD-MCNC: 11.1 G/DL (ref 11.7–15.7)
MCH RBC QN AUTO: 29.4 PG (ref 26.5–33)
MCHC RBC AUTO-ENTMCNC: 32.2 G/DL (ref 31.5–36.5)
MCV RBC AUTO: 91 FL (ref 78–100)
PLATELET # BLD AUTO: 179 10E9/L (ref 150–450)
POTASSIUM SERPL-SCNC: 3.5 MMOL/L (ref 3.4–5.3)
RBC # BLD AUTO: 3.78 10E12/L (ref 3.8–5.2)
SODIUM SERPL-SCNC: 141 MMOL/L (ref 133–144)
SPECIMEN EXP DATE BLD: NORMAL
WBC # BLD AUTO: 9.6 10E9/L (ref 4–11)

## 2018-07-06 PROCEDURE — 00000146 ZZHCL STATISTIC GLUCOSE BY METER IP

## 2018-07-06 PROCEDURE — 86900 BLOOD TYPING SEROLOGIC ABO: CPT | Performed by: STUDENT IN AN ORGANIZED HEALTH CARE EDUCATION/TRAINING PROGRAM

## 2018-07-06 PROCEDURE — 80048 BASIC METABOLIC PNL TOTAL CA: CPT | Performed by: STUDENT IN AN ORGANIZED HEALTH CARE EDUCATION/TRAINING PROGRAM

## 2018-07-06 PROCEDURE — 86850 RBC ANTIBODY SCREEN: CPT | Performed by: STUDENT IN AN ORGANIZED HEALTH CARE EDUCATION/TRAINING PROGRAM

## 2018-07-06 PROCEDURE — 12000030 ZZH R&B OB INTERMEDIATE UMMC

## 2018-07-06 PROCEDURE — 85027 COMPLETE CBC AUTOMATED: CPT | Performed by: STUDENT IN AN ORGANIZED HEALTH CARE EDUCATION/TRAINING PROGRAM

## 2018-07-06 PROCEDURE — 93005 ELECTROCARDIOGRAM TRACING: CPT

## 2018-07-06 PROCEDURE — 36415 COLL VENOUS BLD VENIPUNCTURE: CPT | Performed by: STUDENT IN AN ORGANIZED HEALTH CARE EDUCATION/TRAINING PROGRAM

## 2018-07-06 PROCEDURE — 25000132 ZZH RX MED GY IP 250 OP 250 PS 637: Performed by: INTERNAL MEDICINE

## 2018-07-06 PROCEDURE — 86901 BLOOD TYPING SEROLOGIC RH(D): CPT | Performed by: STUDENT IN AN ORGANIZED HEALTH CARE EDUCATION/TRAINING PROGRAM

## 2018-07-06 RX ADMIN — PRENATAL VIT W/ FE FUMARATE-FA TAB 27-0.8 MG 1 TABLET: 27-0.8 TAB at 07:41

## 2018-07-06 NOTE — PLAN OF CARE
Problem: Patient Care Overview  Goal: Plan of Care/Patient Progress Review  Outcome: No Change  Data: Maternal status is stable.  VS WNL.  Patient having some lightheadedness and dizziness today (see previous note).  EKG, CBC, and BMP done.  Patient reports that lightheadedness and dizziness have gotten better.  She denies cramping, ctx, LOF, or vaginal bleeding.  Fetal assessment is AGA.  No ctx on toco.   Action: Continue with plan of care. Patient encouraged to move extremities while in bed.  Response: Patient coping well but sad this afternoon after her children left.  3 yo daughter having a difficult time without mother at home.

## 2018-07-06 NOTE — PROVIDER NOTIFICATION
07/06/18 1550   Provider Notification   Provider Name/Title Dr. Campos   Method of Notification Electronic Page   Notification Reason Other (Comment)     Provider notified pt IV infiltrated, requesting to have IV left out. Waiting for provider response.

## 2018-07-06 NOTE — PROGRESS NOTES
Strip Review    Basline 135, moderate variability, + acels, no decels  No contractions per toco    Griselda Monroy MD PhD  Ob/Gyn PGY-3  7/6/2018 6:53 AM

## 2018-07-06 NOTE — PROVIDER NOTIFICATION
07/06/18 1250   Provider Notification   Provider Name/Title Dr. Campos   Method of Notification In Department   Request Evaluate in Person   Notification Reason Other (Comment)  (pt reports a constant lightheaded and dizzy feeling)     Pt reports a lightheaded and dizzy feeling.  She has been eating and drinking well.  Presently eating lunch, .  States lightheaded feeling does not improve or worsen when she is in any particular position.  VSS.  MD notified.  Plan for EKG.

## 2018-07-06 NOTE — PROGRESS NOTES
MFM Antepartum Progress Note    Subjective: Feeling well.  Reports only tan colored discharge and no further bleeding.   She denies contractions or pressure.  She is feeling typical fetal movement.  She has been ambulatory.   She denies chest pain, leg pain, changes in swelling.    Objective:  Vitals:    18 0040 18 0551 18 0651 18 0736   BP: 93/54 (!) 85/47 97/51 101/55   Pulse:       Resp:    Temp: 98  F (36.7  C) 97.6  F (36.4  C)  97.5  F (36.4  C)   TempSrc: Oral Oral  Oral   Weight:         Gen: Resting comfortably in bed, NAD  CV: extremities warm and well perfused  Resp: Breathing comfortably on room air  Abd: Gravid, non-tender, non-distended  Ext: non-tender, no edema     FHT: baseline 135, moderate variability, accelerations present, no decelerations  [place]: no contractions or irritability    Assessment: Enid Cisneros is a 38 year old  @ 34w1d by 22w6d, admitted for second bleed in the setting of placenta previa.    Plan:    #Marginal Placenta Previa complicated with  2nd episode of  bleeding  - Last US  revealed an anterior lower placenta margin adjacent to the internal os, 2032g 66%tile, transverse with head to maternal right, 3VC, JEWEL 15.9, MVP 6.4cm  - s/p Beta -20  - type and crossed x2, type and screen Q72h ( next )   - CS consent signed  - Plan to remain inpatient until delivery if confirmed previa or persistent bleeding  - Repeat ultrasound to evaluate for placental position on   - No digital cervical exams  - Reg diet, make NPO if  bleeding  - Hgb 11.4 on admission    #Maternal hx of PACs/PVCs  - EKG nml   - s/p 48h Holter monitor in   - currently asymptomatic, no medication    #Fetal wellbeing:  - Fetal head maternal L, transverse on scan   - reactive NST  - Continue TID monitoring with NST  - NICU consult completed     -PNC: Rh+/RI    -Dispo: Inpatient until delivery unless there is significant migration of placenta on  follow up ultrasound on .   Delivery by c/s if she has a significant amount of bleeding.     Romie Aaron  M Fellow   10:54 AM  2018     Physician Attestation   I, Paulo Pena, saw this patient with the resident and agree with the resident and/or medical student's findings and plan of care as documented in the note.      I personally reviewed vital signs, medications, labs, imaging and EFM.    Key findings: In summary, Enid Cisneros is a  at 34w1d admitted with vaginal bleeding in context of know placenta previa.  Both maternal and fetal status continue to be reassuring.  Will plan repeat US on Monday to re-evaluate placental location.      Paulo Pena MD  Date of Service (when I saw the patient): 18    Time Spent on this Encounter   I, Paulo Pena, spent a total of 15 minutes bedside and on the inpatient unit today managing the care of Enid Cisneros.  Over 50% of my time on the unit was spent counseling the patient and /or coordinating care regarding pregnancy complicated by vaginal bleeding in context of known placenta previa. See note for details.    Paulo Pena

## 2018-07-06 NOTE — PLAN OF CARE
Problem: Patient Care Overview  Goal: Plan of Care/Patient Progress Review  Outcome: No Change  AFVSS. Denies contractions/cramping, backache, pelvic pressure, leaking or bleeding. Offers no complaints/concerns. Continue to monitor for s/s ptl/bleeding. Continue present cares.

## 2018-07-06 NOTE — PLAN OF CARE
Problem: Patient Care Overview  Goal: Plan of Care/Patient Progress Review  Outcome: No Change  D: AF, blood pressure low this am, denies dizziness, SOB or HA. Will reassess in 1 hour. Other VSS. Denies contractions/cramping, backache, pelvic pressure, leaking or bleeding.States scant brown/tan discharge on pad-which she states is usual for her. Offers no complaints/concerns. A: Appears stable at this time. P:  Continue to monitor for s/s ptl/bleeding. Continue present cares.

## 2018-07-07 PROCEDURE — 25000132 ZZH RX MED GY IP 250 OP 250 PS 637: Performed by: INTERNAL MEDICINE

## 2018-07-07 PROCEDURE — 12000028 ZZH R&B OB UMMC

## 2018-07-07 RX ADMIN — PRENATAL VIT W/ FE FUMARATE-FA TAB 27-0.8 MG 1 TABLET: 27-0.8 TAB at 08:46

## 2018-07-07 NOTE — PLAN OF CARE
Problem: Patient Care Overview  Goal: Plan of Care/Patient Progress Review  Outcome: No Change  VSS. Denies changes in bleeding, cramping, LOF, light headedness/dizziness, and headache. Fetal monitoring AGA. Patient tearful at times when talking about daughters at home.  Has distracting activities, but declines doing those this evening. IV replaced this shift due to previous IV infiltrating. Continue with current plan of care.

## 2018-07-07 NOTE — PROGRESS NOTES
MFM Antepartum Progress Note    Subjective: Feeling well. No additional bleeding. She denies contractions or pressure. Good fetal movement.     Objective:  Vitals:    18 1930 18 2320 18 0600 18 0845   BP: 109/55 113/54 93/52 114/58   Pulse:       Resp:    Temp: 97.8  F (36.6  C) 97.9  F (36.6  C)  97.8  F (36.6  C)   TempSrc: Oral Oral  Oral   Weight:         Gen: Resting comfortably in bed, NAD  CV: extremities warm and well perfused  Resp: Breathing comfortably on room air  Abd: Gravid, non-tender, non-distended  Ext: non-tender, no edema     FHT: baseline 130, moderate variability, accelerations present, no decelerations  [place]: no contractions or irritability    Assessment: Enid Cisneros is a 38 year old  @ 34w2d by 22w6d, admitted for second bleed in the setting of placenta previa.    Plan:    Marginal Placenta Previa complicated with 2nd episode of  bleeding  - Last US  revealed an anterior lower placenta margin adjacent to the internal os, 2032g 66%tile, transverse with head to maternal right,   JEWEL 15.9, MVP 6.4cm  - Repeat ultrasound to evaluate for placental position on   - s/p Beta -  - type and crossed x2, type and screen Q72h ( next )   - CS consent signed  - No digital cervical exams  - Reg diet, make NPO if  bleeding  - Hgb 11.4 on admission      Maternal hx of PACs/PVCs  - EKG nml ,   - s/p 48h Holter monitor in   - currently asymptomatic, no medication    Fetal wellbeing:  - Fetal head maternal L, transverse on scan   - reactive NST  - Continue TID monitoring with NST  - NICU consult completed     -PNC: Rh+/RI    -Dispo: Inpatient until delivery unless there is significant migration of placenta on follow up ultrasound on . Delivery by c/s if she has a significant amount of bleeding.     Salma Mcgarry MD, MHS  Ob/Gyn PGY3  9:54 AM  2018     Physician Attestation   I, Colin Daren Rauk, saw this patient with the  resident and agree with the resident and/or medical student's findings and plan of care as documented in the note.      I personally reviewed vital signs, examination, FHR tracing.    Key findings: AF, spot of blood only and NT uterus, category 1 tracing. Plan to assess placenta location on 7/9.    Colin Cordova MD  Date of Service (when I saw the patient): 07/07/18

## 2018-07-07 NOTE — PLAN OF CARE
Problem: Bleeding, Second or Third Trimester (Adult,Obstetrics,Pediatric)  Goal: Signs and Symptoms of Listed Potential Problems Will be Absent, Minimized or Managed (Bleeding, Second or Third Trimester)  Signs and symptoms of listed potential problems will be absent, minimized or managed by discharge/transition of care (reference Bleeding, Second or Third Trimester (Adult,Obstetrics,Pediatric) CPG).   Outcome: No Change  VSS. Afebrile. Pt denies vaginal bleeding, leaking of fluid, contractions or cramping. +FM. IV saline locked. See flowsheets for FHR and contraction patterns. Continue with plan of care.

## 2018-07-08 PROCEDURE — 25000132 ZZH RX MED GY IP 250 OP 250 PS 637: Performed by: INTERNAL MEDICINE

## 2018-07-08 PROCEDURE — 12000028 ZZH R&B OB UMMC

## 2018-07-08 RX ADMIN — PRENATAL VIT W/ FE FUMARATE-FA TAB 27-0.8 MG 1 TABLET: 27-0.8 TAB at 08:41

## 2018-07-08 NOTE — PLAN OF CARE
Problem: Patient Care Overview  Goal: Plan of Care/Patient Progress Review  Outcome: No Change  Data: Maternal status is stable.  VS WNL.  Patient denies cramping, ctx, LOF.  No vaginal bleeding today. Fetal assessment is AGA.   Action: Continue with plan of care. Patient encouraged to move extremities while in bed.  Family present today (patient's , children, and her parents).  Patient and family went on wheelchair ride to the playground.  Response: Patient coping well.

## 2018-07-08 NOTE — PLAN OF CARE
Problem: Bleeding, Second or Third Trimester (Adult,Obstetrics,Pediatric)  Goal: Signs and Symptoms of Listed Potential Problems Will be Absent, Minimized or Managed (Bleeding, Second or Third Trimester)  Signs and symptoms of listed potential problems will be absent, minimized or managed by discharge/transition of care (reference Bleeding, Second or Third Trimester (Adult,Obstetrics,Pediatric) CPG).   Outcome: No Change  Pt VSS. /54  Pulse 90  Temp 97.8  F (36.6  C) (Oral)  Resp 20  Wt 98.1 kg (216 lb 4.8 oz)  LMP 11/23/2017 (Exact Date)  BMI 33.88 kg/m2. Pt denies LOF. Pt does report spotting with dime-sized spots. However, states this is her baseline. No increased bleeding noted. Provider aware of this report as she was intra-department and writer notified ensure continuity of care. Pt denies ctx at this time. Pt reports active fetal movement. IV site SL and was flushed during shift. FHR WDL. Moderate variability noted this morning. No decels at time of observation noted. Pt denies pain during entire shift. Will continue to monitor and support.

## 2018-07-08 NOTE — PROGRESS NOTES
[facility] Antepartum Progress Note    Subjective: Patient feeling well.  Spotting yesterday but none overnight or this morning.. She denies contractions, cramoing or pressure. She reports regular fetal movement.   No CP or SOB. She is looking forward to seeing her kids and  today.    Objective:  Vitals:    18 1631 18 2341 18 0838 18 1145   BP:  100/54 101/50 108/58   Pulse:       Resp:     Temp: 97.7  F (36.5  C) 97.8  F (36.6  C) 98.1  F (36.7  C) 97.6  F (36.4  C)   TempSrc: Oral Oral Oral Oral   Weight:         Gen: Resting comfortably in bed, NAD  CV: RRR  Resp: CTAB, Breathing comfortably on room air  Abd: Gravid, non-tender, non-distended  Ext: non-tender, no edema     FHT: baseline 130, moderate variability, accelerations present, no decelerations  [place]: no contractions or irritability    Assessment: Enid Cisneros is a 38 year old  @ 34w3d by 22w6d, admitted for second bleed in the setting of placenta previa.    Plan:    Marginal Placenta Previa complicated with 2nd episode of  bleeding  - Last US  revealed an anterior lower placenta margin adjacent to the internal os, 2032g 66%tile, transverse with head to maternal right,   JEWEL 15.9, MVP 6.4cm  - Repeat ultrasound to evaluate for placental position on   - s/p Beta -  - type and crossed x2, type and screen Q72h (next )   - CS consent signed  - No digital cervical exams  - Reg diet, make NPO if  bleeding  - Hgb 11.4 on admission      Maternal hx of PACs/PVCs  - EKG nml ,   - s/p 48h Holter monitor in   - currently asymptomatic, no medication    Fetal wellbeing:  - Fetal head maternal L, transverse on scan   - reactive NST  - Continue TID monitoring with NST  - NICU consult completed     -PNC: Rh+/RI    -Dispo: Inpatient until delivery unless there is significant migration of placenta on follow up ultrasound on . Delivery by c/s if she has a significant amount of bleeding.     Tia  Andres CHAO  Ob/Gyn PGY3  11:59 AM  07/08/2018

## 2018-07-08 NOTE — PLAN OF CARE
Problem: Bleeding, Second or Third Trimester (Adult,Obstetrics,Pediatric)  Goal: Signs and Symptoms of Listed Potential Problems Will be Absent, Minimized or Managed (Bleeding, Second or Third Trimester)  Signs and symptoms of listed potential problems will be absent, minimized or managed by discharge/transition of care (reference Bleeding, Second or Third Trimester (Adult,Obstetrics,Pediatric) CPG).   Outcome: No Change  Pt has had minimal spotting today. VSS. Denies contractions. Monitoring shows moderate variability with accels, no decels. Irritability present. Continue to monitor.

## 2018-07-09 ENCOUNTER — HOSPITAL ENCOUNTER (INPATIENT)
Dept: ULTRASOUND IMAGING | Facility: CLINIC | Age: 38
End: 2018-07-09
Payer: COMMERCIAL

## 2018-07-09 LAB
ABO + RH BLD: NORMAL
ABO + RH BLD: NORMAL
BLD GP AB SCN SERPL QL: NORMAL
BLOOD BANK CMNT PATIENT-IMP: NORMAL
INTERPRETATION ECG - MUSE: NORMAL
SPECIMEN EXP DATE BLD: NORMAL

## 2018-07-09 PROCEDURE — 86901 BLOOD TYPING SEROLOGIC RH(D): CPT | Performed by: STUDENT IN AN ORGANIZED HEALTH CARE EDUCATION/TRAINING PROGRAM

## 2018-07-09 PROCEDURE — 86900 BLOOD TYPING SEROLOGIC ABO: CPT | Performed by: STUDENT IN AN ORGANIZED HEALTH CARE EDUCATION/TRAINING PROGRAM

## 2018-07-09 PROCEDURE — 76817 TRANSVAGINAL US OBSTETRIC: CPT

## 2018-07-09 PROCEDURE — 25000132 ZZH RX MED GY IP 250 OP 250 PS 637: Performed by: INTERNAL MEDICINE

## 2018-07-09 PROCEDURE — 93010 ELECTROCARDIOGRAM REPORT: CPT | Mod: 76 | Performed by: INTERNAL MEDICINE

## 2018-07-09 PROCEDURE — 12000028 ZZH R&B OB UMMC

## 2018-07-09 PROCEDURE — 36415 COLL VENOUS BLD VENIPUNCTURE: CPT | Performed by: STUDENT IN AN ORGANIZED HEALTH CARE EDUCATION/TRAINING PROGRAM

## 2018-07-09 PROCEDURE — 86850 RBC ANTIBODY SCREEN: CPT | Performed by: STUDENT IN AN ORGANIZED HEALTH CARE EDUCATION/TRAINING PROGRAM

## 2018-07-09 RX ADMIN — PRENATAL VIT W/ FE FUMARATE-FA TAB 27-0.8 MG 1 TABLET: 27-0.8 TAB at 08:18

## 2018-07-09 NOTE — PLAN OF CARE
Problem: Patient Care Overview  Goal: Plan of Care/Patient Progress Review  Outcome: No Change  VSS. Denies bleeding, LOF, cramping, and ctx. Active fetal movement. Fetal monitoring AGA. Ctx's seen during monitor, not felt by pt. Pt encouraged to orally hydrate and ctx decreased to 1 over the next hour. Pt encouraged to report if feeling cramping, backache, and/or ctx. Continue with current plan of care.

## 2018-07-09 NOTE — PROGRESS NOTES
MFM Antepartum Progress Note    Subjective: Patient feeling well.  No bleeding, contractions, pressure.  Normal fetal movement.  Discussed delivery planning.    Objective:  Vitals:    18 1145 18 1605 18 2321 18 0820   BP: 108/58 121/66 104/53 103/58   Pulse:       Resp:  16 16 16   Temp: 97.6  F (36.4  C) 98.2  F (36.8  C) 98.3  F (36.8  C) 98.2  F (36.8  C)   TempSrc: Oral Oral Oral Oral   Weight:         Gen: Resting comfortably in bed, NAD  CV: RRR  Resp: CTAB, Breathing comfortably on room air  Abd: Gravid, non-tender, non-distended  Ext: non-tender, no edema     FHT: baseline 130, moderate variability, accelerations present, no decelerations  [place]: no contractions or irritability    Assessment: Enid Cisneros is a 38 year old  @ 34w4d by 22w6d, admitted for second bleed in the setting of placenta previa.    Plan:    Marginal Placenta Previa complicated with 2nd episode of bleeding  - Last US  showed anterior marginal previa.  Vaginal delivery is contraindicated and at this gestational age, likelihood of migration of the placenta is low.   planned for  @ 37w0d.  Will not give rescue BMZ as she is >34wga.  - Last growth US  revealed an anterior lower placenta margin adjacent to the internal os, 2032g 66%tile, transverse with head to maternal right,   JEWEL 15.9, MVP 6.4cm.  Repeat growth in 1 week.  - s/p Beta -20  - type and crossed x2, type and screen Q72h (next )   - CS consent signed  - No digital cervical exams  - Reg diet, make NPO if  bleeding  - Hgb 11.4 on admission    Maternal hx of PACs/PVCs  - EKG nml ,   - s/p 48h Holter monitor in   - currently asymptomatic, no medication    Fetal wellbeing:  - Fetal head maternal L, transverse on scan   - reactive NST  - Continue TID monitoring with NST  - NICU consult completed     -PNC: Rh+/RI    -Dispo: Inpatient until delivery     Mel Campos MD  Ob/Gyn PGY3  9:27 AM  2018

## 2018-07-09 NOTE — PROVIDER NOTIFICATION
07/08/18 6559   Provider Notification   Provider Name/Title Dr. Monroy   Method of Notification In Department   Notification Reason Uterine Activity     Per MD, pt to orally hydrate and continue monitoring for 1 additional hour to see if ctx space out/stop. Pt in agreement with plan

## 2018-07-09 NOTE — PROGRESS NOTES
Uterine irritability and few contractions noted on toco.   Baseline 130, moderate variability, + acels, no decels    Patient reports she is not feeling any contractions. [place] abdominal or back cramps. No vaginal bleeding today. Reports baby is very active tonight.    Encouraged PO hydration and patient's water cup was filled.  Will continue monitoring for another hour.    Griselda Monroy MD PhD  Ob/Gyn PGY-3  7/8/2018 9:15 PM

## 2018-07-09 NOTE — PLAN OF CARE
Data: Dx: pt still has previa from U/S this morning. No bleeding. No contractions. Denies LOF. Positive fetal movement. Vitals stable. FHT: 130 baseline with mod variability with accels and no decels.     Plan: Scheduled a C/S for Thursday 7/26 at 37 weeks.  Monitoring once/shift and as needed. Pt informed to let RN know if there is any changes: bleeding, contractions, LOF.

## 2018-07-09 NOTE — PROVIDER NOTIFICATION
07/08/18 2050   Provider Notification   Provider Name/Title Dr. Monroy    Method of Notification Electronic Page   Notification Reason Uterine Activity     Provider notified pt having intermit ctx with irritability, pt not feeling ctx.

## 2018-07-09 NOTE — PROGRESS NOTES
Strip Review    Baseline 130, moderate variability, + acels, no decels  [place]: rare contraction present    Griselda Monroy MD PhD  Ob/Gyn PGY-3  7/9/2018 6:48 AM

## 2018-07-09 NOTE — PLAN OF CARE
Problem: Patient Care Overview  Goal: Plan of Care/Patient Progress Review  Outcome: No Change  Afebrile, VSS. Pt denies bldg, LOF, pain, ctx. FHR AGA w/mod variability, +acels, no decels. Some irritability on toco, pt not feeling. Pt will get US this morning to determine if previa is still present. If not, possible d/c today. Continue to monitor, contact MD with questions/concerns.    Problem: Bleeding, Second or Third Trimester (Adult,Obstetrics,Pediatric)  Goal: Signs and Symptoms of Listed Potential Problems Will be Absent, Minimized or Managed (Bleeding, Second or Third Trimester)  Signs and symptoms of listed potential problems will be absent, minimized or managed by discharge/transition of care (reference Bleeding, Second or Third Trimester (Adult,Obstetrics,Pediatric) CPG).   Outcome: No Change  Denies bldg, pt stable.

## 2018-07-10 PROCEDURE — 12000028 ZZH R&B OB UMMC

## 2018-07-10 PROCEDURE — 25000132 ZZH RX MED GY IP 250 OP 250 PS 637: Performed by: INTERNAL MEDICINE

## 2018-07-10 RX ADMIN — PRENATAL VIT W/ FE FUMARATE-FA TAB 27-0.8 MG 1 TABLET: 27-0.8 TAB at 08:04

## 2018-07-10 NOTE — PLAN OF CARE
Problem: Patient Care Overview  Goal: Individualization & Mutuality  Outcome: Therapy, progress toward functional goals as expected  Data: VSS, afebrile. Maternal status stable. Fetal assessment is normal.  Action: Continue with plan of care, which is TID fetal and uterine monitoring. Patient encouraged to move extremities while in bed.  Response: Patient coping with plan of care.

## 2018-07-10 NOTE — PROGRESS NOTES
"CLINICAL NUTRITION SERVICES - ASSESSMENT NOTE     Nutrition Prescription    RECOMMENDATIONS FOR MDs/PROVIDERS TO ORDER:  None today    Malnutrition Status:    Patient does not meet two of the above criteria necessary for diagnosing malnutrition    Recommendations already ordered by Registered Dietitian (RD):  None today    Future/Additional Recommendations:  None today     REASON FOR ASSESSMENT  Enid Cisneros is a/an 38 year old female assessed by the dietitian for LOS    NUTRITION HISTORY  - Pt reports eating small, frequent meals at home PTA, not following any particular diet. Denies any food allergies or intolerances. She reports taking a prenatal vitamin at home.     CURRENT NUTRITION ORDERS  Diet: Regular  Intake/Tolerance: 100% of meals per flowsheet.     LABS  Labs reviewed    MEDICATIONS  Medications reviewed  - Prenatal multivitamin plus iron     ANTHROPOMETRICS  Height: 0 cm (Data Unavailable)  Ht Readings from Last 2 Encounters:   03/08/18 1.702 m (5' 7\")   02/16/15 1.707 m (5' 7.2\")   Most Recent Weight: 99.1 kg (218 lb 6.4 oz)    Prepregnancy Weight: 84.8 kg (187 lb)  IBW: 61.4 kg (138% IBW from prepregnancy weight)  BMI: 29.3 kg/m2 - Overweight BMI 25-29.9  Weight History: Per wt records, pt has gained 31 lbs so far this pregnancy. Based on prepregnancy BMI of 25.0-29.9 kg/m2, it is recommend pt overall gain 15-25 lbs (6.8-11.4 kg) total in this pregnancy.  Wt Readings from Last 10 Encounters:   07/10/18 99.1 kg (218 lb 6.4 oz)   06/27/18 97.5 kg (215 lb)   06/06/18 97.5 kg (215 lb)   05/09/18 93.9 kg (207 lb)   04/11/18 91.2 kg (201 lb)   03/08/18 88 kg (194 lb)   03/02/18 09/08/17 89 kg (196 lb 4 oz)  84.8 kg (187 lb) -- OSH   08/06/15 84.4 kg (186 lb)     Dosing Weight: 85 kg - adjusted from prepregnancy weight    ASSESSED NUTRITION NEEDS  Estimated Energy Needs: 2575 kcals/day (25 kcals/kg + 450 kcal/day)  Justification: Pregnancy   Estimated Protein Needs:  grams protein/day (1 - 1.2 grams " of pro/kg)  Justification: Pregnancy   Estimated Fluid Needs: 8388-8285 mL/day (30 - 35 mL/kg of actual weight)   Justification: Pregnancy     PHYSICAL FINDINGS  See malnutrition section below.  34w5d Pregnant     MALNUTRITION  % Intake: No decreased intake noted  % Weight Loss: None noted  Subcutaneous Fat Loss: None observed  Muscle Loss: None observed  Fluid Accumulation/Edema: None noted  Malnutrition Diagnosis: Patient does not meet two of the above criteria necessary for diagnosing malnutrition    NUTRITION DIAGNOSIS  No nutrition diagnosis at this time     INTERVENTIONS  Implementation  Nutrition Education: discussed adequate PO intakes surrounding hospitalization and pt feels as though her appetite is good and she denies any questions or concerns at this time.       Monitoring/Evaluation  No nutrition follow-up warranted at this time. Please consult if further needs arise.      Roxane Live RD, LD  Unit Pager: [phone]

## 2018-07-10 NOTE — PLAN OF CARE
Problem: Patient Care Overview  Goal: Plan of Care/Patient Progress Review  Outcome: No Change  Patient's VSS, Patient denies any leakage of fluid or bleeding at this time. Patient rested well throughout the night. Patient denies feeling any contractions. No concerns at this time. Will continue to monitor and update provider with any changes or concerns.

## 2018-07-10 NOTE — PROGRESS NOTES
MFM Antepartum Progress Note    Subjective: Patient doing well today.  Nos bleeding/spotting, contractions, pressures, LOF overnight.  Ambulating.  No CP, calf pain/swelling.    Objective:  Vitals:    18 2128 07/10/18 0211 07/10/18 0600 07/10/18 0703   BP: 107/59  97/55    Pulse:       Resp:     Temp: 97.7  F (36.5  C)  97.8  F (36.6  C)    TempSrc: Oral  Oral    Weight:    99.1 kg (218 lb 6.4 oz)     Gen: Resting comfortably in bed, NAD  CV: RRR  Resp: CTAB, Breathing comfortably on room air  Abd: Gravid, non-tender, non-distended  Ext: non-tender, no edema     FHT: baseline 125, moderate variability, accelerations present, no decelerations  [place]: no contractions or irritability    Assessment: Enid Cisneros is a 38 year old  @ 34w5d by 22w6d, admitted for second bleed in the setting of placenta previa.    Plan:    Marginal Placenta Previa complicated with 2nd episode of bleeding  - Last US  showed anterior marginal previa.  Vaginal delivery is contraindicated and at this gestational age, likelihood of migration of the placenta is low.   planned for  1030AM @ 37w0d.  Will not give rescue BMZ as she is >34wga.  - Last growth US  revealed an anterior lower placenta margin adjacent to the internal os, 2032g 66%tile, transverse with head to maternal right,   JEWEL 15.9, MVP 6.4cm.  Repeat growth .  - s/p Beta -20  - type and crossed x2, type and screen Q72h (next )   - CS consent signed  - No digital cervical exams  - Reg diet, make NPO if bleeding  - Hgb 11.4 on admission    Maternal hx of PACs/PVCs  - EKG nml ,   - s/p 48h Holter monitor in   - currently asymptomatic, no medication    Fetal wellbeing:  - Fetal head maternal L, transverse on scan   - reactive NST  - Continue TID monitoring with NST  - NICU consult completed     -PNC: Rh+/RI    -Dispo: Inpatient until delivery     Mel Campos MD  Ob/Gyn PGY3  9:27 AM  07/10/2018

## 2018-07-10 NOTE — PROGRESS NOTES
Strip Review     Baseline 130, moderate variability, + acels, no decels  [place]: uterine irritability present    Griselda Monroy MD PhD  Ob/Gyn PGY-3  7/10/2018 6:53 AM

## 2018-07-11 PROCEDURE — 25000132 ZZH RX MED GY IP 250 OP 250 PS 637: Performed by: INTERNAL MEDICINE

## 2018-07-11 PROCEDURE — 25000128 H RX IP 250 OP 636: Performed by: STUDENT IN AN ORGANIZED HEALTH CARE EDUCATION/TRAINING PROGRAM

## 2018-07-11 PROCEDURE — 12000028 ZZH R&B OB UMMC

## 2018-07-11 PROCEDURE — 90715 TDAP VACCINE 7 YRS/> IM: CPT | Performed by: STUDENT IN AN ORGANIZED HEALTH CARE EDUCATION/TRAINING PROGRAM

## 2018-07-11 RX ORDER — ACETAMINOPHEN 325 MG/1
650 TABLET ORAL EVERY 4 HOURS PRN
Status: DISCONTINUED | OUTPATIENT
Start: 2018-07-11 | End: 2018-07-23

## 2018-07-11 RX ADMIN — CLOSTRIDIUM TETANI TOXOID ANTIGEN (FORMALDEHYDE INACTIVATED), CORYNEBACTERIUM DIPHTHERIAE TOXOID ANTIGEN (FORMALDEHYDE INACTIVATED), BORDETELLA PERTUSSIS TOXOID ANTIGEN (GLUTARALDEHYDE INACTIVATED), BORDETELLA PERTUSSIS FILAMENTOUS HEMAGGLUTININ ANTIGEN (FORMALDEHYDE INACTIVATED), BORDETELLA PERTUSSIS PERTACTIN ANTIGEN, AND BORDETELLA PERTUSSIS FIMBRIAE 2/3 ANTIGEN 0.5 ML: 5; 2; 2.5; 5; 3; 5 INJECTION, SUSPENSION INTRAMUSCULAR at 13:00

## 2018-07-11 RX ADMIN — PRENATAL VIT W/ FE FUMARATE-FA TAB 27-0.8 MG 1 TABLET: 27-0.8 TAB at 07:49

## 2018-07-11 NOTE — PLAN OF CARE
Problem: Patient Care Overview  Goal: Plan of Care/Patient Progress Review  Pt slept well during the night. Stated she had small amount of spotting this morning.  Occasional contraction noted on the EFM, pt not feeling any. Baby with moderate variability, accels, and a VD. No complaints at this time.

## 2018-07-11 NOTE — PLAN OF CARE
Problem: Patient Care Overview  Goal: Individualization & Mutuality  Outcome: Therapy, progress toward functional goals as expected  Data:. Maternal status stable. Fetal assessment is normal  Action: Continue with plan of care, which is TID fetal and uterine monitoring. Patient IV line is painful at the flush. Will remove it and insert a new line. Patient encouraged to move extremities while in bed.  Response: Patient coping with plan of care but has a very flat affect.

## 2018-07-11 NOTE — PROGRESS NOTES
MFM Antepartum Progress Note    Subjective: Patient feeling well.  She has had some tan discharge but no bleeding.  Denies contractions or pressures.  She is feeling regular fetal movement.  No CP or SOB.    Objective:  Vitals:    07/10/18 0703 07/10/18 1427 07/10/18 2115 18 0635   BP:   101/55 99/55   Pulse:       Resp:   16 16   Temp:  98.1  F (36.7  C) 98.1  F (36.7  C) 98.6  F (37  C)   TempSrc:  Oral Oral Oral   Weight: 99.1 kg (218 lb 6.4 oz)        Gen: Resting comfortably in bed, NAD  CV: RRR  Resp: CTAB, breathing comfortably on room air  Abd: Gravid, non-tender, non-distended  Ext: non-tender, no edema     FHT: baseline 135, moderate variability, accelerations present, no decelerations  [place]: 2 contractions in 3 minutes    Assessment: Enid Cisneros is a 38 year old  @ 34w6d by 22w6d, admitted for second bleed in the setting of placenta previa.    Plan:    Marginal Placenta Previa complicated with 2nd episode of bleeding  - Last US  showed anterior marginal previa.  Vaginal delivery is contraindicated and at this gestational age, likelihood of migration of the placenta is low.   planned for  1030AM @ 37w0d.  Will not give rescue BMZ as she is >34wga.  - Last growth US  revealed an anterior lower placenta margin adjacent to the internal os, 2032g 66%tile, transverse with head to maternal right,   JEWEL 15.9, MVP 6.4cm.  Repeat growth .  - s/p Beta -  - type and crossed x2, type and screen Q72h (next )   - CS consent signed  - No digital cervical exams  - Reg diet, make NPO if bleeding  - Hgb 11.4 on admission    Maternal hx of PACs/PVCs  - EKG nml ,   - s/p 48h Holter monitor in   - currently asymptomatic, no medication    Fetal wellbeing:  - Fetal head maternal L, transverse on scan   - reactive NST  - Continue TID monitoring with NST  - NICU consult completed     -PNC: Rh+/RI    -Dispo: Inpatient until delivery      [facility] is  following along with patient and their expertise and support is appreciated    Mel Campos MD  Ob/Gyn PGY3  11:05 AM  07/11/2018

## 2018-07-11 NOTE — PROGRESS NOTES
"SPIRITUAL HEALTH SERVICES  SPIRITUAL ASSESSMENT Progress Note  [facility] ([facility]) Ante     REFERRAL SOURCE: Follow-up    Shared reflective conversation with pt Enid, emphasizing changing expectations and coping. Enid said that Monday was \"hard\" because she found out that she would have to stay until delivery, rather than going home this week. She shared that she took Monday to \"just be sad,\" but has found ways to make life more interesting in the last couple days: doing puzzles, drawing, working, and going outside. Enid appreciates visitors, especially her  and kids, and opportunities to leave her room. She expressed concern about things at home not being taken care of while she's hospitalized, but identified her mother as a source of support: \"doing the things I thought I would have time to do before the baby gets here.\" Enid lamented not having a j luis community \"where everyone knows each other\" and where \"people would know if something happened to my family.\" She still finds personal prayer meaningful and supportive.    PLAN: Will follow-up 1x/week for duration of hospitalization.    Pattie Brink   Intern  Pager [phone]  * [facility] remains available 24/7 for emergent requests/referrals, either by having the switchboard page the on-call  or by entering an ASAP/STAT consult in Epic (this will also page the on-call ).*  "

## 2018-07-11 NOTE — PLAN OF CARE
Pt visiting with family this evening. Denies bleeding, leaking of fluid, or contractions. Baby is active. EFM applied. Some contractions noted, pt not feeling any. Baby with moderate varialibity and accels. No complaints at this time.

## 2018-07-12 PROCEDURE — 12000028 ZZH R&B OB UMMC

## 2018-07-12 PROCEDURE — 86850 RBC ANTIBODY SCREEN: CPT | Performed by: STUDENT IN AN ORGANIZED HEALTH CARE EDUCATION/TRAINING PROGRAM

## 2018-07-12 PROCEDURE — 36415 COLL VENOUS BLD VENIPUNCTURE: CPT | Performed by: STUDENT IN AN ORGANIZED HEALTH CARE EDUCATION/TRAINING PROGRAM

## 2018-07-12 PROCEDURE — 86901 BLOOD TYPING SEROLOGIC RH(D): CPT | Performed by: STUDENT IN AN ORGANIZED HEALTH CARE EDUCATION/TRAINING PROGRAM

## 2018-07-12 PROCEDURE — 86900 BLOOD TYPING SEROLOGIC ABO: CPT | Performed by: STUDENT IN AN ORGANIZED HEALTH CARE EDUCATION/TRAINING PROGRAM

## 2018-07-12 PROCEDURE — 25000132 ZZH RX MED GY IP 250 OP 250 PS 637: Performed by: INTERNAL MEDICINE

## 2018-07-12 RX ADMIN — PRENATAL VIT W/ FE FUMARATE-FA TAB 27-0.8 MG 1 TABLET: 27-0.8 TAB at 08:28

## 2018-07-12 NOTE — PROGRESS NOTES
MFM Daily Note    S: No VB or spotting. Goof FM. No LOF    O:  /57  Pulse 90  Temp 97.4  F (36.3  C)  Resp 16  Wt 99.1 kg (218 lb 6.4 oz)  LMP 2017 (Exact Date)  BMI 34.21 kg/m2    Lungs clear  RRR   Abd - NT  Ext - NT    FHT - 145, mod jose, accels, no decels.  [place] - Flat    Assessment: Enid Cisneros is a 38 year old  @ 35w0dd by 22w6d, admitted for second bleed in the setting of placenta previa.     Plan:     Marginal Placenta Previa complicated with 2nd episode of bleeding  - Last US  showed anterior marginal previa.  Vaginal delivery is contraindicated and at this gestational age, likelihood of migration of the placenta is low.   planned for  1030AM @ 37w0d.  Will not give rescue BMZ as she is >34wga.  - Last growth US  revealed an anterior lower placenta margin adjacent to the internal os, 2032g 66%tile, transverse with head to maternal right,   JEWEL 15.9, MVP 6.4cm.  Repeat growth .  - s/p Beta -20  - type and crossed x2, type and screen Q72h (next )   - CS consent signed  - No digital cervical exams  - Reg diet, make NPO if bleeding  - Hgb 11.4 on admission     Maternal hx of PACs/PVCs  - EKG nml ,   - s/p 48h Holter monitor in   - currently asymptomatic, no medication     Fetal wellbeing:  - Fetal head maternal L, transverse on scan   - Continue TID monitoring   - NICU consult completed     Colin Cordova MD  Maternal-Fetal Medicine

## 2018-07-12 NOTE — PROGRESS NOTES
Strip Review    Baseline 135, moderate variability, + acels, no decels    [place]: no contractions    Category I, reactive    Griselda Monroy MD PhD  Ob/Gyn PGY-3  7/11/2018 11:58 PM

## 2018-07-12 NOTE — PLAN OF CARE
Problem: Patient Care Overview  Goal: Plan of Care/Patient Progress Review  Outcome: No Change  Afebrile, VSS. Pt denies bldg, LOF, pain, ctx. FHR AGA w/mod variability, +acels, no decels. Slept well overnight, no issues. Continue to monitor, contact MD with questions/concerns.    Problem: Bleeding, Second or Third Trimester (Adult,Obstetrics,Pediatric)  Goal: Signs and Symptoms of Listed Potential Problems Will be Absent, Minimized or Managed (Bleeding, Second or Third Trimester)  Signs and symptoms of listed potential problems will be absent, minimized or managed by discharge/transition of care (reference Bleeding, Second or Third Trimester (Adult,Obstetrics,Pediatric) CPG).   Outcome: No Change  Pt denies bldg. Pt stable.

## 2018-07-12 NOTE — PLAN OF CARE
Problem: Patient Care Overview  Goal: Plan of Care/Patient Progress Review  Outcome: No Change  VSS. Denies bleeding, LOF, cramping and ctx. Active fetal movement. Fetal monitoring AGA. Continue with current plan of care.

## 2018-07-13 PROCEDURE — 25000132 ZZH RX MED GY IP 250 OP 250 PS 637: Performed by: INTERNAL MEDICINE

## 2018-07-13 PROCEDURE — 12000028 ZZH R&B OB UMMC

## 2018-07-13 RX ADMIN — PRENATAL VIT W/ FE FUMARATE-FA TAB 27-0.8 MG 1 TABLET: 27-0.8 TAB at 08:01

## 2018-07-13 NOTE — PROGRESS NOTES
Strip Review    Baseline 135, moderate variability, + acels, no decels  [place]: rare contractions    Griselda Monroy MD PhD  Ob/Gyn PGY-3  7/13/2018 6:46 AM

## 2018-07-13 NOTE — PLAN OF CARE
Problem: Bleeding, Second or Third Trimester (Adult,Obstetrics,Pediatric)  Goal: Signs and Symptoms of Listed Potential Problems Will be Absent, Minimized or Managed (Bleeding, Second or Third Trimester)  Signs and symptoms of listed potential problems will be absent, minimized or managed by discharge/transition of care (reference Bleeding, Second or Third Trimester (Adult,Obstetrics,Pediatric) CPG).   Outcome: No Change  VSS. Pt. States she slept well during the night. Denies contractions, cramping, LOF, or pain. No bleeding this shift. See flowsheet for monitoring. +FM. Pt. Has no needs/concerns at this time. Will continue with plan of care.

## 2018-07-13 NOTE — PLAN OF CARE
Problem: Patient Care Overview  Goal: Individualization & Mutuality  Outcome: Therapy, progress toward functional goals as expected  Data: Patient is comfortable without complains. Maternal status stable. Fetal assessment is normal and reactive  Action: Continue with plan of care, which is TID fetal and uterine monitoring. . Patient encouraged to move extremities while in bed.  Response: Patient coping with plan of care.

## 2018-07-13 NOTE — PROGRESS NOTES
MFM Antepartum Progress Note    Subjective: Patient denies complaints today.  She denies bleeding, contractions or cramping.  She has felt regular fetal movement.  No CP or SOB, no calf tenderness or swelling.    Objective:  Vitals:    18 1157 18 2027 18 2356 18 1401   BP: 105/57 105/58 96/54 104/59   Pulse:       Resp:    Temp: 97.4  F (36.3  C) 97.7  F (36.5  C) 97.9  F (36.6  C) 98.4  F (36.9  C)   TempSrc:  Oral Oral Oral   Weight:         Gen: Resting comfortably in bed, NAD  CV: RRR  Resp: CTAB, breathing comfortably on room air  Abd: Gravid, non-tender, non-distended  Ext: non-tender, trace edema     FHT: baseline 130, moderate variability, accelerations present, no decelerations  [place]: No contractions    Assessment: Enid Cisneros is a 38 year old  @ 35w2d by 22w6d, admitted for recurrent bleeding in the setting of placenta previa.    Plan:    Marginal Placenta Previa complicated with 2nd episode of bleeding with additional bleeds during current hospitalization - currently stable  - Last US  showed anterior marginal previa.  Vaginal delivery is contraindicated and at this gestational age, likelihood of migration of the placenta is low.   planned for  1030AM @ 37w0d.  Will not give rescue BMZ as she is >34wga.  - Last growth US  revealed an anterior lower placenta margin adjacent to the internal os, 2032g 66%tile, transverse with head to maternal right,   JEWEL 15.9, MVP 6.4cm.  Repeat growth .  - s/p Beta -  - type and crossed x2, type and screen Q72h (next 7/15)   - CS consent signed  - No digital cervical exams  - Reg diet, make NPO if bleeding  - Hgb 11.4 on admission    Maternal hx of PACs/PVCs  - EKG nml ,   - s/p 48h Holter monitor in   - currently asymptomatic, no medication    Fetal wellbeing:  - Fetal head maternal L, transverse on scan   - Reactive NST  - Continue TID monitoring with NST  - NICU consult completed      -PNC: Rh+/RI    -Dispo: Given recurrent vaginal bleeding in context of known placenta previa, will plan continued inpatient management until delivery      [facility] is following along with patient and their expertise and support is appreciated    Mel Campos MD  Ob/Gyn PGY3  2018    Physician Attestation   IPaulo, saw this patient with the resident and agree with the resident and/or medical student's findings and plan of care as documented in the note.      I personally reviewed vital signs, medications, labs, imaging and EFM.    Key findings: In summary, Enid Cisneros is a  at 35w2d admitted with recurrent vaginal bleeding in context of known placenta previa.  Currently maternal and fetal status are reassuring and stable.  Plan continued inpatient management until delivery at this time.    Paulo Pena MD  Date of Service (when I saw the patient): 18    Time Spent on this Encounter   IPaulo, spent a total of 15 minutes bedside and on the inpatient unit today managing the care of Enid Cisneros.  Over 50% of my time on the unit was spent counseling the patient and /or coordinating care regarding pregnancy complicated by placenta previa with recurrent vaginal bleeding. See note for details.    Paulo Pena

## 2018-07-13 NOTE — PROGRESS NOTES
MFM Antepartum Progress Note    Subjective: Patient feeling today.  She denies bleeding, contractions or cramping.  Normal fetal movement.  No CP or SOB    Objective:  Vitals:    18 2335 18 1157 18 2027 18 2356   BP: 96/54 105/57 105/58 96/54   Pulse:       Resp:   16   Temp: 98.4  F (36.9  C) 97.4  F (36.3  C) 97.7  F (36.5  C) 97.9  F (36.6  C)   TempSrc: Oral  Oral Oral   Weight:         Gen: Resting comfortably in bed, NAD  CV: RRR  Resp: CTAB, breathing comfortably on room air  Abd: Gravid, non-tender, non-distended  Ext: non-tender, no edema     FHT: baseline 130, moderate variability, accelerations present, no decelerations  [place]: no contractions    Assessment: Enid Cisneros is a 38 year old  @ 35w1d by 22w6d, admitted for second bleed in the setting of placenta previa.    Plan:    Marginal Placenta Previa complicated with 2nd episode of bleeding  - Last US  showed anterior marginal previa.  Vaginal delivery is contraindicated and at this gestational age, likelihood of migration of the placenta is low.   planned for  1030AM @ 37w0d.  Will not give rescue BMZ as she is >34wga.  - Last growth US  revealed an anterior lower placenta margin adjacent to the internal os, 2032g 66%tile, transverse with head to maternal right,   JEWEL 15.9, MVP 6.4cm.  Repeat growth .  - s/p Beta -20  - type and crossed x2, type and screen Q72h (next )   - CS consent signed  - No digital cervical exams  - Reg diet, make NPO if bleeding  - Hgb 11.4 on admission    Maternal hx of PACs/PVCs  - EKG nml ,   - s/p 48h Holter monitor in   - currently asymptomatic, no medication    Fetal wellbeing:  - Fetal head maternal L, transverse on scan   - reactive NST  - Continue TID monitoring with NST  - NICU consult completed     -PNC: Rh+/RI    -Dispo: Inpatient until delivery      [facility] is following along with patient and their expertise and  support is appreciated    Mel Campos MD  Ob/Gyn PGY3  8:03 AM  07/13/2018

## 2018-07-14 PROCEDURE — 12000028 ZZH R&B OB UMMC

## 2018-07-14 PROCEDURE — 25000132 ZZH RX MED GY IP 250 OP 250 PS 637: Performed by: INTERNAL MEDICINE

## 2018-07-14 RX ADMIN — PRENATAL VIT W/ FE FUMARATE-FA TAB 27-0.8 MG 1 TABLET: 27-0.8 TAB at 09:26

## 2018-07-14 NOTE — PLAN OF CARE
"Problem: Patient Care Overview  Goal: Plan of Care/Patient Progress Review  Outcome: No Change  VSS. Afebrile. Patient without any bleeding overnight- states last time she \"spotted\" was 4 days ago. Denies contractions or LOF. +FM, morning EFM in process. PIV flushed without difficulty. Slept overnight. Continue to monitor.       "

## 2018-07-14 NOTE — PLAN OF CARE
Problem: Bleeding, Second or Third Trimester (Adult,Obstetrics,Pediatric)  Goal: Signs and Symptoms of Listed Potential Problems Will be Absent, Minimized or Managed (Bleeding, Second or Third Trimester)  Signs and symptoms of listed potential problems will be absent, minimized or managed by discharge/transition of care (reference Bleeding, Second or Third Trimester (Adult,Obstetrics,Pediatric) CPG).   Outcome: Improving  Pt states is comfortable, denies bleeding leaking or feeling ctx's. Baby reactive, no ctx's on monitor. Wheelchair ride outside with  and kids.

## 2018-07-15 PROCEDURE — 36415 COLL VENOUS BLD VENIPUNCTURE: CPT | Performed by: STUDENT IN AN ORGANIZED HEALTH CARE EDUCATION/TRAINING PROGRAM

## 2018-07-15 PROCEDURE — 86900 BLOOD TYPING SEROLOGIC ABO: CPT | Performed by: STUDENT IN AN ORGANIZED HEALTH CARE EDUCATION/TRAINING PROGRAM

## 2018-07-15 PROCEDURE — 12000028 ZZH R&B OB UMMC

## 2018-07-15 PROCEDURE — 86901 BLOOD TYPING SEROLOGIC RH(D): CPT | Performed by: STUDENT IN AN ORGANIZED HEALTH CARE EDUCATION/TRAINING PROGRAM

## 2018-07-15 PROCEDURE — 25000132 ZZH RX MED GY IP 250 OP 250 PS 637: Performed by: INTERNAL MEDICINE

## 2018-07-15 PROCEDURE — 86850 RBC ANTIBODY SCREEN: CPT | Performed by: STUDENT IN AN ORGANIZED HEALTH CARE EDUCATION/TRAINING PROGRAM

## 2018-07-15 RX ADMIN — PRENATAL VIT W/ FE FUMARATE-FA TAB 27-0.8 MG 1 TABLET: 27-0.8 TAB at 08:34

## 2018-07-15 NOTE — PLAN OF CARE
Problem: Patient Care Overview  Goal: Plan of Care/Patient Progress Review  Patient vital signs stable and afebrile. Denies abdominal tenderness, cramping, or leaking. Patient slept well throughout shift and reports no changes. See flowsheet for toco/EFM interpretation. Will continue to monitor and update provider with any concerns or changes.

## 2018-07-15 NOTE — PROVIDER NOTIFICATION
07/15/18 0955   Provider Notification   Provider Name/Title Yemi Pena   Method of Notification At Bedside   Request Evaluate in Person   Notification Reason Status Update   Providers present for AM rounds. No new concerns at this time. Will proceed with ongoing assessment.

## 2018-07-15 NOTE — PLAN OF CARE
Problem: Patient Care Overview  Goal: Plan of Care/Patient Progress Review  Outcome: No Change  Pt. VSS and afebrile, pt. Denies any further bleeding, pain, or change in condition. 's moderate variability + accels, single ctx and occasional irritability noted on toco, see flowsheet for more details. Pt. Spending time visiting with family. Pt. Notified when to call out to RN, verbalized understanding and agreed to plan. Will proceed with ongoing assessment.

## 2018-07-15 NOTE — PROGRESS NOTES
MFM Antepartum Progress Note    Subjective: Patient denies complaints today.  She denies bleeding, contractions or cramping.  She has felt regular fetal movement.  No CP or SOB, no calf tenderness or swelling.    Objective:  Vitals:    18 0924 18 1235 18 2017 07/15/18 0611   BP: 102/59 101/56 98/53 96/53   Pulse:       Resp:     Temp: 98  F (36.7  C) 97.9  F (36.6  C) 97.6  F (36.4  C) 97.7  F (36.5  C)   TempSrc: Oral Oral Oral Oral   Weight:         Gen: Resting comfortably in bed, NAD  CV: RRR  Resp: CTAB, breathing comfortably on room air  Abd: Gravid, non-tender, non-distended  Ext: non-tender, trace edema     FHT: baseline 130, moderate variability, accelerations present, no decelerations  [place]: No contractions    Assessment: Enid Cisneros is a 38 year old  @ 35w3d by 22w6d, admitted for recurrent bleeding in the setting of placenta previa.    Plan:    Marginal Placenta Previa complicated with 2nd episode of bleeding with additional bleeds during current hospitalization - currently stable  - Last US  showed anterior marginal previa.  Vaginal delivery is contraindicated and at this gestational age, likelihood of migration of the placenta is low.   planned for  1030AM @ 37w0d.  Will not give rescue BMZ as she is >34wga.  - Last growth US  revealed an anterior lower placenta margin adjacent to the internal os, 2032g 66%tile, transverse with head to maternal right,   JEWEL 15.9, MVP 6.4cm.  Repeat growth .  - s/p Beta -20  - type and crossed x2, type and screen Q72h (next 7/15)   - CS consent signed  - No digital cervical exams  - Reg diet, make NPO if bleeding  - Hgb 11.4 on admission    Maternal hx of PACs/PVCs  - EKG nml ,   - s/p 48h Holter monitor in   - currently asymptomatic, no medication    Fetal wellbeing:  - Fetal head maternal L, transverse on scan   - Reactive NST  - Continue TID monitoring with NST  - NICU consult completed  6/30    -PNC: Rh+/RI    -Dispo: Given recurrent vaginal bleeding in context of known placenta previa, will plan continued inpatient management until delivery 7/26     Spiritual Health is following along with patient and their expertise and support is appreciated    Paulo Pena    Time Spent on this Encounter   I, Paulo Pena, spent a total of 15 minutes bedside and on the inpatient unit today managing the care of Enid Cisneros.  Over 50% of my time on the unit was spent counseling the patient and /or coordinating care regarding pregnancy complicated by placenta previa with recurrent vaginal bleeding. See note for details.    Paulo Pena

## 2018-07-16 PROCEDURE — 25000132 ZZH RX MED GY IP 250 OP 250 PS 637: Performed by: INTERNAL MEDICINE

## 2018-07-16 PROCEDURE — 12000028 ZZH R&B OB UMMC

## 2018-07-16 RX ADMIN — PRENATAL VIT W/ FE FUMARATE-FA TAB 27-0.8 MG 1 TABLET: 27-0.8 TAB at 09:20

## 2018-07-16 NOTE — PLAN OF CARE
Problem: Patient Care Overview  Goal: Plan of Care/Patient Progress Review  Outcome: Improving  VSS. Afebrile. Denies LOF, additional vaginal bleeds, or any changes in condition. FHT reactive, ctx not present. Continue with plan of care.

## 2018-07-16 NOTE — PROGRESS NOTES
Strip Review    Baseline 130, moderate variability, + acels, no decels  No contractions on toco    Griselda Monroy MD PhD  Ob/Gyn PGY-3  7/16/2018 6:56 AM

## 2018-07-16 NOTE — PLAN OF CARE
Problem: Patient Care Overview  Goal: Plan of Care/Patient Progress Review  Patient vital signs stable and afebrile. Denies leaking, bleeding or cramping/abdominal tenderness. Patient sleeping well throughout shift and reports no changes. Will continue to monitor and update provider with any concerns or changes.

## 2018-07-16 NOTE — PROGRESS NOTES
MFM Antepartum Progress Note    Subjective: Patient denies complaints today.  She denies bleeding, contractions or cramping.  She has felt regular fetal movement.  No CP or SOB, no calf tenderness or swelling.    Objective:  Vitals:    18 0615 18 0618 18 0619 18 0808   BP: (!) 87/48 (!) 87/48 (!) 87/50 96/54   Pulse:       Resp:    16   Temp:   98.1  F (36.7  C) 97.9  F (36.6  C)   TempSrc:   Oral Oral   Weight:         Gen: Resting comfortably in bed, NAD  CV: RRR  Resp: CTAB, breathing comfortably on room air  Abd: Gravid, non-tender, non-distended  Ext: non-tender, trace edema     FHT: baseline 130, moderate variability, accelerations present, no decelerations  [place]: No contractions    Assessment: Enid Cisneros is a 38 year old  @ 35w4d by 22w6d, admitted for recurrent bleeding in the setting of placenta previa.    Plan:    Marginal Placenta Previa complicated with 2nd episode of bleeding with additional bleeds during current hospitalization - currently stable  - Last US  showed anterior marginal previa.  Vaginal delivery is contraindicated and at this gestational age, likelihood of migration of the placenta is low.   planned for  1030AM @ 37w0d.  Will not give rescue BMZ as she is >34wga.  - Last growth US  revealed an anterior lower placenta margin adjacent to the internal os, 2032g 66%tile, transverse with head to maternal right,   JEWEL 15.9, MVP 6.4cm.  Repeat growth .  - s/p Beta -  - type and crossed x2, type and screen Q72h (next 7/15)   - CS consent signed  - No digital cervical exams  - Reg diet, make NPO if bleeding  - Hgb 11.4 on admission    Maternal hx of PACs/PVCs  - EKG nml ,   - s/p 48h Holter monitor in   - currently asymptomatic, no medication    Fetal wellbeing:  - Fetal head maternal L, transverse on scan   - Reactive NST  - Continue TID monitoring with NST  - NICU consult completed     -PNC: Rh+/RI    -Dispo:  Given recurrent vaginal bleeding in context of known placenta previa, will plan continued inpatient management until delivery 7/26      Paulo Pena    Time Spent on this Encounter   I, Paulo Pena, spent a total of 15 minutes bedside and on the inpatient unit today managing the care of Enid Cisneros.  Over 50% of my time on the unit was spent counseling the patient and /or coordinating care regarding pregnancy complicated by placenta previa with recurrent vaginal bleeding. See note for details.    Paulo Pena

## 2018-07-16 NOTE — PLAN OF CARE
Problem: Patient Care Overview  Goal: Plan of Care/Patient Progress Review  Outcome: No Change  Pt offers no complaints today.  Denies bleeding or any sx of labor.  Spoke at length about her c/section and what to expect.  Answered questions.  Will continue with cares per orders.

## 2018-07-17 ENCOUNTER — HOSPITAL ENCOUNTER (INPATIENT)
Dept: ULTRASOUND IMAGING | Facility: CLINIC | Age: 38
End: 2018-07-17
Payer: COMMERCIAL

## 2018-07-17 PROCEDURE — 76816 OB US FOLLOW-UP PER FETUS: CPT

## 2018-07-17 PROCEDURE — 76817 TRANSVAGINAL US OBSTETRIC: CPT | Performed by: STUDENT IN AN ORGANIZED HEALTH CARE EDUCATION/TRAINING PROGRAM

## 2018-07-17 PROCEDURE — 25000132 ZZH RX MED GY IP 250 OP 250 PS 637: Performed by: INTERNAL MEDICINE

## 2018-07-17 PROCEDURE — 12000028 ZZH R&B OB UMMC

## 2018-07-17 RX ADMIN — PRENATAL VIT W/ FE FUMARATE-FA TAB 27-0.8 MG 1 TABLET: 27-0.8 TAB at 08:18

## 2018-07-17 NOTE — PLAN OF CARE
Problem: Patient Care Overview  Goal: Plan of Care/Patient Progress Review  Outcome: No Change  Afebrile, VSS. Pt denies bldg, LOF, pain, ctx. Baby difficult to monitor this morning, very active. FHR AGA w/mod variability, +acels, no decels. Will have US this morning. Slept poorly, mentioned that she struggles to sleep long periods of time here. Continue to monitor, contact MD with questions/concerns.    Problem: Bleeding, Second or Third Trimester (Adult,Obstetrics,Pediatric)  Goal: Signs and Symptoms of Listed Potential Problems Will be Absent, Minimized or Managed (Bleeding, Second or Third Trimester)  Signs and symptoms of listed potential problems will be absent, minimized or managed by discharge/transition of care (reference Bleeding, Second or Third Trimester (Adult,Obstetrics,Pediatric) CPG).   Outcome: No Change  No active bldg, no spotting present. Continue to monitor.

## 2018-07-17 NOTE — PROGRESS NOTES
Strip Review    Baseline 130, moderate variability, + acels, no decels  [place]: no contractions    Griselda Monroy MD PhD  Ob/Gyn PGY-3  7/17/2018 2:27 AM

## 2018-07-17 NOTE — PLAN OF CARE
Problem: Patient Care Overview  Goal: Plan of Care/Patient Progress Review  Outcome: No Change  Patient has had no bleeding for six days. Will have IV restarted after family time.

## 2018-07-17 NOTE — PROGRESS NOTES
MFM Antepartum Progress Note    Subjective: Patient reports doing well today.  She has not had bleeding.  Denies contractions, cramping, or pressure. No CP or SOB, no calf tenderness or swelling.    Objective:   Vitals:    18 0808 18 1608 18 2340 18 0542   BP: 96/54 110/57 111/50    Pulse:       Resp:     Temp: 97.9  F (36.6  C) 98.2  F (36.8  C) 98  F (36.7  C)    TempSrc: Oral Oral Oral    Weight:    99.9 kg (220 lb 3.2 oz)     Gen: Resting comfortably in bed, NAD  CV: RRR  Resp: CTAB, breathing comfortably on room air  Abd: Gravid, non-tender, non-distended  Ext: non-tender, trace edema     FHT: baseline 115, moderate variability, accelerations present, no decelerations  [place]: No contractions    Assessment: Enid Cisneros is a 38 year old  @ 35w5d by 22w6d, admitted for recurrent bleeding in the setting of placenta previa.    Plan:    Marginal Placenta Previa complicated with 2nd episode of bleeding with additional bleeds during current hospitalization: currently stable, no recent bleeding.  - Last US  showed anterior marginal previa.  Vaginal delivery is contraindicated and at this gestational age, likelihood of migration of the placenta is low.   planned for  1030AM @ 37w0d.  Will not give rescue BMZ as she is >34wga.  - Growth US  revealed an anterior lower placenta margin adjacent to the internal os, 2032g 66%tile, transverse with head to maternal right,   JEWEL 15.9, MVP 6.4cm.  Repeat growth  showed appropriate interval growth, 91%st percentile.  Discussed with patient.  - s/p Beta -20  - type and crossed x2, type and screen Q72h (next )   - CS consent signed  - No digital cervical exams  - Reg diet, make NPO if bleeding  - Hgb 11.4 on admission    Maternal hx of PACs/PVCs:  - EKG nml ,   - s/p 48h Holter monitor in   - currently asymptomatic, no medication    Fetal wellbeing:  - Fetal head maternal L, transverse on scan   -  Reactive NST  - Continue TID monitoring  - NICU consult completed     -PNC: Rh+/RI    -Dispo: Given recurrent vaginal bleeding in context of known placenta previa, will plan continued inpatient management until delivery     Mel Campos MD  PGY-3 OB/GYN  [phone] (OB G3 Pager)  2018  9:24 AM     Physician Attestation   IPaulo, saw this patient with the resident and agree with the resident and/or medical student's findings and plan of care as documented in the note.      I personally reviewed vital signs, medications, labs, imaging and EFM.    Key findings: In summary, Enid Cisneros is a  at 35w5d admitted with placenta previa in context of recurrent vaginal bleeding.  Both maternal and fetal status are stable and reassuring and will continue inpatient management at this time.     Paulo Pena MD  Date of Service (when I saw the patient): 18    Time Spent on this Encounter   IPaulo, spent a total of 15 minutes bedside and on the inpatient unit today managing the care of Enid Cisneros.  Over 50% of my time on the unit was spent counseling the patient and /or coordinating care regarding pregnancy complicated by placenta previa with recurrent bleeding. See note for details.    Paulo Pena

## 2018-07-17 NOTE — PROVIDER NOTIFICATION
07/17/18 1639   Provider Notification   Provider Name/Title Dr. Fenton   Method of Notification In Department   Request Evaluate in Person   Notification Reason Other (Comment)   Talked with patient about using Midline IV vs having another peripheral IV started. Will go with peripheral IV. Patient would like IV started after family time.

## 2018-07-18 PROCEDURE — 12000028 ZZH R&B OB UMMC

## 2018-07-18 PROCEDURE — 25000132 ZZH RX MED GY IP 250 OP 250 PS 637: Performed by: INTERNAL MEDICINE

## 2018-07-18 PROCEDURE — 86901 BLOOD TYPING SEROLOGIC RH(D): CPT | Performed by: STUDENT IN AN ORGANIZED HEALTH CARE EDUCATION/TRAINING PROGRAM

## 2018-07-18 PROCEDURE — 86850 RBC ANTIBODY SCREEN: CPT | Performed by: STUDENT IN AN ORGANIZED HEALTH CARE EDUCATION/TRAINING PROGRAM

## 2018-07-18 PROCEDURE — 86900 BLOOD TYPING SEROLOGIC ABO: CPT | Performed by: STUDENT IN AN ORGANIZED HEALTH CARE EDUCATION/TRAINING PROGRAM

## 2018-07-18 PROCEDURE — 36415 COLL VENOUS BLD VENIPUNCTURE: CPT | Performed by: STUDENT IN AN ORGANIZED HEALTH CARE EDUCATION/TRAINING PROGRAM

## 2018-07-18 RX ADMIN — PRENATAL VIT W/ FE FUMARATE-FA TAB 27-0.8 MG 1 TABLET: 27-0.8 TAB at 10:23

## 2018-07-18 ASSESSMENT — ACTIVITIES OF DAILY LIVING (ADL)
RETIRED_COMMUNICATION: 0-->UNDERSTANDS/COMMUNICATES WITHOUT DIFFICULTY
FALL_HISTORY_WITHIN_LAST_SIX_MONTHS: NO
TOILETING: 0-->INDEPENDENT
RETIRED_EATING: 0-->INDEPENDENT
TRANSFERRING: 0-->INDEPENDENT
DRESS: 0-->INDEPENDENT
AMBULATION: 0-->INDEPENDENT
COGNITION: 0 - NO COGNITION ISSUES REPORTED
BATHING: 0-->INDEPENDENT
SWALLOWING: 0-->SWALLOWS FOODS/LIQUIDS WITHOUT DIFFICULTY

## 2018-07-18 NOTE — PLAN OF CARE
Problem: Patient Care Overview  Goal: Plan of Care/Patient Progress Review  Outcome: No Change  Data: Maternal status stable. VSS this shift, pt afebrile. Fetal assessment is appropriate for gestational age (see flowsheet). [place] reveals no occasional irritability with evening monitoring and frequent contractions and irritability with morning monitoring. Patient denies feeling contractions or pain. Contractions do not palpate. Pt denies vaginal bleeding or dizziness. Pt reports active fetal movement.   Action: Pt had voided prior to morning monitoring. 500 mL LR bolus initiated at 0648 to hopefully stop contractions/irritability. Continue with plan of care, which is light activity, TID monitoring, Q shift vitals. Patient encouraged to move extremities while in bed.  Response: Patient coping well, very pleasant tonight. Will continue to monitor.

## 2018-07-18 NOTE — PROGRESS NOTES
"SPIRITUAL HEALTH SERVICES  SPIRITUAL ASSESSMENT Progress Note  [facility] ([facility]) PSCU     REFERRAL SOURCE: Follow-up    Brief conversation with pt Enid, who was working and asked that I come back another time. She shared anticipation that \"I'm only a week away.\"    PLAN: Will follow-up 1x/week for duration of hospitalization.    Pattie Brink   Intern  Pager [phone]  * [facility] remains available 24/7 for emergent requests/referrals, either by having the switchboard page the on-call  or by entering an ASAP/STAT consult in Epic (this will also page the on-call ).*  "

## 2018-07-18 NOTE — PROGRESS NOTES
[facility] Antepartum Progress Note    Subjective: Patient reports doing well today.  She reports noticing some bright red spotting this am while wiping after using the bathroom. She denies contractions, cramping, or pressure. No CP or SOB, no calf tenderness or swelling.    Objective:   BP 99/54  Pulse 90  Temp 97.8  F (36.6  C) (Oral)  Resp 16  Wt 99.9 kg (220 lb 3.2 oz)  LMP 2017 (Exact Date)  BMI 34.49 kg/m2    Gen: Resting comfortably in bed, NAD  CV: RRR  Resp: CTAB, breathing comfortably on room air  Abd: Gravid, non-tender, non-distended  Ext: non-tender, trace edema     FHT: baseline 125, moderate variability, accelerations present, no decelerations  [place]: No contractions    Assessment: Enid Cisneros is a 38 year old  @ 35w6d by 22w6d, admitted for recurrent bleeding in the setting of placenta previa.    Plan:    Marginal Placenta Previa complicated with 2nd episode of bleeding with additional bleeds during current hospitalization: currently stable, now reporting some bright red spotting again.   - Last US  showed anterior marginal previa.  Vaginal delivery is contraindicated and at this gestational age, likelihood of migration of the placenta is low.   planned for  1030AM @ 37w0d.  Will not give rescue BMZ as she is >34wga.  - Growth US  revealed an anterior lower placenta margin adjacent to the internal os, 2032g 66%tile, transverse with head to maternal right,   JEWEL 15.9, MVP 6.4cm.  Repeat growth  showed appropriate interval growth, 91%st percentile.  Discussed with patient.  - s/p Beta -20  - type and crossed x2, type and screen Q72h (next )   - CS consent signed  - No digital cervical exams  - Reg diet, make NPO if bleeding  - Hgb 11.4 on admission    Maternal hx of PACs/PVCs:  - EKG nml ,   - s/p 48h Holter monitor in   - currently asymptomatic, no medication    Fetal wellbeing:  - Fetal head maternal L, transverse on scan   - Reactive  NST  - Continue TID monitoring  - NICU consult completed     -PNC: Rh+/RI    -Dispo: Given recurrent vaginal bleeding  in context of known placenta previa, will plan continued inpatient management until delivery .     Plan continue monitoring spotting for now if heavy we will proceed with delivery     Romie Aaron MD   MFM fellow   2018    Physician Attestation   I, Paulo Pena, saw this patient with the resident and agree with the resident and/or medical student's findings and plan of care as documented in the note.      I personally reviewed vital signs, medications, labs, imaging and EFM.    Key findings: In summary, Enid Cisneros is a  at 35w6d admitted with recurrent vaginal bleeding in context of know placenta previa.  Plan continued inpatient management at this time.      Paulo Pena MD  Date of Service (when I saw the patient): 18     Time Spent on this Encounter   I, Paulo Pena, spent a total of 15 minutes bedside and on the inpatient unit today managing the care of Enid Cisneros.  Over 50% of my time on the unit was spent counseling the patient and /or coordinating care regarding pregnancy complicated by placenta previa. See note for details.    Paulo Pena

## 2018-07-18 NOTE — PLAN OF CARE
Problem: Patient Care Overview  Goal: Plan of Care/Patient Progress Review  Outcome: Improving  This morning had an episode of bright red spotting which Enid saw on the toilet paper after using the BR. A few hours tater she noticed a trace amount of bright red blood and currently no bleeding. Had a few contractions earlier but none this afternoon during  monitoring. FHT's 125 with moderate variability and accelerations. VSS. Offers no complaints.

## 2018-07-19 PROCEDURE — 12000028 ZZH R&B OB UMMC

## 2018-07-19 PROCEDURE — 25000132 ZZH RX MED GY IP 250 OP 250 PS 637: Performed by: INTERNAL MEDICINE

## 2018-07-19 RX ADMIN — PRENATAL VIT W/ FE FUMARATE-FA TAB 27-0.8 MG 1 TABLET: 27-0.8 TAB at 08:52

## 2018-07-19 NOTE — PROVIDER NOTIFICATION
07/19/18 0830   Provider Notification   Provider Name/Title Becky   Method of Notification At Bedside   Request Evaluate in Person   Notification Reason Status Update   Provider at bedside to discuss plan of care. No new concerns at this time, will proceed with ongoing assessment.

## 2018-07-19 NOTE — PLAN OF CARE
Problem: Patient Care Overview  Goal: Plan of Care/Patient Progress Review  Outcome: No Change  Patient slept well through the night on her own. Denies pain, contractions, bleeding or loss of fluid. EFM as charted; VSS. Continue expectant management.

## 2018-07-19 NOTE — PROGRESS NOTES
MFM Antepartum Progress Note      Subjective: Patient reports doing well today. She has not experience further spotting since yesterday. She denies contractions, cramping, or pressure. No CP or SOB, no calf tenderness or swelling.    Objective:   /59  Pulse 90  Temp 97.6  F (36.4  C) (Oral)  Resp 16  Wt 99.9 kg (220 lb 3.2 oz)  LMP 2017 (Exact Date)  BMI 34.49 kg/m2    Gen: Resting comfortably in bed, NAD  CV: RRR  Resp: CTAB, breathing comfortably on room air  Abd: Gravid, non-tender, non-distended  Ext: non-tender, trace edema     FHT: baseline 125, moderate variability, accelerations present, occasional subtle variables  [place]: Irritability     Assessment: Enid Cisneros is a 38 year old  @ 36w0d by 22w6d, admitted for recurrent bleeding in the setting of placenta previa.    Plan:    Marginal Placenta Previa complicated with 2nd episode of bleeding with additional bleeds during current hospitalization: currently stable. Episode of spotting  resolved.   - Last US  showed anterior marginal previa.  Vaginal delivery is contraindicated and at this gestational age, likelihood of migration of the placenta is low.   planned for  1030AM @ 37w0d.  Will not give rescue BMZ as she is >34wga.  - Growth US  revealed an anterior lower placenta margin adjacent to the internal os, 2032g 66%tile, transverse with head to maternal right,   JEWEL 15.9, MVP 6.4cm.  Repeat growth  showed appropriate interval growth, 91%st percentile.  Discussed with patient.  - s/p Beta -  - type and crossed x2, type and screen Q72h (next )   - CS consent signed  - No digital cervical exams  - Reg diet, make NPO if bleeding  - Hgb 11.4 on admission    Maternal hx of PACs/PVCs:  - EKG nml ,   - s/p 48h Holter monitor in   - currently asymptomatic, no medication    Fetal wellbeing:  - Fetal head maternal L, transverse on scan   - Reactive NST  - Continue TID monitoring  - NICU  consult completed     -PNC: Rh+/RI    -Dispo: Given recurrent vaginal bleeding  in context of known placenta previa, will plan continued inpatient management until delivery .     Plan continue monitoring spotting for now if heavy we will proceed with delivery     Romie Aaron MD   MFM fellow   2018    Physician Attestation   IPaulo, saw this patient with the resident and agree with the resident and/or medical student's findings and plan of care as documented in the note.      I personally reviewed vital signs, medications, labs, imaging and EFM.    Key findings: In summary, Enid Cisneros is a  at 36w0d admitted with recurrent bleeding in context of know placenta previa.  Both maternal and fetal status are stable and reassuring.  Plan to continue inpatient expectant management at this time.    Paulo Pena MD  Date of Service (when I saw the patient): 18    Time Spent on this Encounter   IPaulo, spent a total of 15 minutes bedside and on the inpatient unit today managing the care of Enid Cisneros.  Over 50% of my time on the unit was spent counseling the patient and /or coordinating care regarding pregnancy complicated by placenta previa with recurrent bleeding. See note for details.    Paulo Pena

## 2018-07-19 NOTE — PLAN OF CARE
Problem: Patient Care Overview  Goal: Plan of Care/Patient Progress Review  Outcome: No Change  Denies any bleeding, LOF, pain. Patient rested in bed most of this shift and did laundry. Fetal oxygenation assumed adequate (see flowsheet). No contractions noted. VSS afebrile. Will continue current plan of cares.

## 2018-07-19 NOTE — PROGRESS NOTES
"SPIRITUAL HEALTH SERVICES  SPIRITUAL ASSESSMENT Progress Note  [facility] ([facility]) PSCU     REFERRAL SOURCE: Follow-up    Reflective conversation shared with pt Enid, integrating themes of life stressors, gratitude, support systems, and spirituality. Enid said that she can \"see the light at the end of the tunnel\" with her delivery date \"exactly a week away.\" She explored what she imagined life to look like after the baby is born and she returns home, especially concerns about how to cope with being a mother of three recovering from a surgery. We discussed option for additional support from her family and community, encouraging conversations with those people before delivery to help calm anxiety about \"not having a plan.\" Enid expressed nerves about the logistics of her , especially seeing \"when they pull the baby out of my belly.\" I encouraged follow-up conversation with the medical team about those nerves. Enid was tearful as she expressed feeling like she was \"failing my  and my kids\" by being in the hospital, but that her nurse yesterday  helped reframe her self-image as \"already being a good mom to my baby boy right now,\" which was powerful and helpful to her. Enid's j luis helps her \"remember all the things I have to be grateful for\" but that she often feels like \"I think backwards about that.\" Together, we normalized her conflicting feelings of gratitude and frustration and her tears.    PLAN: Will follow-up 1-2x/week for duration of hospitalization.    Pattie Brink   Intern  Pager [phone]  * [facility] remains available  for emergent requests/referrals, either by having the switchboard page the on-call  or by entering an ASAP/STAT consult in Epic (this will also page the on-call ).*    "

## 2018-07-19 NOTE — PROVIDER NOTIFICATION
07/19/18 0705   Provider Notification   Provider Name/Title Dr. Monroy   Method of Notification Phone   Notification Reason Decels     Provider notified of 2 apparent variables that came off of accelerations. Provider not near a computer but stated that she could come off monitor. Charge nurse asked to look at strip and agreed with taking baby off monitor.

## 2018-07-19 NOTE — PLAN OF CARE
Problem: Patient Care Overview  Goal: Plan of Care/Patient Progress Review  Outcome: No Change  Pt. VSS and afebrile. Pt. Denies any further bleeding, pain or change of condition. Pt. , moderate variability + accels, occasional ctx and irritability noted on monitor, pt unaware of ctx. Pt. Spent day resting and watching TV, notified when to call out to RN, verbalized understanding and agreed to plan. Will proceed with ongoing assessment.

## 2018-07-20 PROCEDURE — 25000132 ZZH RX MED GY IP 250 OP 250 PS 637: Performed by: INTERNAL MEDICINE

## 2018-07-20 PROCEDURE — 87653 STREP B DNA AMP PROBE: CPT | Performed by: OBSTETRICS & GYNECOLOGY

## 2018-07-20 PROCEDURE — 12000028 ZZH R&B OB UMMC

## 2018-07-20 RX ADMIN — PRENATAL VIT W/ FE FUMARATE-FA TAB 27-0.8 MG 1 TABLET: 27-0.8 TAB at 11:59

## 2018-07-20 NOTE — PROGRESS NOTES
MFM Antepartum Progress Note      Subjective: Patient reports doing well today. She has not experience further spotting since yesterday. She denies contractions, cramping, or pressure. No CP or SOB, no calf tenderness or swelling.    Objective:   /59  Pulse 90  Temp 97.6  F (36.4  C) (Oral)  Resp 16  Wt 99.9 kg (220 lb 3.2 oz)  LMP 2017 (Exact Date)  BMI 34.49 kg/m2    Gen: Resting comfortably in bed, NAD  CV: RRR  Resp: CTAB, breathing comfortably on room air  Abd: Gravid, non-tender, non-distended  Ext: non-tender, trace edema     FHT: baseline 125, moderate variability, accelerations present, occasional subtle variables  [place]: Irritability     Assessment: Enid Cisneros is a 38 year old  @ 36w1d by 22w6d, admitted for recurrent bleeding in the setting of placenta previa.    Plan:    Marginal Placenta Previa complicated with 2nd episode of bleeding with additional bleeds during current hospitalization: currently stable. Episode of spotting  resolved.   - Last US  showed anterior marginal previa.  Vaginal delivery is contraindicated and at this gestational age, likelihood of migration of the placenta is low.   planned for  1030AM @ 37w0d.  Will not give rescue BMZ as she is >34wga.  - Growth US  revealed an anterior lower placenta margin adjacent to the internal os, 2032g 66%tile, transverse with head to maternal right,   JEWEL 15.9, MVP 6.4cm.  Repeat growth  showed appropriate interval growth, 91%st percentile.  Discussed with patient.  - s/p Beta -  - type and crossed x2, type and screen Q72h (next )   - CS consent signed  - No digital cervical exams  - Reg diet, make NPO if bleeding  - Hgb 11.4 on admission    Maternal hx of PACs/PVCs:  - EKG nml ,   - s/p 48h Holter monitor in   - currently asymptomatic, no medication    Fetal wellbeing:  - Fetal head maternal L, transverse on scan   - Reactive NST  - Continue TID monitoring  - NICU  consult completed 6/30    -PNC: Rh+/RI    -Dispo: Given recurrent vaginal bleeding  in context of known placenta previa, will plan continued inpatient management until delivery 7/26.     Plan continue monitoring spotting for now if heavy we will proceed with delivery     Paulo Pena    Time Spent on this Encounter   I, Paulo Pena, spent a total of 15 minutes bedside and on the inpatient unit today managing the care of Enid Cisneros.  Over 50% of my time on the unit was spent counseling the patient and /or coordinating care regarding pregnancy complicated by placenta previa with recurrent bleeding. See note for details.    Paulo Pena

## 2018-07-20 NOTE — PLAN OF CARE
Problem: Patient Care Overview  Goal: Plan of Care/Patient Progress Review  Outcome: No Change  Denies any VB, LOF, or pain this shift. Questions encouraged and answered regarding what to expect for  delivery (day of, prep, in the OR, recovery, etc.). Patient seems comfortable with plan of care. Looking forward to visit with her family tomorrow. Fetal oxygenation assumed adequate (see flowsheet). Pt reports no complaints. Will continue current plan of cares.

## 2018-07-20 NOTE — PLAN OF CARE
Problem: Patient Care Overview  Goal: Plan of Care/Patient Progress Review  Outcome: No Change    Data: Maternal status stable. Fetal assessment is reactive.   Action: Continue with plan of care, which is TID monitoring, with plan for c/s on 7/26. Patient up at liberation. Denies feeling contractyions.   Response: Patient coping well.

## 2018-07-20 NOTE — PLAN OF CARE
Problem: Bleeding, Second or Third Trimester (Adult,Obstetrics,Pediatric)  Goal: Signs and Symptoms of Listed Potential Problems Will be Absent, Minimized or Managed (Bleeding, Second or Third Trimester)  Signs and symptoms of listed potential problems will be absent, minimized or managed by discharge/transition of care (reference Bleeding, Second or Third Trimester (Adult,Obstetrics,Pediatric) CPG).   Outcome: No Change  Data: Maternal status is stable.  Denies cramping, ctx, LOF, or vaginal bleeding.  VSS.   Fetal assessment is AGA.   Action: Continue with plan of care. IV noted to be infiltrated on IV flush, new saline lock IV placed.  Discussed the day of  section with Enid.  She states she is nervous about surgery, but that being informed about the course of events surrounding  is helpful.  Discussed the option for use of the clear drape at delivery.  Patient states that she was initially uninterested in the clear drape as she thought that it would allow her to see the incision, blood, etc.  Writer explained to her that due to the location of the incision, position that she is lying in, and the location of the drapes, she would not be able to actually see the surgical site.  Informed her that at delivery the blue drape is temporarily dropped, baby is lifted up and brought closer to the drape (if able) so that mom can see and feel baby through the drape.  She was not ready to say she didn't want the clear drape nor that she wanted it.  Explained that this is optional and whatever she is most comfortable with on that day is what the team will do.    Response: Patient coping well.  Family coming to visit this evening.

## 2018-07-21 LAB
GP B STREP DNA SPEC QL NAA+PROBE: NEGATIVE
SPECIMEN SOURCE: NORMAL

## 2018-07-21 PROCEDURE — 36415 COLL VENOUS BLD VENIPUNCTURE: CPT | Performed by: STUDENT IN AN ORGANIZED HEALTH CARE EDUCATION/TRAINING PROGRAM

## 2018-07-21 PROCEDURE — 86900 BLOOD TYPING SEROLOGIC ABO: CPT | Performed by: STUDENT IN AN ORGANIZED HEALTH CARE EDUCATION/TRAINING PROGRAM

## 2018-07-21 PROCEDURE — 12000028 ZZH R&B OB UMMC

## 2018-07-21 PROCEDURE — 86850 RBC ANTIBODY SCREEN: CPT | Performed by: STUDENT IN AN ORGANIZED HEALTH CARE EDUCATION/TRAINING PROGRAM

## 2018-07-21 PROCEDURE — 86923 COMPATIBILITY TEST ELECTRIC: CPT | Performed by: STUDENT IN AN ORGANIZED HEALTH CARE EDUCATION/TRAINING PROGRAM

## 2018-07-21 PROCEDURE — 86901 BLOOD TYPING SEROLOGIC RH(D): CPT | Performed by: STUDENT IN AN ORGANIZED HEALTH CARE EDUCATION/TRAINING PROGRAM

## 2018-07-21 PROCEDURE — 25000132 ZZH RX MED GY IP 250 OP 250 PS 637: Performed by: INTERNAL MEDICINE

## 2018-07-21 RX ADMIN — PRENATAL VIT W/ FE FUMARATE-FA TAB 27-0.8 MG 1 TABLET: 27-0.8 TAB at 08:48

## 2018-07-21 NOTE — PLAN OF CARE
Enid Cisneros is stable, no complaints.   Denies pain, contractions, bloody show or active bleeding.   Vitals stable.   Will continue to monitor.

## 2018-07-21 NOTE — PLAN OF CARE
Problem: Patient Care Overview  Goal: Plan of Care/Patient Progress Review  Outcome: No Change  Data:  Maternal status stable. Denies cramping gordo, leaking fluid or bleeding. Fetal assessment is appropriate for gestational age. Pt reports active fetal movement.   Action: Continue with plan of care, which is TID monitoring. Patient encouraged to move extremities while in bed.  Response: Patient coping well.

## 2018-07-21 NOTE — PLAN OF CARE
Problem: Patient Care Overview  Goal: Plan of Care/Patient Progress Review  Outcome: No Change  VSS. Afebrile. Patient denies contractions, cramping, pelvic pressure, back pain, LOF, vaginal bleeding, headache, vision changes, epigastric pain, increased swelling and generalized pain. Good FM. No contractions noted on [place]. FHT's appropriate for gestational age. Patient's family came to visit [facility]. Patient has no complaints at this time. Continue with current POC.

## 2018-07-21 NOTE — PROGRESS NOTES
[facility] Antepartum Progress Note    Subjective: Enid is doing well this morning. Denies any bleeding or spotting. No contractions, cramping, or pressure. No CP or SOB, no calf tenderness or swelling.    Objective:   BP 96/55  Pulse 90  Temp 97.9  F (36.6  C) (Oral)  Resp 16  Wt 99.9 kg (220 lb 3.2 oz)  LMP 2017 (Exact Date)  BMI 34.49 kg/m2    Gen: Resting comfortably in bed, NAD  CV: RRR  Resp: CTAB, breathing comfortably on room air  Abd: Gravid, non-tender, non-distended  Ext: non-tender, trace edema     FHT: baseline 130, moderate variability, accelerations present, no decels  [place]: Irritability     Assessment: Enid Cisneros is a 38 year old  @ 36w1d by 22w6d, admitted for recurrent bleeding in the setting of placenta previa.    Plan:    Marginal Placenta Previa complicated with 2nd episode of bleeding with additional bleeds during current hospitalization: currently stable. Episode of spotting  resolved.   - Last US  showed anterior marginal previa.  Vaginal delivery is contraindicated and at this gestational age, likelihood of migration of the placenta is low.   planned for  1030AM @ 37w0d.  Will not give rescue BMZ as she is >34wga.  - Growth US  revealed an anterior lower placenta margin adjacent to the internal os, 2032g 66%tile, transverse with head to maternal right,   JEWEL 15.9, MVP 6.4cm.  Repeat growth  showed appropriate interval growth, 91%st percentile.  Discussed with patient.  - s/p Beta -20  - type and crossed x2, type and screen Q72h (next )   - CS consent signed  - No digital cervical exams  - Reg diet, make NPO if bleeding  - Hgb 11.4 on admission    Maternal hx of PACs/PVCs:  - EKG nml ,   - s/p 48h Holter monitor in   - currently asymptomatic, no medication    Fetal wellbeing:  - Fetal head maternal L, transverse on scan   - Reactive NST  - Continue TID monitoring  - NICU consult completed     -PNC: Rh+/RI    -Dispo: Given  recurrent vaginal bleeding  in context of known placenta previa, will plan continued inpatient management until delivery 7/26.     Plan continue monitoring spotting for now if heavy we will proceed with delivery     Sergo Jimenez MD  OB/GYN Resident, PGY-2  7/21/2018

## 2018-07-22 PROCEDURE — 25000132 ZZH RX MED GY IP 250 OP 250 PS 637: Performed by: INTERNAL MEDICINE

## 2018-07-22 PROCEDURE — 12000028 ZZH R&B OB UMMC

## 2018-07-22 PROCEDURE — 25000128 H RX IP 250 OP 636: Performed by: INTERNAL MEDICINE

## 2018-07-22 RX ADMIN — SODIUM CHLORIDE, POTASSIUM CHLORIDE, SODIUM LACTATE AND CALCIUM CHLORIDE 500 ML: 600; 310; 30; 20 INJECTION, SOLUTION INTRAVENOUS at 21:07

## 2018-07-22 RX ADMIN — PRENATAL VIT W/ FE FUMARATE-FA TAB 27-0.8 MG 1 TABLET: 27-0.8 TAB at 07:33

## 2018-07-22 NOTE — PLAN OF CARE
Problem: Patient Care Overview  Goal: Plan of Care/Patient Progress Review  Outcome: No Change  Data: Maternal status is stable.  VSS.  Pt denies cramping, ctx, pain, LOF, or vaginal bleeding. Fetal assessment is AGA and reactive.  Pt reports active fetal movement.  Action: Continue with plan of care, which is TID monitoring.  Response: Patient coping well.  Spouse and children visiting today.  Patient out on wheelchair ride with family.

## 2018-07-22 NOTE — PLAN OF CARE
Problem: Patient Care Overview  Goal: Plan of Care/Patient Progress Review  Outcome: No Change  Data:  Maternal status stable. Denies cramping gordo, leaking fluid or bleeding. Fetal assessment is appropriate for gestational age and reactive. Pt reports active fetal movement.   Action: Continue with plan of care, which is TID monitoring. Patient encouraged to move extremities while in bed.  Response: Patient coping well.

## 2018-07-22 NOTE — PROGRESS NOTES
[facility] Antepartum Progress Note    Subjective: Patient reports she is feeling well.  She denies contractions, cramping or pressure.  She has not had bleeding or brown discharge.  She is ambulating.  She denies chest pain, shortness of breath, leg pain or swelling.    Objective:   Vitals:    18 2330 18 0730 18 2350 18 0730   BP: 92/50 96/55 103/58 99/54   Pulse:       Resp: 16 16 16 16   Temp: 97.8  F (36.6  C) 97.9  F (36.6  C) 98  F (36.7  C) 97.6  F (36.4  C)   TempSrc: Oral Oral Oral Oral   Weight:         Gen: Resting comfortably in bed, NAD  CV: RRR  Resp: CTAB, breathing comfortably on room air  Abd: Gravid, non-tender, non-distended  Ext: non-tender, trace pedal edema     FHT: baseline 130, moderate variability, accelerations present, no decels  Pender: no contractions    Assessment: Enid Cisneros is a 38 year old  @ 36w3d by 22w6d US, admitted for recurrent bleeding in the setting of placenta previa.    Plan:    Marginal Placenta Previa complicated with 2nd episode of bleeding with additional bleeds during current hospitalization: currently stable. Episode of spotting  resolved.   - Last US  showed anterior marginal previa.  Vaginal delivery is contraindicated and at this gestational age, likelihood of migration of the placenta is low.   planned for  1030AM @ 37w0d.  Will not give rescue BMZ as she is >34wga.  - Growth US  revealed an anterior lower placenta margin adjacent to the internal os, 2032g 66%tile, transverse with head to maternal right,   JEWEL 15.9, MVP 6.4cm.  Repeat growth  showed appropriate interval growth, 91%st percentile.  Discussed with patient.  - s/p Beta -  - type and crossed x2, type and screen Q72h (next )   - CS consent signed  - No digital cervical exams  - Reg diet, make NPO if bleeding  - Hgb 11.4 on admission    Maternal hx of PACs/PVCs:  - EKG nml ,   - s/p 48h Holter monitor in   - currently  asymptomatic, no medication    Fetal wellbeing:  - Fetal head maternal L, transverse on scan 6/30  - Reactive NST  - Continue TID monitoring  -  Repeat growth 7/17 showed appropriate interval growth, 91%st percentile.  - NICU consult completed 6/30    -PNC: Rh+/RI    -Dispo: Given recurrent vaginal bleeding  in context of known placenta previa, will plan continued inpatient management until delivery 7/26.     Mel Campos MD  PGY-3 OB/GYN  [phone] (OB G3 Pager)

## 2018-07-23 ENCOUNTER — ANESTHESIA EVENT (OUTPATIENT)
Dept: OBGYN | Facility: CLINIC | Age: 38
End: 2018-07-23
Payer: COMMERCIAL

## 2018-07-23 ENCOUNTER — SURGERY (OUTPATIENT)
Age: 38
End: 2018-07-23

## 2018-07-23 ENCOUNTER — ANESTHESIA (OUTPATIENT)
Dept: OBGYN | Facility: CLINIC | Age: 38
End: 2018-07-23
Payer: COMMERCIAL

## 2018-07-23 PROBLEM — Z98.891 S/P C-SECTION: Status: ACTIVE | Noted: 2018-07-23

## 2018-07-23 LAB
ABO + RH BLD: NORMAL
ABO + RH BLD: NORMAL
BLD GP AB SCN SERPL QL: NORMAL
BLD PROD TYP BPU: NORMAL
BLD UNIT ID BPU: 0
BLD UNIT ID BPU: 0
BLOOD BANK CMNT PATIENT-IMP: NORMAL
BLOOD PRODUCT CODE: NORMAL
BLOOD PRODUCT CODE: NORMAL
BPU ID: NORMAL
BPU ID: NORMAL
ERYTHROCYTE [DISTWIDTH] IN BLOOD BY AUTOMATED COUNT: 13.7 % (ref 10–15)
HCT VFR BLD AUTO: 37.4 % (ref 35–47)
HGB BLD-MCNC: 12 G/DL (ref 11.7–15.7)
MCH RBC QN AUTO: 29.1 PG (ref 26.5–33)
MCHC RBC AUTO-ENTMCNC: 32.1 G/DL (ref 31.5–36.5)
MCV RBC AUTO: 91 FL (ref 78–100)
NUM BPU REQUESTED: 2
PLATELET # BLD AUTO: 183 10E9/L (ref 150–450)
RBC # BLD AUTO: 4.13 10E12/L (ref 3.8–5.2)
SPECIMEN EXP DATE BLD: NORMAL
TRANSFUSION STATUS PATIENT QL: NORMAL
WBC # BLD AUTO: 12.6 10E9/L (ref 4–11)

## 2018-07-23 PROCEDURE — 85027 COMPLETE CBC AUTOMATED: CPT | Performed by: OBSTETRICS & GYNECOLOGY

## 2018-07-23 PROCEDURE — 88307 TISSUE EXAM BY PATHOLOGIST: CPT | Mod: 26 | Performed by: OBSTETRICS & GYNECOLOGY

## 2018-07-23 PROCEDURE — 36000059 ZZH SURGERY LEVEL 3 EA 15 ADDTL MIN UMMC: Performed by: OBSTETRICS & GYNECOLOGY

## 2018-07-23 PROCEDURE — 71000014 ZZH RECOVERY PHASE 1 LEVEL 2 FIRST HR: Performed by: OBSTETRICS & GYNECOLOGY

## 2018-07-23 PROCEDURE — C9290 INJ, BUPIVACAINE LIPOSOME: HCPCS | Performed by: ANESTHESIOLOGY

## 2018-07-23 PROCEDURE — 25000125 ZZHC RX 250: Performed by: STUDENT IN AN ORGANIZED HEALTH CARE EDUCATION/TRAINING PROGRAM

## 2018-07-23 PROCEDURE — 37000008 ZZH ANESTHESIA TECHNICAL FEE, 1ST 30 MIN: Performed by: OBSTETRICS & GYNECOLOGY

## 2018-07-23 PROCEDURE — 40000170 ZZH STATISTIC PRE-PROCEDURE ASSESSMENT II: Performed by: OBSTETRICS & GYNECOLOGY

## 2018-07-23 PROCEDURE — 88307 TISSUE EXAM BY PATHOLOGIST: CPT | Performed by: OBSTETRICS & GYNECOLOGY

## 2018-07-23 PROCEDURE — 3E0T3BZ INTRODUCTION OF ANESTHETIC AGENT INTO PERIPHERAL NERVES AND PLEXI, PERCUTANEOUS APPROACH: ICD-10-PCS | Performed by: OBSTETRICS & GYNECOLOGY

## 2018-07-23 PROCEDURE — 25000128 H RX IP 250 OP 636: Performed by: ANESTHESIOLOGY

## 2018-07-23 PROCEDURE — 25000128 H RX IP 250 OP 636: Performed by: STUDENT IN AN ORGANIZED HEALTH CARE EDUCATION/TRAINING PROGRAM

## 2018-07-23 PROCEDURE — 25000128 H RX IP 250 OP 636: Performed by: OBSTETRICS & GYNECOLOGY

## 2018-07-23 PROCEDURE — 27210794 ZZH OR GENERAL SUPPLY STERILE: Performed by: OBSTETRICS & GYNECOLOGY

## 2018-07-23 PROCEDURE — 37000009 ZZH ANESTHESIA TECHNICAL FEE, EACH ADDTL 15 MIN: Performed by: OBSTETRICS & GYNECOLOGY

## 2018-07-23 PROCEDURE — 27110028 ZZH OR GENERAL SUPPLY NON-STERILE: Performed by: OBSTETRICS & GYNECOLOGY

## 2018-07-23 PROCEDURE — 25000132 ZZH RX MED GY IP 250 OP 250 PS 637: Performed by: INTERNAL MEDICINE

## 2018-07-23 PROCEDURE — 12000032 ZZH R&B OB CRITICAL UMMC

## 2018-07-23 PROCEDURE — 25000132 ZZH RX MED GY IP 250 OP 250 PS 637: Performed by: OBSTETRICS & GYNECOLOGY

## 2018-07-23 PROCEDURE — 25000132 ZZH RX MED GY IP 250 OP 250 PS 637

## 2018-07-23 PROCEDURE — P9016 RBC LEUKOCYTES REDUCED: HCPCS | Performed by: STUDENT IN AN ORGANIZED HEALTH CARE EDUCATION/TRAINING PROGRAM

## 2018-07-23 PROCEDURE — 36000057 ZZH SURGERY LEVEL 3 1ST 30 MIN - UMMC: Performed by: OBSTETRICS & GYNECOLOGY

## 2018-07-23 PROCEDURE — 25000125 ZZHC RX 250: Performed by: OBSTETRICS & GYNECOLOGY

## 2018-07-23 PROCEDURE — 36415 COLL VENOUS BLD VENIPUNCTURE: CPT | Performed by: OBSTETRICS & GYNECOLOGY

## 2018-07-23 PROCEDURE — C1765 ADHESION BARRIER: HCPCS | Performed by: OBSTETRICS & GYNECOLOGY

## 2018-07-23 RX ORDER — BUPIVACAINE HYDROCHLORIDE 7.5 MG/ML
INJECTION, SOLUTION INTRASPINAL PRN
Status: DISCONTINUED | OUTPATIENT
Start: 2018-07-23 | End: 2018-07-23

## 2018-07-23 RX ORDER — SODIUM CHLORIDE, SODIUM LACTATE, POTASSIUM CHLORIDE, CALCIUM CHLORIDE 600; 310; 30; 20 MG/100ML; MG/100ML; MG/100ML; MG/100ML
INJECTION, SOLUTION INTRAVENOUS CONTINUOUS PRN
Status: DISCONTINUED | OUTPATIENT
Start: 2018-07-23 | End: 2018-07-23

## 2018-07-23 RX ORDER — METOCLOPRAMIDE HYDROCHLORIDE 5 MG/ML
10 INJECTION INTRAMUSCULAR; INTRAVENOUS EVERY 6 HOURS PRN
Status: DISCONTINUED | OUTPATIENT
Start: 2018-07-23 | End: 2018-07-26 | Stop reason: HOSPADM

## 2018-07-23 RX ORDER — OXYTOCIN 10 [USP'U]/ML
10 INJECTION, SOLUTION INTRAMUSCULAR; INTRAVENOUS
Status: DISCONTINUED | OUTPATIENT
Start: 2018-07-23 | End: 2018-07-26 | Stop reason: HOSPADM

## 2018-07-23 RX ORDER — AMOXICILLIN 250 MG
1 CAPSULE ORAL 2 TIMES DAILY PRN
Status: DISCONTINUED | OUTPATIENT
Start: 2018-07-23 | End: 2018-07-23

## 2018-07-23 RX ORDER — BISACODYL 10 MG
10 SUPPOSITORY, RECTAL RECTAL DAILY PRN
Status: DISCONTINUED | OUTPATIENT
Start: 2018-07-25 | End: 2018-07-23

## 2018-07-23 RX ORDER — OXYTOCIN/0.9 % SODIUM CHLORIDE 30/500 ML
100 PLASTIC BAG, INJECTION (ML) INTRAVENOUS CONTINUOUS
Status: DISCONTINUED | OUTPATIENT
Start: 2018-07-23 | End: 2018-07-26 | Stop reason: HOSPADM

## 2018-07-23 RX ORDER — NALOXONE HYDROCHLORIDE 0.4 MG/ML
.1-.4 INJECTION, SOLUTION INTRAMUSCULAR; INTRAVENOUS; SUBCUTANEOUS
Status: DISCONTINUED | OUTPATIENT
Start: 2018-07-23 | End: 2018-07-26 | Stop reason: HOSPADM

## 2018-07-23 RX ORDER — ACETAMINOPHEN 325 MG/1
975 TABLET ORAL EVERY 8 HOURS
Status: DISCONTINUED | OUTPATIENT
Start: 2018-07-23 | End: 2018-07-26 | Stop reason: HOSPADM

## 2018-07-23 RX ORDER — OXYTOCIN/0.9 % SODIUM CHLORIDE 30/500 ML
PLASTIC BAG, INJECTION (ML) INTRAVENOUS
Status: DISCONTINUED
Start: 2018-07-23 | End: 2018-07-23 | Stop reason: HOSPADM

## 2018-07-23 RX ORDER — NALOXONE HYDROCHLORIDE 0.4 MG/ML
.1-.4 INJECTION, SOLUTION INTRAMUSCULAR; INTRAVENOUS; SUBCUTANEOUS
Status: DISCONTINUED | OUTPATIENT
Start: 2018-07-23 | End: 2018-07-23

## 2018-07-23 RX ORDER — BUPIVACAINE HYDROCHLORIDE 7.5 MG/ML
INJECTION, SOLUTION EPIDURAL; RETROBULBAR
Status: DISCONTINUED
Start: 2018-07-23 | End: 2018-07-23 | Stop reason: HOSPADM

## 2018-07-23 RX ORDER — ONDANSETRON 4 MG/1
4 TABLET, ORALLY DISINTEGRATING ORAL EVERY 30 MIN PRN
Status: DISCONTINUED | OUTPATIENT
Start: 2018-07-23 | End: 2018-07-26 | Stop reason: HOSPADM

## 2018-07-23 RX ORDER — AMOXICILLIN 250 MG
1 CAPSULE ORAL 2 TIMES DAILY PRN
Status: DISCONTINUED | OUTPATIENT
Start: 2018-07-23 | End: 2018-07-26 | Stop reason: HOSPADM

## 2018-07-23 RX ORDER — SODIUM CHLORIDE, SODIUM LACTATE, POTASSIUM CHLORIDE, CALCIUM CHLORIDE 600; 310; 30; 20 MG/100ML; MG/100ML; MG/100ML; MG/100ML
INJECTION, SOLUTION INTRAVENOUS CONTINUOUS
Status: DISCONTINUED | OUTPATIENT
Start: 2018-07-23 | End: 2018-07-26 | Stop reason: HOSPADM

## 2018-07-23 RX ORDER — OXYCODONE HYDROCHLORIDE 5 MG/1
5-10 TABLET ORAL
Status: DISCONTINUED | OUTPATIENT
Start: 2018-07-23 | End: 2018-07-23

## 2018-07-23 RX ORDER — IBUPROFEN 800 MG/1
800 TABLET, FILM COATED ORAL EVERY 6 HOURS PRN
Status: DISCONTINUED | OUTPATIENT
Start: 2018-07-23 | End: 2018-07-26 | Stop reason: HOSPADM

## 2018-07-23 RX ORDER — SIMETHICONE 80 MG
80 TABLET,CHEWABLE ORAL 4 TIMES DAILY PRN
Status: DISCONTINUED | OUTPATIENT
Start: 2018-07-23 | End: 2018-07-26 | Stop reason: HOSPADM

## 2018-07-23 RX ORDER — HYDROMORPHONE HYDROCHLORIDE 1 MG/ML
.3-.5 INJECTION, SOLUTION INTRAMUSCULAR; INTRAVENOUS; SUBCUTANEOUS EVERY 5 MIN PRN
Status: DISCONTINUED | OUTPATIENT
Start: 2018-07-23 | End: 2018-07-23 | Stop reason: HOSPADM

## 2018-07-23 RX ORDER — KETOROLAC TROMETHAMINE 30 MG/ML
INJECTION, SOLUTION INTRAMUSCULAR; INTRAVENOUS PRN
Status: DISCONTINUED | OUTPATIENT
Start: 2018-07-23 | End: 2018-07-23

## 2018-07-23 RX ORDER — CITRIC ACID/SODIUM CITRATE 334-500MG
SOLUTION, ORAL ORAL
Status: COMPLETED
Start: 2018-07-23 | End: 2018-07-23

## 2018-07-23 RX ORDER — FENTANYL CITRATE 50 UG/ML
25-50 INJECTION, SOLUTION INTRAMUSCULAR; INTRAVENOUS
Status: DISCONTINUED | OUTPATIENT
Start: 2018-07-23 | End: 2018-07-26 | Stop reason: HOSPADM

## 2018-07-23 RX ORDER — ONDANSETRON 2 MG/ML
4 INJECTION INTRAMUSCULAR; INTRAVENOUS EVERY 30 MIN PRN
Status: DISCONTINUED | OUTPATIENT
Start: 2018-07-23 | End: 2018-07-23 | Stop reason: HOSPADM

## 2018-07-23 RX ORDER — LANOLIN 100 %
OINTMENT (GRAM) TOPICAL
Status: DISCONTINUED | OUTPATIENT
Start: 2018-07-23 | End: 2018-07-23

## 2018-07-23 RX ORDER — DIPHENHYDRAMINE HYDROCHLORIDE 50 MG/ML
25 INJECTION INTRAMUSCULAR; INTRAVENOUS EVERY 6 HOURS PRN
Status: DISCONTINUED | OUTPATIENT
Start: 2018-07-23 | End: 2018-07-23

## 2018-07-23 RX ORDER — MORPHINE SULFATE 1 MG/ML
INJECTION, SOLUTION EPIDURAL; INTRATHECAL; INTRAVENOUS
Status: DISCONTINUED
Start: 2018-07-23 | End: 2018-07-23 | Stop reason: HOSPADM

## 2018-07-23 RX ORDER — MISOPROSTOL 200 UG/1
400 TABLET ORAL
Status: DISCONTINUED | OUTPATIENT
Start: 2018-07-23 | End: 2018-07-26 | Stop reason: HOSPADM

## 2018-07-23 RX ORDER — MORPHINE SULFATE 1 MG/ML
INJECTION, SOLUTION EPIDURAL; INTRATHECAL; INTRAVENOUS PRN
Status: DISCONTINUED | OUTPATIENT
Start: 2018-07-23 | End: 2018-07-23

## 2018-07-23 RX ORDER — ONDANSETRON 2 MG/ML
INJECTION INTRAMUSCULAR; INTRAVENOUS PRN
Status: DISCONTINUED | OUTPATIENT
Start: 2018-07-23 | End: 2018-07-23

## 2018-07-23 RX ORDER — DEXTROSE, SODIUM CHLORIDE, SODIUM LACTATE, POTASSIUM CHLORIDE, AND CALCIUM CHLORIDE 5; .6; .31; .03; .02 G/100ML; G/100ML; G/100ML; G/100ML; G/100ML
INJECTION, SOLUTION INTRAVENOUS CONTINUOUS
Status: DISCONTINUED | OUTPATIENT
Start: 2018-07-23 | End: 2018-07-26 | Stop reason: HOSPADM

## 2018-07-23 RX ORDER — ONDANSETRON 4 MG/1
4 TABLET, ORALLY DISINTEGRATING ORAL EVERY 30 MIN PRN
Status: DISCONTINUED | OUTPATIENT
Start: 2018-07-23 | End: 2018-07-23 | Stop reason: HOSPADM

## 2018-07-23 RX ORDER — DIPHENHYDRAMINE HCL 25 MG
25 CAPSULE ORAL EVERY 6 HOURS PRN
Status: DISCONTINUED | OUTPATIENT
Start: 2018-07-23 | End: 2018-07-26 | Stop reason: HOSPADM

## 2018-07-23 RX ORDER — NALBUPHINE HYDROCHLORIDE 10 MG/ML
2.5-5 INJECTION, SOLUTION INTRAMUSCULAR; INTRAVENOUS; SUBCUTANEOUS EVERY 6 HOURS PRN
Status: DISCONTINUED | OUTPATIENT
Start: 2018-07-23 | End: 2018-07-26 | Stop reason: HOSPADM

## 2018-07-23 RX ORDER — ACETAMINOPHEN 325 MG/1
975 TABLET ORAL EVERY 8 HOURS
Status: DISCONTINUED | OUTPATIENT
Start: 2018-07-23 | End: 2018-07-23

## 2018-07-23 RX ORDER — OXYTOCIN/0.9 % SODIUM CHLORIDE 30/500 ML
340 PLASTIC BAG, INJECTION (ML) INTRAVENOUS CONTINUOUS PRN
Status: DISCONTINUED | OUTPATIENT
Start: 2018-07-23 | End: 2018-07-26 | Stop reason: HOSPADM

## 2018-07-23 RX ORDER — LIDOCAINE 40 MG/G
CREAM TOPICAL
Status: DISCONTINUED | OUTPATIENT
Start: 2018-07-23 | End: 2018-07-26 | Stop reason: HOSPADM

## 2018-07-23 RX ORDER — HYDROCORTISONE 2.5 %
CREAM (GRAM) TOPICAL 3 TIMES DAILY PRN
Status: DISCONTINUED | OUTPATIENT
Start: 2018-07-23 | End: 2018-07-23

## 2018-07-23 RX ORDER — IBUPROFEN 600 MG/1
600 TABLET, FILM COATED ORAL EVERY 6 HOURS PRN
Status: DISCONTINUED | OUTPATIENT
Start: 2018-07-23 | End: 2018-07-26 | Stop reason: HOSPADM

## 2018-07-23 RX ORDER — IBUPROFEN 400 MG/1
400 TABLET, FILM COATED ORAL EVERY 6 HOURS PRN
Status: DISCONTINUED | OUTPATIENT
Start: 2018-07-23 | End: 2018-07-26 | Stop reason: HOSPADM

## 2018-07-23 RX ORDER — DIPHENHYDRAMINE HCL 25 MG
25 CAPSULE ORAL EVERY 6 HOURS PRN
Status: DISCONTINUED | OUTPATIENT
Start: 2018-07-23 | End: 2018-07-23

## 2018-07-23 RX ORDER — HYDROMORPHONE HYDROCHLORIDE 1 MG/ML
.3-.5 INJECTION, SOLUTION INTRAMUSCULAR; INTRAVENOUS; SUBCUTANEOUS EVERY 30 MIN PRN
Status: DISCONTINUED | OUTPATIENT
Start: 2018-07-23 | End: 2018-07-26 | Stop reason: HOSPADM

## 2018-07-23 RX ORDER — SODIUM CHLORIDE, SODIUM LACTATE, POTASSIUM CHLORIDE, CALCIUM CHLORIDE 600; 310; 30; 20 MG/100ML; MG/100ML; MG/100ML; MG/100ML
INJECTION, SOLUTION INTRAVENOUS CONTINUOUS
Status: DISCONTINUED | OUTPATIENT
Start: 2018-07-23 | End: 2018-07-23 | Stop reason: HOSPADM

## 2018-07-23 RX ORDER — AMOXICILLIN 250 MG
2 CAPSULE ORAL 2 TIMES DAILY PRN
Status: DISCONTINUED | OUTPATIENT
Start: 2018-07-23 | End: 2018-07-26 | Stop reason: HOSPADM

## 2018-07-23 RX ORDER — HYDROCORTISONE 2.5 %
CREAM (GRAM) TOPICAL 3 TIMES DAILY PRN
Status: DISCONTINUED | OUTPATIENT
Start: 2018-07-23 | End: 2018-07-26 | Stop reason: HOSPADM

## 2018-07-23 RX ORDER — DIPHENHYDRAMINE HYDROCHLORIDE 50 MG/ML
25 INJECTION INTRAMUSCULAR; INTRAVENOUS EVERY 6 HOURS PRN
Status: DISCONTINUED | OUTPATIENT
Start: 2018-07-23 | End: 2018-07-26 | Stop reason: HOSPADM

## 2018-07-23 RX ORDER — EPHEDRINE SULFATE 50 MG/ML
5 INJECTION, SOLUTION INTRAMUSCULAR; INTRAVENOUS; SUBCUTANEOUS
Status: DISCONTINUED | OUTPATIENT
Start: 2018-07-23 | End: 2018-07-26 | Stop reason: HOSPADM

## 2018-07-23 RX ORDER — FENTANYL CITRATE 50 UG/ML
25-50 INJECTION, SOLUTION INTRAMUSCULAR; INTRAVENOUS
Status: DISCONTINUED | OUTPATIENT
Start: 2018-07-23 | End: 2018-07-23 | Stop reason: HOSPADM

## 2018-07-23 RX ORDER — ONDANSETRON 2 MG/ML
4 INJECTION INTRAMUSCULAR; INTRAVENOUS EVERY 6 HOURS PRN
Status: DISCONTINUED | OUTPATIENT
Start: 2018-07-23 | End: 2018-07-23

## 2018-07-23 RX ORDER — KETOROLAC TROMETHAMINE 30 MG/ML
15 INJECTION, SOLUTION INTRAMUSCULAR; INTRAVENOUS EVERY 6 HOURS
Status: DISCONTINUED | OUTPATIENT
Start: 2018-07-23 | End: 2018-07-23

## 2018-07-23 RX ORDER — LIDOCAINE 40 MG/G
CREAM TOPICAL
Status: DISCONTINUED | OUTPATIENT
Start: 2018-07-23 | End: 2018-07-23

## 2018-07-23 RX ORDER — LIDOCAINE 40 MG/G
CREAM TOPICAL
Status: DISCONTINUED | OUTPATIENT
Start: 2018-07-23 | End: 2018-07-23 | Stop reason: HOSPADM

## 2018-07-23 RX ORDER — LANOLIN 100 %
OINTMENT (GRAM) TOPICAL
Status: DISCONTINUED | OUTPATIENT
Start: 2018-07-23 | End: 2018-07-26 | Stop reason: HOSPADM

## 2018-07-23 RX ORDER — ACETAMINOPHEN 325 MG/1
650 TABLET ORAL EVERY 4 HOURS PRN
Status: DISCONTINUED | OUTPATIENT
Start: 2018-07-26 | End: 2018-07-23

## 2018-07-23 RX ORDER — ONDANSETRON 2 MG/ML
4 INJECTION INTRAMUSCULAR; INTRAVENOUS EVERY 30 MIN PRN
Status: DISCONTINUED | OUTPATIENT
Start: 2018-07-23 | End: 2018-07-26 | Stop reason: HOSPADM

## 2018-07-23 RX ORDER — CEFAZOLIN SODIUM 2 G/100ML
2 INJECTION, SOLUTION INTRAVENOUS
Status: DISCONTINUED | OUTPATIENT
Start: 2018-07-23 | End: 2018-07-23 | Stop reason: HOSPADM

## 2018-07-23 RX ORDER — OXYTOCIN/0.9 % SODIUM CHLORIDE 30/500 ML
PLASTIC BAG, INJECTION (ML) INTRAVENOUS CONTINUOUS PRN
Status: DISCONTINUED | OUTPATIENT
Start: 2018-07-23 | End: 2018-07-23

## 2018-07-23 RX ORDER — AMOXICILLIN 250 MG
2 CAPSULE ORAL 2 TIMES DAILY PRN
Status: DISCONTINUED | OUTPATIENT
Start: 2018-07-23 | End: 2018-07-23

## 2018-07-23 RX ORDER — ONDANSETRON 2 MG/ML
4 INJECTION INTRAMUSCULAR; INTRAVENOUS EVERY 6 HOURS PRN
Status: DISCONTINUED | OUTPATIENT
Start: 2018-07-23 | End: 2018-07-26 | Stop reason: HOSPADM

## 2018-07-23 RX ORDER — BUPIVACAINE HYDROCHLORIDE AND EPINEPHRINE 2.5; 5 MG/ML; UG/ML
INJECTION, SOLUTION INFILTRATION; PERINEURAL PRN
Status: DISCONTINUED | OUTPATIENT
Start: 2018-07-23 | End: 2018-07-23

## 2018-07-23 RX ORDER — SIMETHICONE 80 MG
80 TABLET,CHEWABLE ORAL 4 TIMES DAILY PRN
Status: DISCONTINUED | OUTPATIENT
Start: 2018-07-23 | End: 2018-07-23

## 2018-07-23 RX ORDER — KETOROLAC TROMETHAMINE 30 MG/ML
30 INJECTION, SOLUTION INTRAMUSCULAR; INTRAVENOUS EVERY 6 HOURS
Status: DISPENSED | OUTPATIENT
Start: 2018-07-23 | End: 2018-07-24

## 2018-07-23 RX ORDER — BISACODYL 10 MG
10 SUPPOSITORY, RECTAL RECTAL DAILY PRN
Status: DISCONTINUED | OUTPATIENT
Start: 2018-07-25 | End: 2018-07-26 | Stop reason: HOSPADM

## 2018-07-23 RX ORDER — OXYTOCIN/0.9 % SODIUM CHLORIDE 30/500 ML
100 PLASTIC BAG, INJECTION (ML) INTRAVENOUS CONTINUOUS
Status: DISCONTINUED | OUTPATIENT
Start: 2018-07-23 | End: 2018-07-23

## 2018-07-23 RX ORDER — DEXTROSE, SODIUM CHLORIDE, SODIUM LACTATE, POTASSIUM CHLORIDE, AND CALCIUM CHLORIDE 5; .6; .31; .03; .02 G/100ML; G/100ML; G/100ML; G/100ML; G/100ML
INJECTION, SOLUTION INTRAVENOUS CONTINUOUS
Status: DISCONTINUED | OUTPATIENT
Start: 2018-07-23 | End: 2018-07-23

## 2018-07-23 RX ORDER — CITRIC ACID/SODIUM CITRATE 334-500MG
30 SOLUTION, ORAL ORAL
Status: COMPLETED | OUTPATIENT
Start: 2018-07-23 | End: 2018-07-23

## 2018-07-23 RX ORDER — FENTANYL CITRATE 50 UG/ML
INJECTION, SOLUTION INTRAMUSCULAR; INTRAVENOUS PRN
Status: DISCONTINUED | OUTPATIENT
Start: 2018-07-23 | End: 2018-07-23

## 2018-07-23 RX ORDER — ACETAMINOPHEN 325 MG/1
650 TABLET ORAL EVERY 4 HOURS PRN
Status: DISCONTINUED | OUTPATIENT
Start: 2018-07-26 | End: 2018-07-26 | Stop reason: HOSPADM

## 2018-07-23 RX ORDER — CEFAZOLIN SODIUM 1 G/3ML
INJECTION, POWDER, FOR SOLUTION INTRAMUSCULAR; INTRAVENOUS PRN
Status: DISCONTINUED | OUTPATIENT
Start: 2018-07-23 | End: 2018-07-23

## 2018-07-23 RX ORDER — OXYCODONE HYDROCHLORIDE 5 MG/1
5-10 TABLET ORAL
Status: DISCONTINUED | OUTPATIENT
Start: 2018-07-23 | End: 2018-07-26 | Stop reason: HOSPADM

## 2018-07-23 RX ADMIN — MORPHINE SULFATE 0.1 MG: 1 INJECTION, SOLUTION EPIDURAL; INTRATHECAL; INTRAVENOUS at 15:40

## 2018-07-23 RX ADMIN — ACETAMINOPHEN 975 MG: 325 TABLET, FILM COATED ORAL at 20:50

## 2018-07-23 RX ADMIN — SODIUM CHLORIDE, POTASSIUM CHLORIDE, SODIUM LACTATE AND CALCIUM CHLORIDE: 600; 310; 30; 20 INJECTION, SOLUTION INTRAVENOUS at 15:25

## 2018-07-23 RX ADMIN — FENTANYL CITRATE 10 MCG: 50 INJECTION, SOLUTION INTRAMUSCULAR; INTRAVENOUS at 15:40

## 2018-07-23 RX ADMIN — OXYTOCIN-SODIUM CHLORIDE 0.9% IV SOLN 30 UNIT/500ML 100 ML/HR: 30-0.9/5 SOLUTION at 20:19

## 2018-07-23 RX ADMIN — PHENYLEPHRINE HYDROCHLORIDE 0.4 MCG/KG/MIN: 10 INJECTION, SOLUTION INTRAMUSCULAR; INTRAVENOUS; SUBCUTANEOUS at 15:40

## 2018-07-23 RX ADMIN — OXYTOCIN-SODIUM CHLORIDE 0.9% IV SOLN 30 UNIT/500ML 300 ML/HR: 30-0.9/5 SOLUTION at 16:00

## 2018-07-23 RX ADMIN — CEFAZOLIN 2 G: 1 INJECTION, POWDER, FOR SOLUTION INTRAMUSCULAR; INTRAVENOUS at 15:45

## 2018-07-23 RX ADMIN — BUPIVACAINE HYDROCHLORIDE IN DEXTROSE 1.7 ML: 7.5 INJECTION, SOLUTION SUBARACHNOID at 15:40

## 2018-07-23 RX ADMIN — KETOROLAC TROMETHAMINE 30 MG: 30 INJECTION, SOLUTION INTRAMUSCULAR at 22:39

## 2018-07-23 RX ADMIN — SODIUM CITRATE AND CITRIC ACID MONOHYDRATE 30 ML: 500; 334 SOLUTION ORAL at 15:10

## 2018-07-23 RX ADMIN — ONDANSETRON 4 MG: 2 INJECTION INTRAMUSCULAR; INTRAVENOUS at 16:23

## 2018-07-23 RX ADMIN — PRENATAL VIT W/ FE FUMARATE-FA TAB 27-0.8 MG 1 TABLET: 27-0.8 TAB at 07:35

## 2018-07-23 RX ADMIN — KETOROLAC TROMETHAMINE 30 MG: 30 INJECTION, SOLUTION INTRAMUSCULAR at 16:30

## 2018-07-23 RX ADMIN — BUPIVACAINE HYDROCHLORIDE AND EPINEPHRINE BITARTRATE 20 ML: 2.5; .005 INJECTION, SOLUTION INFILTRATION; PERINEURAL at 17:15

## 2018-07-23 RX ADMIN — Medication 30 ML: at 15:10

## 2018-07-23 RX ADMIN — BUPIVACAINE 20 ML: 13.3 INJECTION, SUSPENSION, LIPOSOMAL INFILTRATION at 17:15

## 2018-07-23 NOTE — PROVIDER NOTIFICATION
07/22/18 4588   Provider Notification   Provider Name/Title Dr. Duggan   Method of Notification In Department   Request Evaluate - Remote   Notification Reason Uterine Activity   Pt up to bathroom and denies feeling ctx. Pt denies bleeding or leaking of fluid. Plan per Dr. Duggan for speculum exam if pt starts feeling ctx again. Pt agreeable to plan, states will notify RN if feeling ctx again.

## 2018-07-23 NOTE — BRIEF OP NOTE
[facility] OB/GYN Brief Operative Note    Pre-operative diagnosis: Cha pregnancy at 36w4d   Placenta previa  Acute bleeding     Post-operative diagnosis: Same     Procedure: Procedure(s):   SECTION, Primary Low transverse     Surgeon: Dr. Cricket Bryant   Assistant(s): MD Shannon Lugodeclan Aguilar MS3      Anesthesia: Spinal Anesthesia     Estimated blood loss: 2474mL   Total IV fluids: 900 mL, crystalloid   Blood transfusion: No transfusion was given during surgery   Total urine output: 300 ml   Drains: None   Specimens: Placenta   Implants: None    Complications: None apparent    Condition: Patient taken to recovery in stable condition.     Findings: Viable male infant weight and apgar pending     Romie Aaron MD  [facility] Fellow  2018 4:58 PM     .Physician Attestation   I was present for the entire procedure between opening and closing.    Cricket Bryant  Date of Service (when I saw the patient): 18  EBL includes blood loss prior to the procedure. Patient was actively bleeding prior to surgery and had a large amount of blood pooled in the vagina. This was counted as part of intraoperative blood loss after the procedure.

## 2018-07-23 NOTE — PROVIDER NOTIFICATION
07/22/18 2230   Provider Notification   Provider Name/Title Dr. Duggan    Method of Notification Electronic Page   Notification Reason Uterine Activity;Pain     Provider notified pt having lower back pain and lower abdominal cramping staring within the last 30 minutes.

## 2018-07-23 NOTE — PLAN OF CARE
Problem: Bleeding, Second or Third Trimester (Adult,Obstetrics,Pediatric)  Goal: Signs and Symptoms of Listed Potential Problems Will be Absent, Minimized or Managed (Bleeding, Second or Third Trimester)  Signs and symptoms of listed potential problems will be absent, minimized or managed by discharge/transition of care (reference Bleeding, Second or Third Trimester (Adult,Obstetrics,Pediatric) CPG).   Outcome: No Change  Pt having ctx during fetal monitoring. Initially not feeling ctx, however, now feeling lower abd cramping and lower back pain. Provider notified, fluid bolus given and frequent emptying of bladder encouraged. Will continue to monitor.

## 2018-07-23 NOTE — ANESTHESIA PREPROCEDURE EVALUATION
Anesthesia Plan      History & Physical Review  History and physical reviewed and following examination; no interval change.    ASA Status:  3 emergent.        Plan for Spinal   PONV prophylaxis:  Ondansetron (or other 5HT-3) and Droperidol or Haldol  Urgent  for bleeding placenta praevia.       Postoperative Care  Postoperative pain management:  Multi-modal analgesia.      Consents  Anesthetic plan, risks, benefits and alternatives discussed with:  Patient..           ANESTHESIA PREOP EVALUATION    NPO Status: NPO per Anesthesia Guidelines for Procedure/Surgery Except for: Meds      PMHx/PSHx/ROS:  PAST MEDICAL HISTORY:   Past Medical History:   Diagnosis Date     Closed fracture of unspecified part of humerus     fx of radius     Lyme disease     history of     PAC (premature atrial contraction) 2013    Holter     PVC (premature ventricular contraction) 2013    Holter       PAST SURGICAL HISTORY:   Past Surgical History:   Procedure Laterality Date     ------------OTHER-------------      Lipoma removal     LAMINECT/DISCECTOMY, LUMBAR  2002     TONSILLECTOMY         FAMILY HISTORY:   Family History   Problem Relation Age of Onset     Cancer Mother      breast     No Known Problems Father      Cerebrovascular Disease Maternal Grandmother      Diabetes Maternal Grandmother      adult-onset     HEART DISEASE Maternal Grandfather      Cancer Paternal Grandmother      HEART DISEASE Paternal Grandfather      No Known Problems Brother          Allergies:   Allergies   Allergen Reactions     Clindamycin Rash       Meds:   Prescriptions Prior to Admission   Medication Sig Dispense Refill Last Dose     Prenatal Vit-Fe Fumarate-FA (PRENATAL MULTIVITAMIN PLUS IRON) 27-0.8 MG TABS per tablet Take 1 tablet by mouth daily 1 tablet 0 2018 at 1200       No current outpatient prescriptions on file.       BMP:  Lab Results   Component Value Date     2018      Lab Results    Component Value Date    POTASSIUM 3.5 07/06/2018     Lab Results   Component Value Date    CHLORIDE 106 07/06/2018     Lab Results   Component Value Date    MALATHI 8.3 07/06/2018     Lab Results   Component Value Date    CO2 25 07/06/2018     Lab Results   Component Value Date    BUN 10 07/06/2018     Lab Results   Component Value Date    CR 0.46 07/06/2018     Lab Results   Component Value Date     07/06/2018        CBC:  Lab Results   Component Value Date    WBC 9.6 07/06/2018     Lab Results   Component Value Date    HGB 11.1 07/06/2018     Lab Results   Component Value Date    HCT 34.5 07/06/2018     Lab Results   Component Value Date     07/06/2018        Mercedes Pearce M.D.    7/23/2018  3:15 PM

## 2018-07-23 NOTE — PLAN OF CARE
Problem: Patient Care Overview  Goal: Plan of Care/Patient Progress Review  Outcome: No Change  Pt stable, VS WDL. Pt denies: pain, bleeding, leaking of fluid, feeling contractions. FHR normal baseline, moderate variability, accels present, no decels. Contractions & irritability per toco, pt denies feeling; Dr. Duggan notified.

## 2018-07-23 NOTE — ANESTHESIA PROCEDURE NOTES
Peripheral Nerve Block Procedure Note    Staff:     Anesthesiologist:  CINDY GARNETT    Resident/CRNA:  JOAN BACH    Block performed by resident/CRNA in the presence of a teaching physician    Location: PACU  Procedure Start/Stop TImes:      7/23/2018 5:05 PM     7/23/2018 5:15 PM    patient identified, IV checked, site marked, risks and benefits discussed, informed consent, monitors and equipment checked, pre-op evaluation, at physician/surgeon's request and post-op pain management      Correct Patient: Yes      Correct Position: Yes      Correct Site: Yes      Correct Procedure: Yes      Correct Laterality:  Yes    Site Marked:  Yes  Procedure details:     Procedure:  TAP    ASA:  3    Laterality:  Bilateral    Position:  Supine    Sterile Prep: chloraprep, patient draped, mask and sterile gloves      Needle:  Short bevel    Needle gauge:  21    Needle length (mm):  110    Ultrasound: Yes      Ultrasound used to identify targeted nerve, plexus, or vascular structure and placed a needle adjacent to it      A permanent image is NOT entered into the patient's record.      Abnormal pain on injection: No      Blood Aspirated: No      Paresthesias:  No    Bleeding at site: No      Test dose negative for signs of intravascular injection: Yes      Bolus via:  Needle    Infusion Method:  Single Shot    Complications:  None  Assessment/Narrative:      Printer not working, image not saved into chart.

## 2018-07-23 NOTE — PLAN OF CARE
Pt seen by MD, states cramping subsides after using restroom. Plan is to continue on monitor to assess for  increase in ctx's.

## 2018-07-23 NOTE — PROVIDER NOTIFICATION
07/22/18 2100   Provider Notification   Provider Name/Title Dr. Duggan   Method of Notification In Department   Notification Reason Uterine Activity     Provider notified pt ctx every 1-4 minutes and pt not feeling ctx. Per provider plan is to give 500 mL bolus and watch to see if ctx space out. Pt instructed to notify nurse if feeling ctx, cramping, LOF, and bleeding.

## 2018-07-23 NOTE — ANESTHESIA CARE TRANSFER NOTE
Patient: Enid Cisneros    Procedure(s):  Primary  Section  - Wound Class: II-Clean Contaminated    Diagnosis: Placenta Previa   Diagnosis Additional Information: No value filed.    Anesthesia Type:   Spinal     Note:  Airway :Room Air  Patient transferred to:PACU  Comments: Patient resting comfortably, breathing spontaneously.  Clyde Johnson  Handoff Report: Identifed the Patient, Identified the Reponsible Provider, Reviewed the pertinent medical history, Discussed the surgical course, Reviewed Intra-OP anesthesia mangement and issues during anesthesia, Set expectations for post-procedure period and Allowed opportunity for questions and acknowledgement of understanding      Vitals: (Last set prior to Anesthesia Care Transfer)    CRNA VITALS  2018 1621 - 2018 1721      2018             Pulse: 73    SpO2: 98 %                Electronically Signed By: Clyde Johnson MD  2018  5:22 PM

## 2018-07-23 NOTE — ANESTHESIA POSTPROCEDURE EVALUATION
Patient: Enid Cisneros    Procedure(s):  Primary  Section  - Wound Class: II-Clean Contaminated    Diagnosis:Placenta Previa   Diagnosis Additional Information: No value filed.    Anesthesia Type:  Spinal    Note:  Anesthesia Post Evaluation    Patient location during evaluation: PACU  Patient participation: Able to fully participate in evaluation  Level of consciousness: awake and alert  Pain management: adequate  Airway patency: patent  Cardiovascular status: acceptable  Respiratory status: acceptable  Hydration status: acceptable  PONV: none     Anesthetic complications: None          Last vitals:  Vitals:    18 1503 18 1514 18 1521   BP:      Pulse:      Resp:      Temp:      SpO2: 98% 98% 98%         Electronically Signed By: Mercedes Pearce MD  2018  5:27 PM

## 2018-07-23 NOTE — OR NURSING
OR to PACU Transfer Note  Data: Pt to OB PACU at 1654 via cart. PIV infusing LR and Pitocin without complications, ulrich with urine to gravity, temp 97.7, vs stable, pt does not complain of pain and/or nausea.   Interventions: IV to pump, monitors and alarms on, Report obtained from Anesthesia Resident, SCD on.  Response: Pt in stable condition.  Plan: Patient instructed to notify RN for pain or nausea, routine post op cares, initiate breastfeeding/pumping as soon as patient/infant able. TAPS block planned for recovery period.

## 2018-07-23 NOTE — PROVIDER NOTIFICATION
07/23/18 1457   Provider Notification   Provider Name/Title Dr Bryant and Dr Torre   Method of Notification In Department   notified to come to room stat for vaginal bleeding.

## 2018-07-23 NOTE — OP NOTE
[facility]   OPERATIVE NOTE:  SECTION     Surgery Date:  2018  Surgeon(s): Dr. Cricket Bryant  Assistants:  Romie Aaron MD, [facility] fellow                     Noah Aguilar MS 3    Preoperative Diagnoses:  1.  Intrauterine pregnancy at 36w4d  2.  Placenta previa  3. Acute bleeding     Postoperative diagnoses: Same     Procedure performed:  primary low segment transverse  section via pfannesteil incision with 2 layer uterine closure    Anesthesia:  Spinal with duramorph  Est Blood Loss (mL): 2628 mL  Fluid replacement: 900 mL lactated Ringer's.   Specimens: Placenta   Complications: None      Operative findings: Single viable male infant at 15:59 hours on 2018   Apgars of 7/5/6/6. Birth weight PENDING.  Fetal presentation: Oblique breech.    Amniotic fluid: clear.  Placenta intact with 3 vessel cord.  Normal appearing uterus, fallopian tubes, ovaries.  No intraabdominal adhesions.  No abdominal wall adhesions      Indication: Enid Cisneros is a 38 year old, , who was admitted at 33w2d by LMP  c/w 22w6d ultrasound for second episode of placental bleed from placenta previa.  She received a rescue course of steroids on -2018. She remained inpatient for close monitoring. At 36w4d she reports heavy vaginal bleeding. It was recommended to proceed with an urgent c/section. The risks, benefits, and alternatives of  delivery were explained and the patient agreed to proceed.     Procedure details:  After obtaining informed consent , the patient was taken to the operating room. She received spinal anesthesia prior to the skin incision. She was placed in the dorsal supine position with a leftward tilt and prepped and draped in the usual sterile fashion.     Following test of adequate spinal anesthesia, the abdomen was entered through a pfannenstiel transverse skin incision. The skin incision was made sharply and carried through the  subcutaneous tissue to the fascia.  Fascia was incised in the midline and extended laterally bluntly .The muscle was  in the midline.      The peritoneum was entered bluntly and the opening extended by digital dissection with care to avoid the bladder. A bladder blade was placed. The vesicouterine peritoneum was entered sharply with Metzenbaum scissors and incision extended laterally. The bladder flap was created digitally and the bladder blade replaced. The lower segment of the uterus was opened sharply in a transverse fashion and extended with digital pressure. The placenta was anterior and brisk bleeding was noted. The infant's presentation was oblique breech, the sacrum was identified and reposition and the hips were guided gently through the hysterotomy with the surgeons fingers. The shoulder and arms were swept anteriorly and the head was delivered atraumatically. The cord was around the neck once. The cord was clamped and cut after 30 seconds and cut and the infant was handed off to the waiting NICU staff.      The uterus was cleared of all clots and debris.  The uterus was massaged and was noted to be firm.  Oxytocin was given through the running IV.  With vigorous massage as well as administration of oxytocin, good uterine tone was achieved. The hysterotomy was repaired with 0-vicryl suture in a running locked fashion. A 2nd layer of 0-vycril was  used to imbricate the incision and good hemostasis was achieved. The bladder flap was inspected and found to be hemostatic.  The posterior cul-de-sac clean of clots and debris     The bilateral pericolic gutters were cleaned .  The hysterotomy was again inspected and found to be hemostatic.  The abdominal wall was examined and also found to be hemostatic.  The fascia was closed with a running suture of 0-vycril.   Subcutaneous tissue was irrigated. Areas that were not hemostatic were controlled with cautery. The subcutaneous tissue was greater than 3cm in  depth and require reapproximation with 3-0 plain suture with interrupted stitches. The skin was closed with 4-0 vicryl on a Bert needle. The patient tolerated the procedure well and was taken to the recovery room in stable condition. All sponge, needle and instrument counts were correct x2. Dr. Bryant was present for the entire procedure.     Romie Aaron MD  [facility] Fellow 7/23/2018 10:00 PM    Physician Attestation   I was present for the entire procedure between opening and closing.    Cricket Bryant  Date of Service (when I saw the patient): 7/23/18

## 2018-07-23 NOTE — PROVIDER NOTIFICATION
07/23/18 0615   Provider Notification   Provider Name/Title Dr. Duggan   Method of Notification In Department   Request Evaluate - Remote   Notification Reason Uterine Activity   FYI: Pt gordo w/ irrit. per toco, pt states not feeling ctx, FHR appropriate for gestational age

## 2018-07-23 NOTE — PROGRESS NOTES
[facility] Antepartum Progress Note    Subjective: Enid is doing well this morning. Overnight had some mild contractions which improved with IVF and emptying of her bladder. This morning there is some uterine irritability on external tocometer but she is not feeling any cramping or contractions or pressure. Denies any vaginal bleeding or brown discharge. She denies chest pain, shortness of breath, leg pain or swelling.    Objective:   Vitals:    18 0730 18 1600 18 2340 18 0723   BP: 99/54 129/59 95/54 103/61   Pulse:    81   Resp: 16 16 16 16   Temp: 97.6  F (36.4  C) 97.5  F (36.4  C) 98  F (36.7  C) 97.6  F (36.4  C)   TempSrc: Oral Oral Oral Oral   Weight:         Gen: Resting comfortably in bed, NAD  CV: RRR, no tachycardia  Resp: CTAB, breathing comfortably on room air  Abd: Gravid, non-tender, non-distended  Ext: non-tender, trace pedal edema    FHT: baseline 130, moderate variability, accelerations present, no decels  [place]: uterine irritability.    Assessment: Enid Cisneros is a 38 year old  @ 36w4d by 22w6d US, admitted for recurrent bleeding in the setting of placenta previa. Continues to be stable.    Plan:    Marginal Placenta Previa complicated with 2nd episode of bleeding with additional bleeds during current hospitalization: currently stable. Last episode of spotting .   - Last US  showed anterior marginal previa.  Vaginal delivery is contraindicated and at this gestational age, likelihood of migration of the placenta is low.   planned for  1030AM @ 37w0d.  Will not give rescue BMZ as she is >34wga.  - Repeat US  to evaluate persistence of placenta previa  - Growth US  revealed an anterior lower placenta margin adjacent to the internal os, 2032g 66%tile, transverse with head to maternal right,   JEWEL 15.9, MVP 6.4cm.  Repeat growth  showed appropriate interval growth, 91%st percentile.   - s/p Beta -  - type and crossed x2, type and screen  Q72h (next 7/24 ord'd)   - CS consent signed  - No digital cervical exams  - Reg diet, make NPO if bleeding  - Hgb 11.4 on admission    Maternal hx of PACs/PVCs:  - EKG nml 6/30, 7/6  - s/p 48h Holter monitor in 2013  - currently asymptomatic, no medication    Fetal wellbeing:  - Fetal head maternal L, transverse on scan 6/30  - Reactive NST  - Continue TID monitoring  - Repeat growth 7/17 showed appropriate interval growth, 91%st percentile.  - NICU consult completed 6/30    -PNC: Rh+/RI    -Dispo: Given recurrent vaginal bleeding  in context of known placenta previa, will plan continued inpatient management until delivery on 7/26.     Patient seen and discussed with Dr. Annette Jimenez MD  OB/GYN Resident, PGY-3  [phone] (OB G3 Pager)

## 2018-07-23 NOTE — PLAN OF CARE
Problem: Patient Care Overview  Goal: Plan of Care/Patient Progress Review  Outcome: No Change  Pt is comfortable and stable.  Not feeling any ctxs.  No s/s of PTL. Pt reported feeling ctxs last evening.  No vaginal bleeding.  Intact.  +FM per pt.  No pain.  Pt asking about a stool softner.  Per pt, soft BMs with last BM yesterday.  Pt is concerned she will need to stay in the hospital longer if she doesn't have a BM after her scheduled 7/26 C/S.  No stool softner ordered.  Discussed with Dr. Jimenez, Dr. Bryant and Dr. Grubbs during the [facility] bedside rounds.  Pt does not need to start stool softners at this time.  Pt is ambulating in the room/hallway.  SCDs are available in the room and pt states understanding of their use.  IV is saline locked.  VSS.  Pt will inform me of any changes in her condition or if she needs anything.  Call light is within reach.  Next type and screen and US on 7/24.

## 2018-07-23 NOTE — PROGRESS NOTES
OB/GYN Interval Update    Went to evaluate patient given new onset lower abdominal cramping and back pain starting at 2230. We had been picking up uterine irritability and irregular uterine contractions since 2100, but patient was not feeling any discomfort at that time. She received 500 ml of lactated ringers followed by an additional 500 mL at 2230 when she started feeling the cramping. She emptied her bladder at 2250 and felt significantly more comfortable without cramping. She denies any bleeding. Discussed with patient our evaluation of  labor would be a speculum exam to view the cervix given her placenta previa. We will observe for now. If the cramping returns, we will perform the speculum exam. Patient is comfortable with this plan and feels she can fall asleep.    FHT:  Baseline 125 bpm, moderate variability, + accels, no decels  [place]: q3-7 minutes    Rosalina Duggan MD  OB/GYN, PGY2  18

## 2018-07-23 NOTE — PLAN OF CARE
Problem: Patient Care Overview  Goal: Plan of Care/Patient Progress Review  Outcome: No Change  Called into pt's room at 1457 for bleeding.  Blood in toliet.  Pad/panties saturated.  Called for Dr. Bryant and Dr. Pizano.  Placed on US.  Confirmed FHR/pulse are different w pulse oximetry.  Per Dr. bryant - prep for c/s.  Prep started 1500 and completed at 1507.  Seen by Dr Pearce and Dr. Simons.  2d IV site started by EB Kirk RN  - unable to obtain labs.  Lab called stat for draw.  1520 bedside report to EB Hollis RN.  To OB OR 1 via bed.  Vaginal bleeding unmeasurable while in bed during prep.  First count done in OR with scrub.

## 2018-07-23 NOTE — PLAN OF CARE
Problem: Patient Care Overview  Goal: Plan of Care/Patient Progress Review  Outcome: Declining  VSS. Denies LOF and bleeding. Active fetal movement. Pt having ctx on monitor, see previous note. Plan is to closely monitor for signs of labor including increasing frequency of cramps, LOF, pressure, and bleeding. Notify prvoider with changes.

## 2018-07-23 NOTE — PROVIDER NOTIFICATION
07/23/18 1419   Provider Notification   Provider Name/Title Dr. Torre   Method of Notification In Department   review tracing and uterine activity.  Pt is not feeling any ctxs and is not bleeding.   mod with accels and no decels.  Per Dr. Torre, pt may come off monitor.

## 2018-07-23 NOTE — PROVIDER NOTIFICATION
07/22/18 2156   Provider Notification   Provider Name/Title Dr. Duggan   Method of Notification Phone   Notification Reason Uterine Activity     Per provider, continue to monitor pt uterine activity until ctx space out to no more then 1 in 10 minutes or stop.

## 2018-07-23 NOTE — ANESTHESIA PROCEDURE NOTES
Spinal/LP Procedure Note    Spinal Block  Staff:     Anesthesiologist:  CINDY GARNETT    Resident/CRNA:  JOAN BACH    Spinal/LP performed by resident/CRNA in presence of a teaching physician.    Location: In OR BEFORE Induction  Procedure Start/Stop Times:      7/23/2018 3:30 PM     7/23/2018 3:40 PM    patient identified, IV checked, site marked, risks and benefits discussed, informed consent, monitors and equipment checked, pre-op evaluation, at physician/surgeon's request and post-op pain management      Correct Patient: Yes      Correct Position: Yes      Correct Site: Yes      Correct Procedure: Yes      Correct Laterality:  Yes    Site Marked:  Yes  Procedure:     Procedure:  Intrathecal    ASA:  2    Position:  Sitting    Sterile Prep: chloraprep, mask, sterile gloves and patient draped      Insertion site:  L3-4    Approach:  Midline    Needle Type:  Elvi    Needle gauge (G):  25    Local Skin Infiltration:  1% lidocaine    Needle Length (in):  3.5    Introducer used: Yes      Introducer gauge:  20 G    Attempts:  3    Redirects:  3    CSF:  Clear    Paresthesias:  No  Assessment/Narrative:     Sensory Level:  T4     2 attempts by resident, 1 by attending Anesthesiologist.

## 2018-07-24 LAB — HGB BLD-MCNC: 9.1 G/DL (ref 11.7–15.7)

## 2018-07-24 PROCEDURE — 25000128 H RX IP 250 OP 636: Performed by: OBSTETRICS & GYNECOLOGY

## 2018-07-24 PROCEDURE — 85018 HEMOGLOBIN: CPT | Performed by: OBSTETRICS & GYNECOLOGY

## 2018-07-24 PROCEDURE — 12000030 ZZH R&B OB INTERMEDIATE UMMC

## 2018-07-24 PROCEDURE — 25000128 H RX IP 250 OP 636: Performed by: STUDENT IN AN ORGANIZED HEALTH CARE EDUCATION/TRAINING PROGRAM

## 2018-07-24 PROCEDURE — 25000132 ZZH RX MED GY IP 250 OP 250 PS 637: Performed by: OBSTETRICS & GYNECOLOGY

## 2018-07-24 RX ORDER — ACETAMINOPHEN 325 MG/1
650 TABLET ORAL EVERY 4 HOURS PRN
Qty: 100 TABLET | Refills: 0 | Status: SHIPPED | OUTPATIENT
Start: 2018-07-26 | End: 2024-03-19

## 2018-07-24 RX ORDER — FERROUS GLUCONATE 324(38)MG
324 TABLET ORAL
Qty: 100 TABLET | Refills: 0 | Status: SHIPPED | OUTPATIENT
Start: 2018-07-24 | End: 2024-03-19

## 2018-07-24 RX ORDER — AMOXICILLIN 250 MG
1 CAPSULE ORAL 2 TIMES DAILY PRN
Qty: 60 TABLET | Refills: 0 | Status: SHIPPED | OUTPATIENT
Start: 2018-07-24 | End: 2019-06-27

## 2018-07-24 RX ORDER — OXYCODONE HYDROCHLORIDE 5 MG/1
5-10 TABLET ORAL
Qty: 10 TABLET | Refills: 0 | Status: SHIPPED | OUTPATIENT
Start: 2018-07-24 | End: 2018-07-25

## 2018-07-24 RX ORDER — IBUPROFEN 600 MG/1
600 TABLET, FILM COATED ORAL EVERY 6 HOURS PRN
Qty: 120 TABLET | Refills: 0 | Status: SHIPPED | OUTPATIENT
Start: 2018-07-24 | End: 2019-06-27

## 2018-07-24 RX ADMIN — SODIUM CHLORIDE, SODIUM LACTATE, POTASSIUM CHLORIDE, CALCIUM CHLORIDE AND DEXTROSE MONOHYDRATE: 5; 600; 310; 30; 20 INJECTION, SOLUTION INTRAVENOUS at 01:18

## 2018-07-24 RX ADMIN — SENNOSIDES AND DOCUSATE SODIUM 2 TABLET: 8.6; 5 TABLET ORAL at 21:18

## 2018-07-24 RX ADMIN — SIMETHICONE CHEW TAB 80 MG 80 MG: 80 TABLET ORAL at 07:50

## 2018-07-24 RX ADMIN — ACETAMINOPHEN 975 MG: 325 TABLET, FILM COATED ORAL at 21:18

## 2018-07-24 RX ADMIN — IBUPROFEN 800 MG: 800 TABLET ORAL at 18:29

## 2018-07-24 RX ADMIN — SODIUM CHLORIDE, SODIUM LACTATE, POTASSIUM CHLORIDE, CALCIUM CHLORIDE AND DEXTROSE MONOHYDRATE: 5; 600; 310; 30; 20 INJECTION, SOLUTION INTRAVENOUS at 06:30

## 2018-07-24 RX ADMIN — SODIUM CHLORIDE, POTASSIUM CHLORIDE, SODIUM LACTATE AND CALCIUM CHLORIDE 1000 ML: 600; 310; 30; 20 INJECTION, SOLUTION INTRAVENOUS at 05:26

## 2018-07-24 RX ADMIN — KETOROLAC TROMETHAMINE 30 MG: 30 INJECTION, SOLUTION INTRAMUSCULAR at 04:38

## 2018-07-24 RX ADMIN — SENNOSIDES AND DOCUSATE SODIUM 2 TABLET: 8.6; 5 TABLET ORAL at 07:50

## 2018-07-24 RX ADMIN — ACETAMINOPHEN 975 MG: 325 TABLET, FILM COATED ORAL at 04:38

## 2018-07-24 RX ADMIN — IBUPROFEN 800 MG: 800 TABLET ORAL at 10:57

## 2018-07-24 RX ADMIN — ACETAMINOPHEN 975 MG: 325 TABLET, FILM COATED ORAL at 12:44

## 2018-07-24 NOTE — PROGRESS NOTES
Post  Anesthesia Follow Up Note    Patient: Enid Cisneros    Patient location: Postpartum floor.    Chief complaint: Acute postoperative pain managment    Procedure(s) Performed:   SECTION    Anesthesia type: Spinal Block  and transversus abdominus plane (TAP) nerve block        Subjective:   The patient reports good pain control.  The patient denies weakness, paresthesia, numbness or tingling lips, tinnitus, metallic taste, difficulties breathing or voiding, nausea or vomiting.       Objective:  Respiratory Function (RR / SpO2 / Airway Patency): Satisfactory    Cardiac Function (HR / Rhythm / BP): Satisfactory    Strength and sensation lower extremities: Strength 5/5 and grossly symmetric bilateral LE    Site of spinal/epidural insertion: clean and intact,  tenderness, swelling or erythema    Last Vitals: BP 91/63  Pulse 81  Temp 36.7  C (98  F) (Oral)  Resp 16  Wt 99.9 kg (220 lb 3.2 oz)  LMP 2017 (Exact Date)  SpO2 96%  Breastfeeding? Unknown  BMI 34.49 kg/m2      Assessment and Plan:   Enid Cisneros is a 38 year old female POD #1 s/p   SECTION with Spinal bupivacaine, morphine, and fentanyl and single shot TAP nerve block injections with liposome bupivacaine (Exparel) long-acting 1.3% 20mL given on  for postoperative analgesia.  Pt has not yet ambulated, no weakness or paresthesias.  No evidence of adverse side effects associated with spinal and nerve block injections. Pt is receiving adequate incisional pain control.  Anticipate up to 72 hours of incisional pain control.  Anticipate patient will require opioid/nonopioid analgesics for visceral and muscle pain not controlled with local anesthetic.      - discussed plan with attending anesthesiologist    Clyde Johnson MD  Ca-2

## 2018-07-24 NOTE — PLAN OF CARE
Pt transferred to [facility] via cart at 1915. VSS and pt in stable condition upon transfer. Pt oriented to room, unit, purple folder, and call light system.  at the bedside. All belongings brought with pt.

## 2018-07-24 NOTE — OR NURSING
Surgical Record Edited/Adjusted    Surgical encounter record for Enid Cisneros ([id number])  Done on: 2018 - 2018  The procedure: Procedure(s):  Primary  Section  - Wound Class: II-Clean Contaminated   MD: Ana Rosa Arellano MD     Documentation was done by:  Circulator: Silvia Bowles RN  Scrub Person: Mirian Peters    The original documentation was edited on 2018 by Zoltan Carrington RN    Documentation edited includes: Time Post-op    Purpose for Editing Documentation: Documentation Error    Edited documentation taken from: Record Documentation    Signed: Zoltan Carrington RN, 2018

## 2018-07-24 NOTE — PROGRESS NOTES
Post Partum Progress Note    Subjective:  Patient is doing well.  Minimal lochia. Having cramping but denies any pain. Passing flatus.    Denies any fever, chills, SOB, chest pain, N/V,  headache, dizziness.  Planning to pump with baby in the NICU.  Partner is planning a vasectomy.    Objective:  Patient Vitals for the past 24 hrs:   BP Temp Temp src Pulse Heart Rate Resp SpO2   07/24/18 0400 91/63 98  F (36.7  C) Oral - 65 16 96 %   07/24/18 0112 94/57 98.2  F (36.8  C) Oral - 65 16 97 %   07/23/18 2356 96/57 98.3  F (36.8  C) Oral - 74 16 96 %   07/23/18 2230 99/58 98.3  F (36.8  C) Oral - 73 16 97 %   07/23/18 2130 113/65 98.6  F (37  C) Oral - 79 16 96 %   07/23/18 2030 115/75 - - - 71 16 97 %   07/23/18 2000 101/63 - - - 73 16 97 %   07/23/18 1930 100/59 98.3  F (36.8  C) Oral - 66 16 96 %   07/23/18 1830 106/62 - - - 64 (!) 32 96 %   07/23/18 1815 103/61 - - - 66 (!) 0 98 %   07/23/18 1800 108/62 - - - 67 20 100 %   07/23/18 1755 - - - - - - 98 %   07/23/18 1745 94/69 - - - 70 (!) 0 99 %   07/23/18 1730 (!) 124/97 - - - 73 (!) 7 100 %   07/23/18 1715 107/56 - - - 68 12 99 %   07/23/18 1700 101/57 - - - 62 12 100 %   07/23/18 1521 - - - - - - 98 %   07/23/18 1514 - - - - - - 98 %   07/23/18 1503 - - - - - - 98 %   07/23/18 1457 (!) 131/97 - - - - - -   07/23/18 0723 103/61 97.6  F (36.4  C) Oral 81 - 16 -       General: AAOx3, NAD, appears generally well  Resp:  Breathing comfortably on room air  Abd:  Soft, nontender, nondistended, fundus firm below the umbilicus  Incision: clean dressing in place  Ext:  1+ edema in bilateral LE      Assessment and Plan:  38 year old old  POD#1 s/p PLTCS for vaginal bleeding in the context of placenta previa.    - Pain: Scheduled tylenol. PRN oxycodone. Toradol (transition to ibuprofen at 24 hours). S/p TAP block  - Heme: Acute blood loss anemia, expected secondary to surgical blood loss. Hgb 12.0 > QBL 2628 > AM pending.  - CV: Two mild range blood pressures in  postoperative period. Now normotensive. Continue to monitor.   - : Low UOP overnight s/p 1L IV fluid bolus. Continue to monitor closely.  - GI: Regular diet. Continue stool softeners. Anti-emetics PRN.  - Rh positive. Rubella immune.  - Pumping for baby in NICU.  - Partner planning vasectomy for birth control    Anticipate discharge to home POD#3    # Pain Assessment:  Current Pain Score 2018   Patient currently in pain? denies   Pain score (0-10) -   Pain location -   Pain descriptors -   - Enid is experiencing pain due to recent  delivery. Pain management was discussed and the plan was created in a collaborative fashion.  Enid's response to the current recommendations: engaged  - Opioid regimen: oxycodone, 5-10 mg q3h PRN  - Response to opioid medications: patient has not taken yet   - Bowel regimen: senna  - Pharmacologic adjuvants: NSAIDs and Acetaminophen  - Non-pharmacologic adjuvants: Heat and Ice    Rosalina Duggan, PGY3  OB/GYN Resident  18    Physician Attestation   I, Cricket Bryant, saw this patient with the resident and agree with the resident s findings and plan of care as documented in the resident s note.      I personally reviewed vital signs and labs.    Key findings: postoperative day 1 from emergency  delivery for actively bleeding placenta previa. Blood loss estimated from procedure in part was due to pre-operative blood loss.    Cricket Bryant  Date of Service (when I saw the patient): 18    Time Spent on this Encounter   ICricket, spent a total of 10 minutes bedside and on the inpatient unit today managing the care of Enid Cisneros.  Over 50% of my time on the unit was spent counseling the patient and /or coordinating care regarding postoperative care. See note for details.

## 2018-07-24 NOTE — PROGRESS NOTES
Mother transferred to postpartum unit at 1850 via cart after completion of immediate recovery period and visiting the baby in the NICU. Patient oriented to room and report given to Lily HINES RN who assumes patient care. Mother in stable condition upon transfer.

## 2018-07-24 NOTE — PLAN OF CARE
Problem: Patient Care Overview  Goal: Plan of Care/Patient Progress Review  Outcome: No Change  VSS. Postpartum checks WDL. Incisional drsg CDI. Denies pain, taking tylenol and toradol as scheduled. Earl in place with adequate UO. Pitocin infusing at 100ml/hr. Planning to pump for infant in the NICU.  supportive at the bedside. Will continue POC.

## 2018-07-24 NOTE — PROVIDER NOTIFICATION
07/24/18 0505   Provider Notification   Provider Name/Title Dr. Brown   Method of Notification Electronic Page   Request Evaluate-Remote   Notification Reason Other  (Urine output <30 mL/hr for greater than 4 hours.)     Notified MD Kevin, that patient's urine output has been less than 30mL/hr for greater than 4 hours. Also informed via page that D5LR has been running at 125mL/hr since 0100. Will run LR 1000mL bolus over one hour per MD order. Plan to keep ulrich catheter in place this morning until patient responds to bolus. MD okay with plan to keep ulrich in place for now. Will continue to monitor urine output.

## 2018-07-24 NOTE — PLAN OF CARE
Problem: Patient Care Overview  Goal: Plan of Care/Patient Progress Review  Outcome: Therapy, progress towards functional goals is fair  VSS. Fundus firm and midline. Bleeding WNL. Denies pain but is taking Toradol and Tylenol around the clock to stay ahead of her pain. Earl remains in place with plan to remove once patient has adequate urine output. Urine output was less than 30ml/hr overnight. 1000mL bolus of LR given this morning at 530 per MD orders. Assisted patient with pumping twice overnight. Incision dressing intact with no visible drainage.

## 2018-07-24 NOTE — PLAN OF CARE
Problem: Patient Care Overview  Goal: Plan of Care/Patient Progress Review  Outcome: Improving  Data: Vital signs within normal limits. Postpartum checks within normal limits - see flow record. Patient eating and drinking normally. Patient able to empty bladder independently and is up ambulating. No apparent signs of infection. Incision c/d/i.. Patient performing self cares and is able to pump for infant.   Action: Patient medicated during the shift for uterine cramping . See MAR. Patient reassessed within 1 hour after each medication and pain was improved - patient stated she was comfortable. Patient education done about self care.  See flow record.  Response: Positive attachment behaviors observed with infant at NICU. Support persons  present.   Plan: Anticipate discharge when medically ready.

## 2018-07-25 PROCEDURE — 12000028 ZZH R&B OB UMMC

## 2018-07-25 PROCEDURE — 25000132 ZZH RX MED GY IP 250 OP 250 PS 637: Performed by: OBSTETRICS & GYNECOLOGY

## 2018-07-25 RX ADMIN — ACETAMINOPHEN 975 MG: 325 TABLET, FILM COATED ORAL at 14:11

## 2018-07-25 RX ADMIN — IBUPROFEN 800 MG: 800 TABLET ORAL at 22:50

## 2018-07-25 RX ADMIN — IBUPROFEN 800 MG: 800 TABLET ORAL at 16:24

## 2018-07-25 RX ADMIN — IBUPROFEN 800 MG: 800 TABLET ORAL at 01:05

## 2018-07-25 RX ADMIN — IBUPROFEN 800 MG: 800 TABLET ORAL at 09:50

## 2018-07-25 RX ADMIN — SENNOSIDES AND DOCUSATE SODIUM 2 TABLET: 8.6; 5 TABLET ORAL at 09:51

## 2018-07-25 RX ADMIN — ACETAMINOPHEN 975 MG: 325 TABLET, FILM COATED ORAL at 22:50

## 2018-07-25 RX ADMIN — ACETAMINOPHEN 975 MG: 325 TABLET, FILM COATED ORAL at 05:27

## 2018-07-25 NOTE — PLAN OF CARE
Problem: Patient Care Overview  Goal: Plan of Care/Patient Progress Review  Outcome: Improving  VSS. Postpartum assessment WNL. Lochia small no evidence of clots. Pt mood is flat and labile. Pt is withdrawn during nursing cares. Pt reports voiding adequate amounts w/o difficulty. Passing flatus. Pumping independently q4 hrs overnight for  in NICU. Pain managed with tylenol and ibuprophen. Pt is aware oxycodone is available if needed however she declines at this time. Denies headache, dizziness, lightheadedness. Pt  was present in the room overnight. Continue with POC.

## 2018-07-25 NOTE — PROGRESS NOTES
"SPIRITUAL HEALTH SERVICES  SPIRITUAL ASSESSMENT Progress Note  [facility] ([facility]) [facility]     PRIMARY FOCUS:     Goals of care    Emotional/spiritual/Hinduism distress    Support for coping    REFERRAL SOURCE: Follow-up    ILLNESS CIRCUMSTANCES:   Reflective conversation shared with pt Enid and her  Didier, which integrated elements of pregnancy and family narratives.     Context of Serious Illness/Symptom(s) - Enid delivered her baby boy (as of yet unnamed) on Monday in an emergency  after she had her third bleed with placenta previa: \"It was the most blood I've ever seen in my life.\" Baby is now on the NICU and was intubated last night \"because he's having trouble breathing.\" Enid and Didier explored the possibility that their baby is actually a younger gestational age than the doctors predicted because her first ultrasound wasn't until 15 weeks. Enid herself is still recovering from the surgery, and reports that she's \"doing well.\"    Resources for Support - Spouse, mother, j luis (especially prayer)    DISTRESS:     Emotional/Spiritual/Existential Distress - Enid was tearful as she shared how \"hard\" it's been to go through this emergency delivery and NICU stay for the baby. She explored her expectations for this pregnancy and delivery and processed the fact that \"nothing was the same\" as with her other two children. Being  from the baby has been especially distressing to Enid: \"We haven't had any skin-to-skin time,\" and \"I expected to be able to hold him when he was born, but because of how he was, we were  right away.\" The one time she was able to hold him on NICU she said that his \"oxygen levels went up,\" which she took to mean that \"he is just waiting to be held by his mom and dad.\"    Mu-ism Distress - Enid said that she often wants to \"ask God why.\" I normalized her questions and encouraged further exploration of that type of prayer.    Social/Cultural/Economic " "Distress - Enid shared that it has been hard to talk to most people because \"they just say 'Oh good' when I say we're okay; they don't say 'I'm sorry you have to deal with all that.'\"    SPIRITUAL/Yazidi COPING:     Taoist/Tina - Hinduism    Spiritual Practice(s) - Prayer is still the most effective and meaningful coping strategy for Enid: \"Nothing else has that [calming, centering] effect on me.\" She finds herself praying \"that he gets better so we can all go home and be a family.\"    Emotional/Relational/Existential Connections - We talked extensively about Enid's strength and resiliency through the entire pregnancy and delivery, how she has already had to face many unknowns and find ways to cope without being able to control or \"plan for\" the situation. Enid shared that from the moment she found out about this unplanned pregnancy, she \"just knew that he was supposed to be here\" as opposed to her previous pregnancy where she \"worried every day that I was going to lose her.\" This sense of assurance of her baby's existence helps calm her anxiety around his present health concerns.    GOALS OF CARE:    Goals of Care - For baby to get better, \"improve his oxygen numbers,\" and be well enough to take home and meet his sisters.    Meaning/Sense-Making - We discussed the difference between \"going back to normal\" and \"finding a new normal.\"    PLAN: Will follow-up 1-2x/week for duration of Enid and baby's hospitalizations.    Pattie Brink   Intern  Pager [phone]  * [facility] remains available 24/7 for emergent requests/referrals, either by having the switchboard page the on-call  or by entering an ASAP/STAT consult in Epic (this will also page the on-call ).*  "

## 2018-07-25 NOTE — PROGRESS NOTES
Post Partum Progress Note    Subjective:  Patient is tired this morning and is concerned about baby, since he had to be intubated in the NICU yesterday. From a postoperative standpoint, she is doing well. Minimal lochia.  Pain well controlled on pain meds.  Tolerating PO and ambulating without any issues.  Denies any fever, chills, SOB, chest pain, N/V,  headache, dizziness.  Planning on breast feeding and is pumping for baby.  Partner is planning vasectomy.    Objective:  Patient Vitals for the past 24 hrs:   BP Temp Temp src Pulse Heart Rate Resp SpO2   07/25/18 0108 114/60 97.8  F (36.6  C) Oral 80 - 16 -   07/24/18 1620 101/64 98.5  F (36.9  C) - 81 - 16 -   07/24/18 1244 102/42 97.7  F (36.5  C) Oral - 64 16 -   07/24/18 0747 98/68 98.1  F (36.7  C) Oral - 70 16 99 %       General: AAOx3, NAD, appears generally well  Resp:  Breathing comfortably on room air  Abd:  Soft, nontender, nondistended, fundus firm below the umbilicus  Incision: clean/dry/intact, steri strips in place (dried blood on right lateral aspect of incision)  Ext:  1+ edema in bilateral LE      Assessment and Plan:  38 year old old  POD#1 s/p PLTCS for vaginal bleeding in the context of placenta previa.     - Pain: Scheduled tylenol. PRN oxycodone and ibuprofen. S/p TAP block  - Heme: Acute blood loss anemia, expected secondary to surgical blood loss. Hgb 12.0 > QBL 2628 > 9.1. Asymptomatic. Preoperative blood loss contributed to blood loss estimated from procedure.   - CV: Two mild range blood pressures in postoperative period. Normotensive for greater than 24 hours.  - : Low UOP from POD#0-1 now resolved. Voiding spontaneously s/p ulrich.  - GI: Regular diet. Continue stool softeners. Anti-emetics PRN.  - Rh positive. Rubella immune.  - Pumping for baby in NICU.  - Partner planning vasectomy for birth control     Anticipate discharge to home POD#3    # Pain Assessment:  Current Pain Score 7/25/2018   Patient currently in pain? denies    Pain score (0-10) -   Pain location -   Pain descriptors -   - Enid is experiencing pain due to recent  delivery. Pain management was discussed and the plan was created in a collaborative fashion.  Enid's response to the current recommendations: engaged  - Opioid regimen: oxycodone, 5-10 mg q3h PRN  - Response to opioid medications: patient has not taken yet  - Bowel regimen: senna  - Pharmacologic adjuvants: NSAIDs and Acetaminophen  - Non-pharmacologic adjuvants: Heat and Ice    Rosalina Duggan, PGY3  OB/GYN Resident  18

## 2018-07-25 NOTE — PROGRESS NOTES
Received referral for social work to see regarding NICU admission.      Social work already following.  Please see social work progress notes.      Will continue to follow along throughout baby's NICU admission for needs and for support.

## 2018-07-25 NOTE — PLAN OF CARE
Problem: Patient Care Overview  Goal: Plan of Care/Patient Progress Review  Outcome: Improving  Patient had no issues thus far. Vital and fundus check wdl. Independent with self and infant cares. Will continue with current plan of care.

## 2018-07-25 NOTE — LACTATION NOTE
D:  I met with Enid in her postpartum room today.  This is her third baby, she /pumped for her other two 10 months and 8 months.  Enid is normally in good health, takes no medications, and has no history of breast/chest surgery or trauma. She has already started to pump.    I:  I gave her a folder of introductory materials and went over pumping guidelines.    We talked about hands on pumping techniques, hand expression and how to access the [place] websites. I advised her to call her insurance company about pump coverage.   A:  Mom has initial information she needs to work on supply.  P:  Will continue to provide lactation support.       Felisha Mix, RNC, IBCLC

## 2018-07-25 NOTE — PLAN OF CARE
Problem: Patient Care Overview  Goal: Plan of Care/Patient Progress Review  Outcome: No Change  VSS and assessments WDL. Pain well managed with oral medications. Up ad sita, voiding freely. Pumping independently, visits infant in NICU frequently.  in the room, supportive and involved in care. No concerns, continue with current POC.

## 2018-07-25 NOTE — PLAN OF CARE
Problem: Patient Care Overview  Goal: Plan of Care/Patient Progress Review  Outcome: Improving  [id number]:  VSS and postpartum assessments WDL.  Up ad sita with steady gait.  Independent with cares.  Pumping independently and bringing expressed colostrum to infant in NICU.  Pain managed with tylenol and ibuprofen per MAR.  Family here visiting for part of shift.  Will continue with postpartum cares and education per plan of care.

## 2018-07-26 VITALS
TEMPERATURE: 97.7 F | WEIGHT: 220.2 LBS | HEART RATE: 80 BPM | SYSTOLIC BLOOD PRESSURE: 104 MMHG | RESPIRATION RATE: 16 BRPM | OXYGEN SATURATION: 99 % | BODY MASS INDEX: 34.49 KG/M2 | DIASTOLIC BLOOD PRESSURE: 74 MMHG

## 2018-07-26 PROCEDURE — 25000132 ZZH RX MED GY IP 250 OP 250 PS 637: Performed by: OBSTETRICS & GYNECOLOGY

## 2018-07-26 RX ADMIN — IBUPROFEN 800 MG: 800 TABLET ORAL at 06:33

## 2018-07-26 RX ADMIN — ACETAMINOPHEN 975 MG: 325 TABLET, FILM COATED ORAL at 06:33

## 2018-07-26 NOTE — PLAN OF CARE
Problem: Patient Care Overview  Goal: Plan of Care/Patient Progress Review  Outcome: Improving  VSS. Postpartum assessment WNL. Voiding w/o difficulty. Pt reports passing flatus no BM postop; declines PM senna. Mother is pumping independently for  in NICU. Pain managed with tylenol and IB. Incision NAREN. Steri strips WNL. Pt and spouse visit  in NICU overnight. Plan is to discharge and boarding room is needed. Continue with POC.

## 2018-07-26 NOTE — PLAN OF CARE
Problem: Patient Care Overview  Goal: Plan of Care/Patient Progress Review  Outcome: Improving  Vital signs stable. Postpartum assessment WDL. Incision clean, dry, intact and open to air. Pain controlled with ibuprofen and tylenol. Patient up ad sita and is voiding independently. Patient passing gas but has not had a bowel movement. Pumping independently. Patient and spouse visiting infant in NICU during most of shift. Will continue with current plan of care.

## 2018-07-26 NOTE — PLAN OF CARE
Problem: Patient Care Overview  Goal: Plan of Care/Patient Progress Review  Outcome: Improving  Data: Vital signs stable, assessments within normal limits. No signs of infection noted. Patient discharge information, medication given and  instructed of signs/symptoms to look for and report per discharge instructions.   Discharge outcomes on care plan met. No apparent pain.  Action: Review of care plan, teach back, and discharge instructions done with patient.   verification with signature obtained.   Response:  Patient states understanding and comfort with self cares. All questions about self care addressed. patient discharged at 1030.

## 2018-07-26 NOTE — PROGRESS NOTES
Post Partum Progress Note    Subjective:  Patient is doing well overall. She continues to manage her pain by alternating tylenol and ibuprofen. She is tolerating a regular diet, ambulating spontaneously, voiding without difficulty, and passing flatus. Denies any fever, chills, SOB, chest pain, N/V,  headache, dizziness.  Planning on breast feeding and is pumping for baby in NICU. Partner is planning vasectomy for birth control.    Objective:  Patient Vitals for the past 24 hrs:   BP Temp Temp src Heart Rate Resp   07/25/18 2346 109/58 98.4  F (36.9  C) Oral 86 16   07/25/18 1941 112/62 98.5  F (36.9  C) Oral 74 18   07/25/18 1640 103/73 98  F (36.7  C) Oral 79 16   07/25/18 0950 102/57 97.8  F (36.6  C) Oral 90 18       General: AAOx3, NAD, appears generally well. Currently pumping.  Resp:  Breathing comfortably on room air  Abd:  Soft, nontender, nondistended, fundus firm below the umbilicus  Incision: clean/dry/intact, steri strips in place  Ext:  Trace edema in bilateral LE      Assessment and Plan:  38 year old old  POD#3 s/p PLTCS for vaginal bleeding in the context of placenta previa.      - Pain: Scheduled tylenol. PRN ibuprofen. PRN oxycodone available, but patient managing pain without narcotic. S/p TAP block  - Heme: Acute blood loss anemia, expected secondary to surgical blood loss. Hgb 12.0 > QBL 2628 > 9.1. Asymptomatic. Will discharge home with PO iron. Preoperative blood loss contributed to blood loss estimated from procedure.   - CV: Two mild range blood pressures in immediate postoperative period. Normotensive since.  - : Low UOP from POD#0-1, resolved. Voiding spontaneously s/p ulrich.  - GI: Regular diet. Continue stool softeners. Anti-emetics PRN.  - Rh positive. Rubella immune.  - Pumping for baby in NICU.  - Partner planning vasectomy for birth control      Anticipate discharge to home today.    # Pain Assessment:  Current Pain Score 7/25/2018   Patient currently in pain? denies   Pain  score (0-10) -   Pain location -   Pain descriptors -   - Enid is experiencing pain due to recent  delivery. Pain management was discussed and the plan was created in a collaborative fashion.  Enid's response to the current recommendations: engaged  - Please see the plan for pain management as documented above    Rosalina Duggan, PGY3  OB/GYN Resident  2018    Staff MD Note    I appreciate the note by Dr. Duggan.  Any necessary changes have been made by me.  I evaluated the patient with the resident and agree with the assessment and plan.    Charito Lynn MD

## 2018-08-11 ENCOUNTER — LACTATION ENCOUNTER (OUTPATIENT)
Age: 38
End: 2018-08-11

## 2018-08-11 NOTE — LACTATION NOTE
"This note was copied from a baby's chart.  Discharge Instructions for Enid and Arnie:    Make sure baby is eating at least 8 times a day, has at least 6-8 wet diapers in 24 hours, and 4 stools in 24 hours, to show adequate intake.      Birth Control and Other Medications: Avoid hormonal birth control for as long as possible and until your milk supply is well established, as it may impact your supply.  Some women also find decongestants and antihistamines may impact supply.  Always get a second opinion from a lactation consultant if told to \"pump and dump\" when starting a new medication; most medications are compatible.    Paced Bottlefeeding: Continue to use paced bottlefeeding techniques, even when your baby is bigger and stronger, to prevent overeating. A bottlefeeding should take 20-30 minutes, just like an adult's meal. You may need to show caregivers (, grandparents, etc) this technique.  Please let us know if you'd like information on direct breastfeeding in the future; remember this is always an option if you decide to resume it.    Outpatient lactation resources:   [facility] Out[facility] NICU Lact[facility]   [phone]  Breastfeeding Connection at [facility]  [phone]   Breastfeeding Connection at [facility]   [phone]  [facility] L[facility]    [phone]  [facility]       [phone]  [facility]      [phone]  [facility]      [phone]  [facility]      [phone]    [facility]       [phone]    BabyCafes (www.babycafeusa.org):  [facility] [place] (Wed 12:30-2:30)     [phone].  [facility] [place] (Thurs 12:30-2:30)    [phone].  [facility] [place] (Tuesday 9:30-11:30)   [phone].  [facility] [place] (Wednesdays (1:30-3p)    [phone].  [facility] [place] (Mondays 12n-2p)    [phone].  [facility] [place] (Wed " 12:30p-2:30p)   [phone].  [facility] [place] (Wednesdays 10a-12n    [phone].  [facility] [place] (Mondays 10a-12n)    [phone].  [facility] [place] (Tuesday 10a-12n)    [phone].    Other Walk-In Lactaton Help:  [facility]/ Kerry Rutledge (Tues/Wed)   [phone]  [facility] (Thurs 2:30-3:30)   [phone]  [facility] Baby Weigh In (various times and locations)  www.YouSticker            [facility] Lactation Support:  [facility] Outpatient Lactation Clinics Phone: [phone]  Locations: [facility], [facility], [facility] clinics  Clinic hours: Monday - Friday 8 am to 4 pm - Closed all major holidays.  Phone calls answered: Monday - Friday between 9 am and 2 pm.  Phone calls after hours: Leave a message and your call will be returned the next business  day. You can also talk with a [facility] Care Connection Triage Nurse by calling [phone].   [facility] Home Care: home nurse visit for mother band baby: [phone]    Other Resources:  WIC (call for eligibility information)     [phone]    La Leche League International   www.DataNitroli.org  2-360-3-LA-LECHE ([phone])    Office on Women's Health National Breastfeeding Help Line  8am to 5pm, English and Finnish 1-814.786.1825 option 1  https://www.womenshealth.gov/breastfeeding/   International Breastfeeding [place])-- http://ibconline.ca/  Alisha-- up to date lactation information: www.Qbox.io.St[place]itz  Drugs and lactation database:  https://toxnet.nlm.nih.gov/newtoxnet/lactmed.htm   The [facility] Call Center is available to answer questions about the use of medications during pregnancy and while breastfeeding. [phone] www.Cinegif     Felisha Mix RNC, IBCLC/ Gloria Wilde RNC, IBCLC/ Ramona Slaughter RNC, IBCLC [phone]

## 2018-08-13 ENCOUNTER — TELEPHONE (OUTPATIENT)
Dept: FAMILY MEDICINE | Facility: CLINIC | Age: 38
End: 2018-08-13

## 2018-08-13 NOTE — TELEPHONE ENCOUNTER
----- Message from Enid Lebron sent at 2018  9:11 AM CDT -----  Regarding: Appointment scheduled from [facility]  Contact: [phone]  Appointment For: ENID LEBRON ([id number])  Visit Type: [facility] OB GYN RETURN (1466)    2018   11:30 AM  15 mins.  Dorothy Alatorre PA-C  [facility]    Patient Comments:  Obstetrics & Gynecology Care  6 week  follow up

## 2018-08-13 NOTE — TELEPHONE ENCOUNTER
Please see [place] [email or web address] message below. Pt scheduled herself an appt thru [employer].  Not sure if it scheduled with correct amount of time. Please contact patient. Thanks

## 2018-08-27 LAB — COPATH REPORT: NORMAL

## 2018-09-12 ENCOUNTER — PRENATAL OFFICE VISIT (OUTPATIENT)
Dept: FAMILY MEDICINE | Facility: CLINIC | Age: 38
End: 2018-09-12
Payer: COMMERCIAL

## 2018-09-12 VITALS
DIASTOLIC BLOOD PRESSURE: 70 MMHG | OXYGEN SATURATION: 98 % | SYSTOLIC BLOOD PRESSURE: 116 MMHG | TEMPERATURE: 96.1 F | HEART RATE: 84 BPM | RESPIRATION RATE: 18 BRPM | BODY MASS INDEX: 32.42 KG/M2 | WEIGHT: 207 LBS

## 2018-09-12 DIAGNOSIS — Z98.891 S/P C-SECTION: ICD-10-CM

## 2018-09-12 PROBLEM — Z36.89 ENCOUNTER FOR TRIAGE IN PREGNANT PATIENT: Status: RESOLVED | Noted: 2018-06-30 | Resolved: 2018-09-12

## 2018-09-12 PROBLEM — N93.9 VAGINAL BLEEDING: Status: RESOLVED | Noted: 2018-06-19 | Resolved: 2018-09-12

## 2018-09-12 PROBLEM — O09.529 SUPERVISION OF HIGH-RISK PREGNANCY OF ELDERLY MULTIGRAVIDA: Status: RESOLVED | Noted: 2018-03-08 | Resolved: 2018-09-12

## 2018-09-12 PROBLEM — O44.00 PLACENTA PREVIA: Status: RESOLVED | Noted: 2018-06-30 | Resolved: 2018-09-12

## 2018-09-12 PROBLEM — O26.20 HABITUAL ABORTER, CURRENTLY PREGNANT: Status: RESOLVED | Noted: 2018-01-03 | Resolved: 2018-09-12

## 2018-09-12 PROBLEM — O44.00 COMPLETE PLACENTA PREVIA NOS OR WITHOUT HEMORRHAGE, UNSPECIFIED TRIMESTER: Status: RESOLVED | Noted: 2018-04-26 | Resolved: 2018-09-12

## 2018-09-12 PROBLEM — R58 BLEEDING: Status: RESOLVED | Noted: 2018-06-21 | Resolved: 2018-09-12

## 2018-09-12 LAB — HGB BLD-MCNC: 13.2 G/DL (ref 11.7–15.7)

## 2018-09-12 PROCEDURE — 36415 COLL VENOUS BLD VENIPUNCTURE: CPT | Performed by: PHYSICIAN ASSISTANT

## 2018-09-12 PROCEDURE — 00000218 ZZHCL STATISTIC OBHBG - HEMOGLOBIN: Performed by: PHYSICIAN ASSISTANT

## 2018-09-12 PROCEDURE — 99207 ZZC POST PARTUM EXAM: CPT | Performed by: PHYSICIAN ASSISTANT

## 2018-09-12 ASSESSMENT — PAIN SCALES - GENERAL: PAINLEVEL: NO PAIN (0)

## 2018-09-12 NOTE — MR AVS SNAPSHOT
After Visit Summary   2018    Enid Cisneros    MRN: [id number]           Patient Information     Date Of Birth          1980        Visit Information        Provider Department      2018 9:00 AM Dorothy Alatorre PA-C [facility]        Today's Diagnoses     Routine postpartum follow-up    -  1    S/P            Follow-ups after your visit        Who to contact     If you have questions or need follow up information about today's clinic visit or your schedule please contact [facility] directly at [phone].  Normal or non-critical lab and imaging results will be communicated to you by Airpoweredhart, letter or phone within 4 business days after the clinic has received the results. If you do not hear from us within 7 days, please contact the clinic through IActionablet or phone. If you have a critical or abnormal lab result, we will notify you by phone as soon as possible.  Submit refill requests through [employer] or call your pharmacy and they will forward the refill request to us. Please allow 3 business days for your refill to be completed.          Additional Information About Your Visit        MyChart Information     [employer] gives you secure access to your electronic health record. If you see a primary care provider, you can also send messages to your care team and make appointments. If you have questions, please call your primary care clinic.  If you do not have a primary care provider, please call [phone] and they will assist you.        Care EveryWhere ID     This is your Care EveryWhere ID. This could be used by other organizations to access your [place] medical records  [id number]        Your Vitals Were     Pulse Temperature Respirations Last Period Pulse Oximetry BMI (Body Mass Index)    84 96.1  F (35.6  C) (Temporal) 18 2017 (Exact Date) 98% 32.42 kg/m2       Blood Pressure from Last 3 Encounters:   18 116/70   18  104/74   18 122/72    Weight from Last 3 Encounters:   18 207 lb (93.9 kg)   18 220 lb 3.2 oz (99.9 kg)   18 215 lb (97.5 kg)              We Performed the Following     OB HEMOGLOBIN        Primary Care Provider Office Phone # Fax #    Dorothy Alatorre PA-C [phone] [address] [place] [address]        Equal Access to Services     DAMON VALLES : Hadii aad ku hadasho Soomaali, waaxda luqadaha, qaybta kaalmada adeegyada, waxay idiin hayaan adeeg kharacriselda laramone . So [facility] [phone].    ATENCIÓN: Si habla español, tiene a hines disposición servicios gratuitos de asistencia lingüística. [facility] [phone].    We comply with applicable federal civil rights laws and [place] laws. We do not discriminate on the basis of race, color, national origin, age, disability, sex, sexual orientation, or gender identity.            Thank you!     Thank you for choosing [facility]  for your care. Our goal is always to provide you with excellent care. Hearing back from our patients is one way we can continue to improve our services. Please take a few minutes to complete the written survey that you may receive in the mail after your visit with us. Thank you!             Your Updated Medication List - Protect others around you: Learn how to safely use, store and throw away your medicines at www.disposemymeds.org.          This list is accurate as of 18  9:40 AM.  Always use your most recent med list.                   Brand Name Dispense Instructions for use Diagnosis    acetaminophen 325 MG tablet    TYLENOL    100 tablet    Take 2 tablets (650 mg) by mouth every 4 hours as needed for pain    S/P        ferrous gluconate 324 (38 Fe) MG tablet    FERGON    100 tablet    Take 1 tablet (324 mg) by mouth daily (with breakfast)    S/P        ibuprofen 600 MG tablet    ADVIL/MOTRIN    120 tablet    Take 1 tablet (600 mg) by mouth every 6 hours  as needed for pain    S/P        senna-docusate 8.6-50 MG per tablet    SENOKOT-S;PERICOLACE    60 tablet    Take 1 tablet by mouth 2 times daily as needed for constipation    S/P

## 2018-09-12 NOTE — NURSING NOTE
"Chief Complaint   Patient presents with     Post Partum Exam       Initial /70  Pulse 84  Temp 96.1  F (35.6  C) (Temporal)  Resp 18  Wt 207 lb (93.9 kg)  LMP 11/23/2017 (Exact Date)  SpO2 98%  BMI 32.42 kg/m2 Estimated body mass index is 32.42 kg/(m^2) as calculated from the following:    Height as of 3/8/18: 5' 7\" (1.702 m).    Weight as of this encounter: 207 lb (93.9 kg).  BP completed using cuff size: manda Arevalo MA      "

## 2018-09-12 NOTE — PROGRESS NOTES
Enid is here for a 6-week postpartum checkup.    She had a c/s for maternal complications including placenta previa and bleeding (36 weeks gestation)  of a viable boy, weight 7 pounds 4 oz.  Baby spent several weeks in the NICU at the [facility] where he was delivered. Date of delivery was 2018. Since delivery, she pumping & bottle feeding (no formula).  She has no signs of infection, bleeding or other complications.  She is not pregnant.  We discussed contraceptions and she has chosen vasectomy.      Post partum tubal: No  History of Gestational Diabetes? No  Type of Delivery:    Feeding Method:  Pumping  If initiated breast feeding and stopped, how long did you breast feed?:      REVIEW OF SYSTEMS:  Ears/Nose/Throat: negative  Respiratory: negative  Cardiovascular: negative  Gastrointestinal: negative  Genitourinary: negative  Musculoskeletal: negative    Neurologic: negative   Skin: negative   Endocrine:  negative    Past Medical History:   Diagnosis Date     Closed fracture of unspecified part of humerus     fx of radius     Lyme disease     history of     PAC (premature atrial contraction) 2013    Holter     PVC (premature ventricular contraction) 2013    Holter     Past Surgical History:   Procedure Laterality Date     ------------OTHER-------------      Lipoma removal      SECTION N/A 2018    Procedure:  SECTION;  Primary  Section ;  Surgeon: Ana Rosa Arellano MD;  Location: [facility] L[facility]D     LAMINECT/DISCECTOMY, LUMBAR  2002     TONSILLECTOMY       Social History   Substance Use Topics     Smoking status: Never Smoker     Smokeless tobacco: Never Used     Alcohol use No     Family History   Problem Relation Age of Onset     Cancer Mother      breast     No Known Problems Father      Cerebrovascular Disease Maternal Grandmother      Diabetes Maternal Grandmother      adult-onset     HEART DISEASE Maternal Grandfather      Cancer Paternal Grandmother       HEART DISEASE Paternal Grandfather      No Known Problems Brother         Allergies   Allergen Reactions     Clindamycin Rash     Current Outpatient Prescriptions   Medication Sig Dispense Refill     ferrous gluconate (FERGON) 324 (38 Fe) MG tablet Take 1 tablet (324 mg) by mouth daily (with breakfast) 100 tablet 0     senna-docusate (SENOKOT-S;PERICOLACE) 8.6-50 MG per tablet Take 1 tablet by mouth 2 times daily as needed for constipation 60 tablet 0     acetaminophen (TYLENOL) 325 MG tablet Take 2 tablets (650 mg) by mouth every 4 hours as needed for pain (Patient not taking: Reported on 2018) 100 tablet 0     ibuprofen (ADVIL/MOTRIN) 600 MG tablet Take 1 tablet (600 mg) by mouth every 6 hours as needed for pain (Patient not taking: Reported on 2018) 120 tablet 0         EXAM:  LMP 2017 (Exact Date)  HEENT: grossly normal.  NECK: no lymphadenopathy or thyroidomegaly.  LUNGS: CTA X 2, no rales or crackles.  BACK: No spinal or CVA tenderness.  HEART: RRR without murmurs clicks or gallops.  ABDOMEN: soft, non tender, good bowel sounds, without masses rebound, guarding or tenderness.  PELVIC:  External genitalia; normal without lesion, repair well healed.   Vagina: normal mucosa and rugae, no discharge.  Cervix: multiparous, well healed, without lesion.  Uterus: non pregnant in size, firm , mobile, no lesions,     Normal shape, position and consistency  Adnexa: non tender, without masses.    EXTREMITIES:  warm to touch, good pulses, no ankle edema or calf tenderness.  NEUROLOGIC: grossly normal.    ASSESSMENT:      Routine postpartum follow-up  S/P       PLAN:    Discussed calcium intake, vitamins and supplements  Exercise encouraged  Follow up in 1 year  Hemoglobin obtained.    Orders Placed This Encounter     OB HEMOGLOBIN       AVS given to patient upon discharge today.  Electronically signed by Dorothy Alatorre PA-C  2018  9:37 AM

## 2019-06-27 ENCOUNTER — OFFICE VISIT (OUTPATIENT)
Dept: URGENT CARE | Facility: RETAIL CLINIC | Age: 39
End: 2019-06-27
Payer: COMMERCIAL

## 2019-06-27 VITALS
OXYGEN SATURATION: 95 % | TEMPERATURE: 98.2 F | HEART RATE: 103 BPM | DIASTOLIC BLOOD PRESSURE: 70 MMHG | SYSTOLIC BLOOD PRESSURE: 123 MMHG

## 2019-06-27 DIAGNOSIS — L02.415 CUTANEOUS ABSCESS OF RIGHT LOWER EXTREMITY: Primary | ICD-10-CM

## 2019-06-27 PROCEDURE — 99213 OFFICE O/P EST LOW 20 MIN: CPT | Performed by: FAMILY MEDICINE

## 2019-06-27 RX ORDER — CEPHALEXIN 500 MG/1
500 CAPSULE ORAL 3 TIMES DAILY
Qty: 30 CAPSULE | Refills: 0 | Status: SHIPPED | OUTPATIENT
Start: 2019-06-27 | End: 2019-07-07

## 2019-06-27 NOTE — PROGRESS NOTES
SUBJECTIVE:  Patient presents with a sore and on her rt thigh for two days and enlarging rapidly.  No known injury or similar lesion.    Past Medical History:   Diagnosis Date     Closed fracture of unspecified part of humerus 1981    fx of radius     Lyme disease     history of     PAC (premature atrial contraction) 8/2013    Holter     PVC (premature ventricular contraction) 8/2013    Holter     Current Outpatient Medications   Medication Sig Dispense Refill     cephALEXin (KEFLEX) 500 MG capsule Take 1 capsule (500 mg) by mouth 3 times daily for 10 days 30 capsule 0     acetaminophen (TYLENOL) 325 MG tablet Take 2 tablets (650 mg) by mouth every 4 hours as needed for pain (Patient not taking: Reported on 9/12/2018) 100 tablet 0     ferrous gluconate (FERGON) 324 (38 Fe) MG tablet Take 1 tablet (324 mg) by mouth daily (with breakfast) (Patient not taking: Reported on 6/27/2019) 100 tablet 0     History   Smoking Status     Never Smoker   Smokeless Tobacco     Never Used         OBJECITVE;  /70   Pulse 103   Temp 98.2  F (36.8  C) (Oral)   SpO2 95%   Alert oriented and slightly apprehensive.  Skin:  Tender red area with slight fluctuance rt inner thigh.  Total area of redness approx 3 cm  ASSESSMENT:  Early skin abscess etiology unclear    PLAN:  Symptomatic therapy suggested: printed information on abscess including warm moist soaks.  Cephalexin 500 mg tid.  Appt made for patient to recheck in clinic in four days.  May need drainage.  Follow up with primary care provider if no improvement.

## 2019-09-28 ENCOUNTER — HEALTH MAINTENANCE LETTER (OUTPATIENT)
Age: 39
End: 2019-09-28

## 2019-12-08 ENCOUNTER — HEALTH MAINTENANCE LETTER (OUTPATIENT)
Age: 39
End: 2019-12-08

## 2020-01-17 ENCOUNTER — OFFICE VISIT (OUTPATIENT)
Dept: FAMILY MEDICINE | Facility: CLINIC | Age: 40
End: 2020-01-17
Payer: COMMERCIAL

## 2020-01-17 VITALS
WEIGHT: 197.1 LBS | DIASTOLIC BLOOD PRESSURE: 70 MMHG | BODY MASS INDEX: 29.87 KG/M2 | HEART RATE: 125 BPM | SYSTOLIC BLOOD PRESSURE: 110 MMHG | TEMPERATURE: 96.3 F | RESPIRATION RATE: 18 BRPM | HEIGHT: 68 IN | OXYGEN SATURATION: 100 %

## 2020-01-17 DIAGNOSIS — F40.243 ANXIETY WITH FLYING: Primary | ICD-10-CM

## 2020-01-17 PROCEDURE — 99213 OFFICE O/P EST LOW 20 MIN: CPT | Performed by: NURSE PRACTITIONER

## 2020-01-17 RX ORDER — LORAZEPAM 0.5 MG/1
1 TABLET ORAL EVERY 6 HOURS PRN
Qty: 15 TABLET | Refills: 0 | Status: SHIPPED | OUTPATIENT
Start: 2020-01-17 | End: 2024-03-19

## 2020-01-17 ASSESSMENT — ANXIETY QUESTIONNAIRES
6. BECOMING EASILY ANNOYED OR IRRITABLE: SEVERAL DAYS
2. NOT BEING ABLE TO STOP OR CONTROL WORRYING: SEVERAL DAYS
7. FEELING AFRAID AS IF SOMETHING AWFUL MIGHT HAPPEN: SEVERAL DAYS
3. WORRYING TOO MUCH ABOUT DIFFERENT THINGS: NOT AT ALL
GAD7 TOTAL SCORE: 4
5. BEING SO RESTLESS THAT IT IS HARD TO SIT STILL: NOT AT ALL
1. FEELING NERVOUS, ANXIOUS, OR ON EDGE: SEVERAL DAYS
IF YOU CHECKED OFF ANY PROBLEMS ON THIS QUESTIONNAIRE, HOW DIFFICULT HAVE THESE PROBLEMS MADE IT FOR YOU TO DO YOUR WORK, TAKE CARE OF THINGS AT HOME, OR GET ALONG WITH OTHER PEOPLE: NOT DIFFICULT AT ALL

## 2020-01-17 ASSESSMENT — PATIENT HEALTH QUESTIONNAIRE - PHQ9: 5. POOR APPETITE OR OVEREATING: NOT AT ALL

## 2020-01-17 ASSESSMENT — PAIN SCALES - GENERAL: PAINLEVEL: NO PAIN (0)

## 2020-01-17 ASSESSMENT — MIFFLIN-ST. JEOR: SCORE: 1609.6

## 2020-01-17 NOTE — PROGRESS NOTES
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 She has taken low dose Ativan for years which is helpful. Rarely fills this but has some up coming trips and needs a refill to avoid panic attacks. She has some anxiety she manages with distraction and meditation daily, does not feel needs a daily medication for this. No acute symptoms of concern. No new fevers or chills. No new headaches or visual changes. No chest pains or palpitations. No new or worsening depression or anxiety.     HPI   Anxiety Follow-Up    How are you doing with your anxiety since your last visit? No change    Are you having other symptoms that might be associated with anxiety? Panic attacks while flying     Have you had a significant life event? No     Are you feeling depressed? No    Do you have any concerns with your use of alcohol or other drugs? No    Social History     Tobacco Use     Smoking status: Never Smoker     Smokeless tobacco: Never Used   Substance Use Topics     Alcohol use: No     Drug use: No             Patient Active Problem List   Diagnosis     CARDIOVASCULAR SCREENING; LDL GOAL LESS THAN 160     PAC (premature atrial contraction)     PVC (premature ventricular contraction)     S/P      Past Surgical History:   Procedure Laterality Date     ------------OTHER-------------      Lipoma removal      SECTION N/A 2018    Procedure:  SECTION;  Primary  Section ;  Surgeon: Ana Rosa Arellano MD;  Location:  L+D     LAMINECT/DISCECTOMY, LUMBAR  2002     TONSILLECTOMY         Social History     Tobacco Use     Smoking status: Never Smoker     Smokeless tobacco: Never Used   Substance Use Topics     Alcohol use: No     Family History   Problem Relation Age of Onset     Cancer Mother         breast     No Known Problems Father       "Cerebrovascular Disease Maternal Grandmother      Diabetes Maternal Grandmother         adult-onset     Heart Disease Maternal Grandfather      Cancer Paternal Grandmother      Heart Disease Paternal Grandfather      No Known Problems Brother          Current Outpatient Medications   Medication Sig Dispense Refill     LORazepam (ATIVAN) 0.5 MG tablet Take 2 tablets (1 mg) by mouth every 6 hours as needed for anxiety 15 tablet 0     acetaminophen (TYLENOL) 325 MG tablet Take 2 tablets (650 mg) by mouth every 4 hours as needed for pain (Patient not taking: Reported on 9/12/2018) 100 tablet 0     ferrous gluconate (FERGON) 324 (38 Fe) MG tablet Take 1 tablet (324 mg) by mouth daily (with breakfast) (Patient not taking: Reported on 6/27/2019) 100 tablet 0     Allergies   Allergen Reactions     Clindamycin Rash         Reviewed and updated as needed this visit by Provider         Review of Systems         Objective    /70   Pulse 125   Temp 96.3  F (35.7  C) (Temporal)   Resp 18   Ht 1.715 m (5' 7.5\")   Wt 89.4 kg (197 lb 1.6 oz)   LMP 01/01/2020   SpO2 100%   BMI 30.41 kg/m    Body mass index is 30.41 kg/m .  Physical Exam  Vitals signs reviewed.   Constitutional:       Appearance: Normal appearance.   HENT:      Head: Normocephalic and atraumatic.   Eyes:      Extraocular Movements: Extraocular movements intact.   Neck:      Musculoskeletal: Normal range of motion.   Cardiovascular:      Rate and Rhythm: Normal rate.      Pulses: Normal pulses.   Pulmonary:      Effort: Pulmonary effort is normal.   Skin:     General: Skin is warm and dry.   Neurological:      General: No focal deficit present.      Mental Status: She is alert and oriented to person, place, and time.   Psychiatric:         Mood and Affect: Mood normal.         Thought Content: Thought content normal.         Judgment: Judgment normal.              Assessment & Plan     1. Anxiety with flying  - This has worked well for her in the past, " will continue with current plan of car.e   - LORazepam (ATIVAN) 0.5 MG tablet; Take 2 tablets (1 mg) by mouth every 6 hours as needed for anxiety  Dispense: 15 tablet; Refill: 0     - She is due for a physical, we will get this set up. Plan at this point will be to establish care with our team.       Luis Nichols NP  [facility]

## 2020-01-18 ASSESSMENT — ANXIETY QUESTIONNAIRES: GAD7 TOTAL SCORE: 4

## 2020-07-13 ENCOUNTER — VIRTUAL VISIT (OUTPATIENT)
Dept: FAMILY MEDICINE | Facility: OTHER | Age: 40
End: 2020-07-13
Payer: COMMERCIAL

## 2020-07-13 PROCEDURE — 99421 OL DIG E/M SVC 5-10 MIN: CPT | Performed by: PHYSICIAN ASSISTANT

## 2020-07-13 NOTE — PROGRESS NOTES
"Date: 2020 18:24:01  Clinician: Arun Ramires  Clinician NPI: [id number]  Patient: Enid Cisneros  Patient : 1980  Patient Address: [address] [place] [address]  Patient Phone: [phone]  Visit Protocol: URI  Patient Summary:  Enid is a 40 year old ( : 1980 ) female who initiated a Visit for COVID-19 (Coronavirus) evaluation and screening. When asked the question \"Please sign me up to receive news, health information and promotions from [employer].\", Enid responded \"No\".    Enid states her symptoms started 1-2 days ago.   Her symptoms consist of malaise, a headache, a sore throat, myalgia, facial pain or pressure, and a cough. She is experiencing mild difficulty breathing with activities but can speak normally in full sentences.   Symptom details     Cough: Enid coughs a few times an hour and her cough is more bothersome at night. Phlegm does not come into her throat when she coughs. She does not believe her cough is caused by post-nasal drip.     Sore throat: Enid reports having moderate throat pain (4-6 on a 10 point pain scale), does not have exudate on her tonsils, and can swallow liquids. She is not sure if the lymph nodes in her neck are enlarged. A rash has not appeared on the skin since the sore throat started.     Facial pain or pressure: The facial pain or pressure feels worse when bending over or leaning forward.     Headache: She states the headache is mild (1-3 on a 10 point pain scale).      Enid denies having wheezing, nausea, teeth pain, ageusia, diarrhea, vomiting, rhinitis, ear pain, chills, anosmia, fever, and nasal congestion. She also denies having recent facial or sinus surgery in the past 60 days and taking antibiotic medication in the past month.   Precipitating events  Within the past week, Enid has not been exposed to someone with strep throat. She has not recently been exposed to someone with influenza. Enid has been in close contact with the " following high risk individuals: pregnant women, children under the age of 5, and adults 65 or older.   Pertinent COVID-19 (Coronavirus) information  In the past 14 days, Enid has not worked in a congregate living setting.   She does not work or volunteer as healthcare worker or a  and does not work or volunteer in a healthcare facility.   Enid also has not lived in a congregate living setting in the past 14 days. She does not live with a healthcare worker.   Enid has not had a close contact with a laboratory-confirmed COVID-19 patient within 14 days of symptom onset.   Pertinent medical history  Enid does not get yeast infections when she takes antibiotics.   Enid needs a return to work/school note.   Weight: 197 lbs   Enid does not smoke or use smokeless tobacco.   She denies pregnancy and denies breastfeeding. She has menstruated in the past month.   Weight: 197 lbs    MEDICATIONS: No current medications, ALLERGIES: clindamycin  Clinician Response:  Dear Enid,   Your symptoms show that you may have coronavirus (COVID-19). This illness can cause fever, cough and trouble breathing. Many people get a mild case and get better on their own. Some people can get very sick.  What should I do?  We would like to test you for this virus.   1. Please call [phone] to schedule your visit. Explain that [facility] to have a COVID-19 test. Be ready to share your [facility] visit ID number.  The following will serve as your written order for this COVID Test, ordered by me, for the indication of suspected COVID [Z20.828]: The test will be ordered in [employer], our electronic health record, after you are scheduled. It will show as ordered and authorized by Timbo Baxter MD.  Order: COVID-19 (Coronavirus) PCR for SYMPTOMATIC testing from [facility].      2. When it's time for your COVID test:  Stay at least 6 feet away from others. (If someone will drive you to your test, stay in the backseat, as far away  "from the  as you can.)   Cover your mouth and nose with a mask, tissue or washcloth.  Go straight to the testing site. Don't make any stops on the way there or back.      3.Starting now: Stay home and away from others (self-isolate) until:   You've had no fever---and no medicine that reduces fever---for 3 full days (72 hours). And...   Your other symptoms have gotten better. For example, your cough or breathing has improved. And...   At least 10 days have passed since your symptoms started.       During this time, don't leave the house except for testing or medical care.   Stay in your own room, even for meals. Use your own bathroom if you can.   Stay away from others in your home. No hugging, kissing or shaking hands. No visitors.  Don't go to work, school or anywhere else.    Clean \"high touch\" surfaces often (doorknobs, counters, handles, etc.). Use a household cleaning spray or wipes. You'll find a full list of  on the EPA website: www.epa.gov/pesticide-registration/list-n-disinfectants-use-against-sars-cov-2.   Cover your mouth and nose with a mask, tissue or washcloth to avoid spreading germs.  Wash your hands and face often. Use soap and water.  Caregivers in these groups are at risk for severe illness due to COVID-19:  o People 65 years and older  o People who live in a nursing home or long-term care facility  o People with chronic disease (lung, heart, cancer, diabetes, kidney, liver, immunologic)  o People who have a weakened immune system, including those who:   Are in cancer treatment  Take medicine that weakens the immune system, such as corticosteroids  Had a bone marrow or organ transplant  Have an immune deficiency  Have poorly controlled HIV or AIDS  Are obese (body mass index of 40 or higher)  Smoke regularly   o Caregivers should wear gloves while washing dishes, handling laundry and cleaning bedrooms and bathrooms.  o Use caution when washing and drying laundry: Don't shake dirty " laundry, and use the warmest water setting that you can.  o For more tips, go to www.cdc.gov/coronavirus/2019-ncov/downloads/10Things.pdf.    4.Sign up for [employer]. We know it's scary to hear that you might have COVID-19. We want to track your symptoms to make sure you're okay over the next 2 weeks. Please look for an email from [employer]---this is a free, online program that we'll use to keep in touch. To sign up, follow the link in the email. Learn more at [email or web address]  How can I take care of myself?   Get lots of rest. Drink extra fluids (unless a doctor has told you not to).   Take Tylenol (acetaminophen) for fever or pain. If you have liver or kidney problems, ask your family doctor if it's okay to take Tylenol.   Adults can take either:    650 mg (two 325 mg pills) every 4 to 6 hours, or...   1,000 mg (two 500 mg pills) every 8 hours as needed.    Note: Don't take more than 3,000 mg in one day. Acetaminophen is found in many medicines (both prescribed and over-the-counter medicines). Read all labels to be sure you don't take too much.   For children, check the Tylenol bottle for the right dose. The dose is based on the child's age or weight.    If you have other health problems (like cancer, heart failure, an organ transplant or severe kidney disease): Call your specialty clinic if you don't feel better in the next 2 days.       Know when to call 911. Emergency warning signs include:    Trouble breathing or shortness of breath Pain or pressure in the chest that doesn't go away Feeling confused like you haven't felt before, or not being able to wake up Bluish-colored lips or face.  Where can I get more information?    [employer] -- About COVID-19: [email or web address]/covid19/   CDC -- What to Do If You're Sick: www.cdc.gov/coronavirus/2019-ncov/about/steps-when-sick.html   CDC -- Ending Home Isolation: www.cdc.gov/coronavirus/2019-ncov/hcp/disposition-in-home-patients.html    CDC -- Caring for Someone: www.cdc.gov/coronavirus/2019-ncov/if-you-are-sick/care-for-someone.html   [facility] -- Interim Guidance for Hospital Discharge to Home: www.health.[facility].[place]/diseases/coronavirus/hcp/hospdischarge.pdf   [facility] clinical trials (COVID-19 research studies): clinicalaffairs.[facility]-clinical-trials    Below are the COVID-19 hotlines at the [place] Department of Health ([facility]). Interpreters are available.    For health questions: Call 525-350-5203 or 1-790.191.5586 (7 a.m. to 7 p.m.) For questions about schools and childcare: Call 982-192-5654 or 1-933.135.8416 (7 a.m. to 7 p.m.)    Diagnosis: Pain in throat  Diagnosis ICD: R07.0

## 2020-07-14 DIAGNOSIS — Z20.822 SUSPECTED COVID-19 VIRUS INFECTION: Primary | ICD-10-CM

## 2020-07-15 DIAGNOSIS — Z20.822 SUSPECTED COVID-19 VIRUS INFECTION: ICD-10-CM

## 2020-07-15 PROCEDURE — U0003 INFECTIOUS AGENT DETECTION BY NUCLEIC ACID (DNA OR RNA); SEVERE ACUTE RESPIRATORY SYNDROME CORONAVIRUS 2 (SARS-COV-2) (CORONAVIRUS DISEASE [COVID-19]), AMPLIFIED PROBE TECHNIQUE, MAKING USE OF HIGH THROUGHPUT TECHNOLOGIES AS DESCRIBED BY CMS-2020-01-R: HCPCS | Performed by: FAMILY MEDICINE

## 2020-07-16 LAB
SARS-COV-2 RNA SPEC QL NAA+PROBE: NOT DETECTED
SPECIMEN SOURCE: NORMAL

## 2021-01-10 ENCOUNTER — HEALTH MAINTENANCE LETTER (OUTPATIENT)
Age: 41
End: 2021-01-10

## 2021-09-27 NOTE — PROGRESS NOTES
COVID-19 PCR test completed. Patient handout For Patients Who Have Been Tested for Covid-19 (Coronavirus) was given to the patient, which includes test result notification process.     What Type Of Note Output Would You Prefer (Optional)?: Standard Output Hpi Title: Evaluation of Skin Lesions How Severe Are Your Spot(S)?: moderate Have Your Spot(S) Been Treated In The Past?: has not been treated

## 2021-10-23 ENCOUNTER — HEALTH MAINTENANCE LETTER (OUTPATIENT)
Age: 41
End: 2021-10-23

## 2022-02-12 ENCOUNTER — HEALTH MAINTENANCE LETTER (OUTPATIENT)
Age: 42
End: 2022-02-12

## 2022-04-23 ENCOUNTER — APPOINTMENT (OUTPATIENT)
Dept: GENERAL RADIOLOGY | Facility: CLINIC | Age: 42
End: 2022-04-23
Attending: PHYSICIAN ASSISTANT
Payer: COMMERCIAL

## 2022-04-23 ENCOUNTER — HOSPITAL ENCOUNTER (EMERGENCY)
Facility: CLINIC | Age: 42
Discharge: HOME | End: 2022-04-23
Attending: PHYSICIAN ASSISTANT | Admitting: PHYSICIAN ASSISTANT
Payer: COMMERCIAL

## 2022-04-23 VITALS
OXYGEN SATURATION: 96 % | TEMPERATURE: 98.5 F | SYSTOLIC BLOOD PRESSURE: 124 MMHG | DIASTOLIC BLOOD PRESSURE: 81 MMHG | RESPIRATION RATE: 18 BRPM | WEIGHT: 200 LBS | BODY MASS INDEX: 30.86 KG/M2 | HEART RATE: 91 BPM

## 2022-04-23 DIAGNOSIS — M77.52 BURSITIS OF HEEL, LEFT: ICD-10-CM

## 2022-04-23 PROCEDURE — 99282 EMERGENCY DEPT VISIT SF MDM: CPT | Performed by: PHYSICIAN ASSISTANT

## 2022-04-23 PROCEDURE — 99283 EMERGENCY DEPT VISIT LOW MDM: CPT | Performed by: PHYSICIAN ASSISTANT

## 2022-04-23 PROCEDURE — 73630 X-RAY EXAM OF FOOT: CPT | Mod: LT

## 2022-04-23 NOTE — DISCHARGE INSTRUCTIONS
I suspect you have bursitis in your heel. Please apply ice and try to rest the heel for the next week. Take ibuprofen 600 mg 3 times a day to help reduce inflammation.  You could try a heel lift orthotic which could in your shoe to see if that helps.  Try to wear shoes with soft backing to prevent rubbing on the heel.  A referral to podiatry was also provided and they should call to schedule an appointment for you in the next week.  If you do have any worsening concerns in the meantime please return to the emergency department.    Thank you for choosing [facility]'s Emergency Department. It was a pleasure taking care of you today. If you have any questions, please call [phone].    Pattie Muller PA-C

## 2022-04-23 NOTE — ED PROVIDER NOTES
History     Chief Complaint   Patient presents with     Foot Pain       HPI  Enid Cisneros is a 42 year old female who presents to the emergency department complaining of left foot pain. The patient reports 2 days ago she was participating in a workout that involves a lot of jumping, side shuffling, and cardio activity.  Afterwards she had pain in her heel/posterior ankle region.  Over the last 2 days the pain is gotten significantly worse where she is unable to even bear much weight on it.  She has had to walk on her toes but even that is painful now.  She has been taking ibuprofen, most recently around 11 AM.  She denies any direct trauma to the foot.  No numbness in the foot.  Denies any other injuries or concerns.        Allergies:  Allergies   Allergen Reactions     Clindamycin Rash       Problem List:    Patient Active Problem List    Diagnosis Date Noted     S/P  2018     Priority: Medium     PAC (premature atrial contraction) 2013     Priority: Medium     Holter       PVC (premature ventricular contraction) 2013     Priority: Medium     Holter       CARDIOVASCULAR SCREENING; LDL GOAL LESS THAN 160 2011     Priority: Medium        Past Medical History:    Past Medical History:   Diagnosis Date     Closed fracture of unspecified part of humerus      Lyme disease      PAC (premature atrial contraction) 2013     PVC (premature ventricular contraction) 2013       Past Surgical History:    Past Surgical History:   Procedure Laterality Date     ------------OTHER-------------      Lipoma removal      SECTION N/A 2018    Procedure:  SECTION;  Primary  Section ;  Surgeon: Ana Rosa Arellano MD;  Location: [facility] L[facility]     LAMINECT/DISCECTOMY, LUMBAR  2002     TONSILLECTOMY         Family History:    Family History   Problem Relation Age of Onset     Cancer Mother         breast     No Known Problems Father      Cerebrovascular Disease  Maternal Grandmother      Diabetes Maternal Grandmother         adult-onset     Heart Disease Maternal Grandfather      Cancer Paternal Grandmother      Heart Disease Paternal Grandfather      No Known Problems Brother        Social History:  Marital Status:   [2]  Social History     Tobacco Use     Smoking status: Never Smoker     Smokeless tobacco: Never Used   Substance Use Topics     Alcohol use: No     Drug use: No        Medications:    acetaminophen (TYLENOL) 325 MG tablet  ferrous gluconate (FERGON) 324 (38 Fe) MG tablet  LORazepam (ATIVAN) 0.5 MG tablet          Review of Systems   All other systems reviewed and are negative.      Physical Exam   BP: 124/81  Pulse: 91  Temp: 98.5  F (36.9  C)  Resp: 18  Weight: 90.7 kg (200 lb)  SpO2: 96 %      Physical Exam  Vitals and nursing note reviewed.   Constitutional:       General: She is not in acute distress.     Appearance: Normal appearance. She is not ill-appearing, toxic-appearing or diaphoretic.   HENT:      Head: Normocephalic and atraumatic.      Nose: Nose normal.   Eyes:      Extraocular Movements: Extraocular movements intact.      Conjunctiva/sclera: Conjunctivae normal.   Cardiovascular:      Pulses: Normal pulses.   Pulmonary:      Effort: Pulmonary effort is normal. No respiratory distress.   Musculoskeletal:      Cervical back: Neck supple.      Comments: Left foot: Pain elicited with palpation to posterior calcaneus at Achilles insertion site. Achilles tendon feels intact and tendon itself has no tenderness. Pain with dorsi flexion.  No tenderness to calf musculature, bony prominences of metatarsals or toes.  Normal DP pulse.  Normal sensation.   Skin:     General: Skin is warm and dry.   Neurological:      Mental Status: She is alert and oriented to person, place, and time. Mental status is at baseline.   Psychiatric:         Mood and Affect: Mood normal.         Behavior: Behavior normal.         ED Course           Procedures      Results for orders placed or performed during the hospital encounter of 04/23/22 (from the past 24 hour(s))   Foot XR, G/E 3 views, left    Narrative    EXAM: XR FOOT LEFT G/E 3 VIEWS  LOCATION: [facility]  DATE/TIME: 4/23/2022 1:07 PM    INDICATION: pain to heel  COMPARISON: None.      Impression    IMPRESSION: Normal joint spaces and alignment. No acute fracture. Spurring at the level of heterotopic bone projecting over the distal Achilles tendon.       Medications - No data to display       Assessments & Plan (with Medical Decision Making)  Enid Cisneros is a 42 year old female who presented to the ED complaining of left heel pain after performing a workout involving jumping/shuffling/running 2 days ago.  Reports inability to bear weight secondary to significant pain.  Denies direct trauma to the foot or numbness in the foot.  She was afebrile on arrival with normal vital signs.  Exam demonstrated an intact Achilles tendon.  No tenderness to tendon itself but exquisitely tender at insertion site over calcaneus.  No tenderness to bony prominences of metatarsals or toes.  Neurovascularly intact.  X-ray of the foot was obtained and showed no acute fractures but she did have some spurring at the level of heterotopic bone projecting over the distal Achilles tendon.  This certainly could be contributing to her symptoms.  In general however, based on exam, I question if she may have a subcutaneous bursitis or enthesopathy.  I discussed this potential diagnosis with the patient and typical treatment measures.  Patient was agreeable to conservative therapies to include scheduled ibuprofen, icing the heel, weightbearing as tolerated.  Crutches given to be weightbearing as tolerated.  Discussed option of orthotics with heel lifts as well she can purchase.  A referral to podiatry was given for reassessment and further management.  Return precautions provided.  All questions  answered and patient discharged home in suitable condition.     I have reviewed the nursing notes.    I have reviewed the findings, diagnosis, plan and need for follow up with the patient.    Discharge Medication List as of 4/23/2022  2:26 PM          Final diagnoses:   Bursitis of heel, left     Note: Chart documentation done in part with Dragon Voice Recognition software. Although reviewed after completion, some word and grammatical errors may remain.    4/23/2022   [facility] EMERGENCY DEPT     Pattie Muller PA-C  04/23/22 1525

## 2022-04-23 NOTE — ED TRIAGE NOTES
Patient is having increased left foot pain after a workout 2 days ago. She states she is unable to bear any weight today.

## 2022-05-03 ENCOUNTER — OFFICE VISIT (OUTPATIENT)
Dept: PODIATRY | Facility: CLINIC | Age: 42
End: 2022-05-03
Payer: COMMERCIAL

## 2022-05-03 VITALS
WEIGHT: 208 LBS | BODY MASS INDEX: 32.65 KG/M2 | HEIGHT: 67 IN | SYSTOLIC BLOOD PRESSURE: 110 MMHG | DIASTOLIC BLOOD PRESSURE: 70 MMHG

## 2022-05-03 DIAGNOSIS — M76.62 ACHILLES TENDINITIS OF LEFT LOWER EXTREMITY: Primary | ICD-10-CM

## 2022-05-03 DIAGNOSIS — M77.32 CALCANEAL SPUR OF LEFT FOOT: ICD-10-CM

## 2022-05-03 DIAGNOSIS — M72.2 PLANTAR FASCIITIS OF LEFT FOOT: ICD-10-CM

## 2022-05-03 PROCEDURE — 99203 OFFICE O/P NEW LOW 30 MIN: CPT | Performed by: PODIATRIST

## 2022-05-03 RX ORDER — DICLOFENAC SODIUM 75 MG/1
75 TABLET, DELAYED RELEASE ORAL 2 TIMES DAILY
Qty: 60 TABLET | Refills: 1 | Status: SHIPPED | OUTPATIENT
Start: 2022-05-03 | End: 2024-03-19

## 2022-05-03 ASSESSMENT — PAIN SCALES - GENERAL: PAINLEVEL: MILD PAIN (2)

## 2022-05-03 NOTE — LETTER
5/3/2022         RE: Enid Cisneros  [address] [facility] [address]        Dear Colleague,    Thank you for referring your patient, Enid Cisneros, to the [facility]. Please see a copy of my visit note below.    HPI:  Enid Cisneros is a 42 year old female who is seen in consultation at the request of ED DEPT - Pattie Muller PA-C    Pt presents for eval of:   (Onset, Location, L/R, Character, Treatments, Injury if yes)    XR left foot 2022     Ongoing plantar left heel pain that became more constant after a workout on 2022 that radiates to posterior left heel.  Cardio jumping workout that quite 4 years ago and then started again late april.  Tightness with first steps after lying or sitting pain 2-8/10. Burning pain with increase in activity w/o shoes.    Stretching, ice, different shoes to lesson pressure on heel.  In past heel lift, advil and rest resolved it.     Works as an .     ROS:  10 point ROS neg other than the symptoms noted above in the HPI.    Patient Active Problem List   Diagnosis     CARDIOVASCULAR SCREENING; LDL GOAL LESS THAN 160     PAC (premature atrial contraction)     PVC (premature ventricular contraction)     S/P        PAST MEDICAL HISTORY:   Past Medical History:   Diagnosis Date     Closed fracture of unspecified part of humerus     fx of radius     Lyme disease     history of     PAC (premature atrial contraction) 2013    Holter     PVC (premature ventricular contraction) 2013    Holter        PAST SURGICAL HISTORY:   Past Surgical History:   Procedure Laterality Date     ------------OTHER-------------      Lipoma removal      SECTION N/A 2018    Procedure:  SECTION;  Primary  Section ;  Surgeon: Ana Rosa Arellano MD;  [facility]: [facility]     LAMINECT/DISCECTOMY, LUMBAR  2002     TONSILLECTOMY          MEDICATIONS:   Current Outpatient Medications:       diclofenac (VOLTAREN) 75 MG EC tablet, Take 1 tablet (75 mg) by mouth 2 times daily, Disp: 60 tablet, Rfl: 1     acetaminophen (TYLENOL) 325 MG tablet, Take 2 tablets (650 mg) by mouth every 4 hours as needed for pain (Patient not taking: Reported on 9/12/2018), Disp: 100 tablet, Rfl: 0     ferrous gluconate (FERGON) 324 (38 Fe) MG tablet, Take 1 tablet (324 mg) by mouth daily (with breakfast) (Patient not taking: Reported on 6/27/2019), Disp: 100 tablet, Rfl: 0     LORazepam (ATIVAN) 0.5 MG tablet, Take 2 tablets (1 mg) by mouth every 6 hours as needed for anxiety, Disp: 15 tablet, Rfl: 0     ALLERGIES:    Allergies   Allergen Reactions     Clindamycin Rash        SOCIAL HISTORY:   Social History     Socioeconomic History     Marital status:      Spouse name: Didier     Number of children: 1     Years of education: Not on file     Highest education level: Not on file   Occupational History     Employer: UNEMPLOYED   Tobacco Use     Smoking status: Never Smoker     Smokeless tobacco: Never Used   Substance and Sexual Activity     Alcohol use: No     Drug use: No     Sexual activity: Yes     Partners: Male   Other Topics Concern      Service No     Blood Transfusions No     Caffeine Concern No     Occupational Exposure No     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  No smokers in the home.  Outdoor cats.  No concerns about domestic violence.     Social Determinants of Health     Financial Resource Strain: Not on file   Food Insecurity: Not on file   Transportation Needs: Not on file   Physical Activity: Not on file   Stress:  "Not on file   Social Connections: Not on file   Intimate Partner Violence: Not on file   Housing Stability: Not on file        FAMILY HISTORY:   Family History   Problem Relation Age of Onset     Cancer Mother         breast     No Known Problems Father      Cerebrovascular Disease Maternal Grandmother      Diabetes Maternal Grandmother         adult-onset     Heart Disease Maternal Grandfather      Cancer Paternal Grandmother      Heart Disease Paternal Grandfather      No Known Problems Brother         EXAM:Vitals: /70 (BP Location: Left arm, Patient Position: Sitting, Cuff Size: Adult Large)   Ht 1.71 m (5' 7.32\")   Wt 94.3 kg (208 lb)   BMI 32.27 kg/m    BMI= Body mass index is 32.27 kg/m .    General appearance: Patient is alert and fully cooperative with history & exam.  No sign of distress is noted during the visit.     Psychiatric: Affect is pleasant & appropriate.  Patient appears motivated to improve health.     Respiratory: Breathing is regular & unlabored while sitting.     HEENT: Hearing is intact to spoken word.  Speech is clear.  No gross evidence of visual impairment that would impact ambulation.     Vascular: DP & PT pulses are intact & regular bilaterally.  No significant edema or varicosities noted.  CFT and skin temperature is normal to both lower extremities.     Neurologic: Lower extremity sensation is intact to light touch.  No evidence of weakness or contracture in the lower extremities.  No evidence of neuropathy.    Dermatologic: Skin is intact to both lower extremities with adequate texture, turgor and tone about the integument.  No paronychia or evidence of soft tissue infection is noted.     Musculoskeletal: Patient is ambulatory without assistive device or brace.  Discomfort is noted with firm palpation about posterior left calcaneus at the insertion of the Achilles tendon.  Also some discomfort with firm palpation of the plantar fascia.  No palpable bursa " noted.    Radiographs: 3 views left foot demonstrate osteophyte at the insertion of the Achilles tendon as well as at the plantar calcaneus.  No acute cortical reaction.     ASSESSMENT:       ICD-10-CM    1. Achilles tendinitis of left lower extremity  M76.62 diclofenac (VOLTAREN) 75 MG EC tablet     Physical Therapy Referral   2. Plantar fasciitis of left foot  M72.2    3. Calcaneal spur of left foot  M77.32         PLAN:  Reviewed patient's chart in [facility].      5/3/2022   Dispensed a night splint to be utilized all night every night  Physical therapy to begin stretching and progress to home routine and time  Discussed appropriate shoe gear and appropriate training techniques  Begin oral anti-inflammatory Voltaren  Obtained and interpreted radiographs  Discussed alternative treatment options  Recommend follow-up 1 month.      Gian Mittal DPM        Again, thank you for allowing me to participate in the care of your patient.        Sincerely,        Gian Mittal DPM

## 2022-05-03 NOTE — PROGRESS NOTES
HPI:  Enid Cisneros is a 42 year old female who is seen in consultation at the request of ED [facility] - Pattie Muller PA-C    Pt presents for eval of:   (Onset, Location, L/R, Character, Treatments, Injury if yes)    XR left foot 2022     Ongoing plantar left heel pain that became more constant after a workout on 2022 that radiates to posterior left heel.  Cardio jumping workout that quite 4 years ago and then started again late april.  Tightness with first steps after lying or sitting pain 2-8/10. Burning pain with increase in activity w/o shoes.    Stretching, ice, different shoes to lesson pressure on heel.  In past heel lift, advil and rest resolved it.     Works as an .     ROS:  10 point ROS neg other than the symptoms noted above in the HPI.    Patient Active Problem List   Diagnosis     CARDIOVASCULAR SCREENING; LDL GOAL LESS THAN 160     PAC (premature atrial contraction)     PVC (premature ventricular contraction)     S/P        PAST MEDICAL HISTORY:   Past Medical History:   Diagnosis Date     Closed fracture of unspecified part of humerus     fx of radius     Lyme disease     history of     PAC (premature atrial contraction) 2013    Holter     PVC (premature ventricular contraction) 2013    Holter        PAST SURGICAL HISTORY:   Past Surgical History:   Procedure Laterality Date     ------------OTHER-------------      Lipoma removal      SECTION N/A 2018    Procedure:  SECTION;  Primary  Section ;  Surgeon: Ana Rosa Arellano MD;  [facility]     LAMINECT/DISCECTOMY, LUMBAR  2002     TONSILLECTOMY          MEDICATIONS:   Current Outpatient Medications:      diclofenac (VOLTAREN) 75 MG EC tablet, Take 1 tablet (75 mg) by mouth 2 times daily, Disp: 60 tablet, Rfl: 1     acetaminophen (TYLENOL) 325 MG tablet, Take 2 tablets (650 mg) by mouth every 4 hours as needed for pain (Patient not taking: Reported on  9/12/2018), Disp: 100 tablet, Rfl: 0     ferrous gluconate (FERGON) 324 (38 Fe) MG tablet, Take 1 tablet (324 mg) by mouth daily (with breakfast) (Patient not taking: Reported on 6/27/2019), Disp: 100 tablet, Rfl: 0     LORazepam (ATIVAN) 0.5 MG tablet, Take 2 tablets (1 mg) by mouth every 6 hours as needed for anxiety, Disp: 15 tablet, Rfl: 0     ALLERGIES:    Allergies   Allergen Reactions     Clindamycin Rash        SOCIAL HISTORY:   Social History     Socioeconomic History     Marital status:      Spouse name: Didier     Number of children: 1     Years of education: Not on file     Highest education level: Not on file   Occupational History     Employer: UNEMPLOYED   Tobacco Use     Smoking status: Never Smoker     Smokeless tobacco: Never Used   Substance and Sexual Activity     Alcohol use: No     Drug use: No     Sexual activity: Yes     Partners: Male   Other Topics Concern      Service No     Blood Transfusions No     Caffeine Concern No     Occupational Exposure No     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  No smokers in the home.  Outdoor cats.  No concerns about domestic violence.     Social Determinants of Health     Financial Resource Strain: Not on file   Food Insecurity: Not on file   Transportation Needs: Not on file   Physical Activity: Not on file   Stress: Not on file   Social Connections: Not on file   Intimate Partner Violence: Not on file   Housing Stability: Not on file        FAMILY HISTORY:   Family History   Problem Relation Age of Onset     Cancer Mother         breast     No Known Problems  "Father      Cerebrovascular Disease Maternal Grandmother      Diabetes Maternal Grandmother         adult-onset     Heart Disease Maternal Grandfather      Cancer Paternal Grandmother      Heart Disease Paternal Grandfather      No Known Problems Brother         EXAM:Vitals: /70 (BP Location: Left arm, Patient Position: Sitting, Cuff Size: Adult Large)   Ht 1.71 m (5' 7.32\")   Wt 94.3 kg (208 lb)   BMI 32.27 kg/m    BMI= Body mass index is 32.27 kg/m .    General appearance: Patient is alert and fully cooperative with history & exam.  No sign of distress is noted during the visit.     Psychiatric: Affect is pleasant & appropriate.  Patient appears motivated to improve health.     Respiratory: Breathing is regular & unlabored while sitting.     HEENT: Hearing is intact to spoken word.  Speech is clear.  No gross evidence of visual impairment that would impact ambulation.     Vascular: DP & PT pulses are intact & regular bilaterally.  No significant edema or varicosities noted.  CFT and skin temperature is normal to both lower extremities.     Neurologic: Lower extremity sensation is intact to light touch.  No evidence of weakness or contracture in the lower extremities.  No evidence of neuropathy.    Dermatologic: Skin is intact to both lower extremities with adequate texture, turgor and tone about the integument.  No paronychia or evidence of soft tissue infection is noted.     Musculoskeletal: Patient is ambulatory without assistive device or brace.  Discomfort is noted with firm palpation about posterior left calcaneus at the insertion of the Achilles tendon.  Also some discomfort with firm palpation of the plantar fascia.  No palpable bursa noted.    Radiographs: 3 views left foot demonstrate osteophyte at the insertion of the Achilles tendon as well as at the plantar calcaneus.  No acute cortical reaction.     ASSESSMENT:       ICD-10-CM    1. Achilles tendinitis of left lower extremity  M76.62 " diclofenac (VOLTAREN) 75 MG EC tablet     Physical Therapy Referral   2. Plantar fasciitis of left foot  M72.2    3. Calcaneal spur of left foot  M77.32         PLAN:  Reviewed patient's chart in [facility].      5/3/2022   Dispensed a night splint to be utilized all night every night  Physical therapy to begin stretching and progress to home routine and time  Discussed appropriate shoe gear and appropriate training techniques  Begin oral anti-inflammatory Voltaren  Obtained and interpreted radiographs  Discussed alternative treatment options  Recommend follow-up 1 month.      Gian Mittal DPM

## 2022-05-03 NOTE — PATIENT INSTRUCTIONS
Insertional achilles tendonitis Reliable shoe stores: To maximize your experience and provide the best possible fit.  Be sure to show them your foot concerns and tell them Dr. Mittal sent you.      Stores listed in bold have only athletic shoes, and stores that are not bold are mostly casual or variety of shoes    [employer]  [address] [place] [address]  [phone]     [employer] [place]  [address] [place] [address]  [phone]     [employer] [facility]  [address] [place] [address]  [phone]    [employer]  [address] [facility]  [facility] [address]  [phone]    [employer]  [address] [place] [address]  [phone]    [employer]  [address] [place] [address]  [phone]                         [employer] Locations:     [address] [place] [address]   [phone]     [address] [place] [address] [address] [place] [address]   [phone]     [address] [place] [address]   [phone]     [address] [place] [address]   [phone]     [address] [place] [address] [place] [address]   [phone]     [address] [place] [address]   [phone]     [address]  [facility] [address]   [place] [address]   [phone]     [address] [facility]   [place] [address]   [phone]             How to find reasonable shoes          The correct width    Correct Fitting    Correct Length      Foot Distortion    Posture Distortion                          Torsional Rigidity      Grasp behind the heel and underneath the foot and twist      Bad    Excessive torsion/twist in midfoot     Less torsion/twist in midfoot is better                   Heel Counter Rigidity      Grasp just above   midsole and squeeze      Bad    Soft heel counter      Good    Rigid Heel Counter      Flexion Rigidity      Grasp shoe and bend from forefoot to rearfoot

## 2022-05-03 NOTE — NURSING NOTE
Dispensed 1 Dorsal (Anterior) Night Splint, Size S/M, with [facility] agreement signed by patient. Clarisa Gordon CMA, May 3, 2022

## 2022-05-17 ENCOUNTER — HOSPITAL ENCOUNTER (OUTPATIENT)
Dept: PHYSICAL THERAPY | Facility: CLINIC | Age: 42
Setting detail: THERAPIES SERIES
Discharge: HOME | End: 2022-05-17
Attending: PODIATRIST
Payer: COMMERCIAL

## 2022-05-17 DIAGNOSIS — M76.62 ACHILLES TENDINITIS OF LEFT LOWER EXTREMITY: ICD-10-CM

## 2022-05-17 PROCEDURE — 97161 PT EVAL LOW COMPLEX 20 MIN: CPT | Mod: GP | Performed by: PHYSICAL THERAPIST

## 2022-05-17 PROCEDURE — 97110 THERAPEUTIC EXERCISES: CPT | Mod: GP | Performed by: PHYSICAL THERAPIST

## 2022-05-17 NOTE — PROGRESS NOTES
22 0922   General Information   Type of Visit Initial OP Ortho PT Evaluation   Start of Care Date 22   Referring Physician Gian Mittal DPM   Orders Evaluate and Treat   Orders Comment Achilles tendonitis, insertional, chronic   Date of Order 22   Certification Required? No   Medical Diagnosis Achilles tendinitis of left lower extremity (M76.62)   Surgical/Medical history reviewed Yes   Precautions/Limitations no known precautions/limitations   Weight-Bearing Status - LUE full weight-bearing   Weight-Bearing Status - RUE full weight-bearing   Weight-Bearing Status - LLE full weight-bearing   Weight-Bearing Status - RLE full weight-bearing   General Information Comments PMH: Closed fracture of unspecified part of humerus (fx of radius) , Lyme disease, Premature atrial contraction (Holter) , Premature ventricular contraction (Holter) .  PSH: Lipoma removal ,  section , Laminect/discectomy lumbar , Tonsillectomy        Present No   Body Part(s)   Body Part(s) Ankle/Foot   Presentation and Etiology   Pertinent history of current problem (include personal factors and/or comorbidities that impact the POC) Patient presents for evaluation of L Achilles tendinitis.  She states pain has been ongoing for years, but has never really been a big deal.  She recently started exercising again - tried to do a 21 day program.  When she finished day 1 she noticed her heel was starting to hurt and the next day she could barely walk.  The day after that she couldn t put any weight on it and it felt like fire going up her feel.  She went to the ED on  and was given crutches, x-rays showed no fracture.  She only needed crutches for 2 days and was then able to limp around after that and by the end of the week she was back to her  normal limping .  She feels like she s adjusted her walking over the years in order to avoid pain - she feels like she walks  more on the outside of her foot and the ball of the foot.  It s less painful.  She also tries to walk so the ankle doesn t bend as much.  She followed up with Dr. Mittal on 5/3 and was given a night splint to wear.  She said it felt good at first, but she woke up in the middle of the night feeling as if it were on fire, so she removed the splint.  She felt better in the morning, but she was afraid to use it after that.  Dr. Mittal had also recommended different shoes - something with a sturdier bottom that wouldn t bend as much.  She hasn t gotten new shoes yet.  Pain mostly occurs when she s wearing the wrong shoes (or no shoes) and walking on hardwood floors, such as when she s doing housework.   Impairments A. Pain;B. Decreased WB tolerance;D. Decreased ROM;E. Decreased flexibility;F. Decreased strength and endurance;H. Impaired gait;J. Burning   Functional Limitations perform activities of daily living;perform desired leisure / sports activities   Symptom Location L heel going up the back of the leg just through Achilles tendon   How/Where did it occur During recreation/sports   Onset date of current episode/exacerbation 04/21/22   Chronicity Chronic  (New exacerbation of a chronic issue)   Pain rating (0-10 point scale) Best (/10);Worst (/10)  (Currently 0/10)   Best (/10) 0/10   Worst (/10) 10/10   Pain quality H. Other   Pain quality comment Burning, fire going up the heel/back of the leg   Frequency of pain/symptoms C. With activity   Pain/symptoms are: Worse in the morning   Pain/symptoms exacerbated by M. Other   Pain exacerbation comment Wrong footwear and floor combination, too much activity without appropriate footwear or when going barefoot   Pain/symptoms eased by K. Other   Pain eased by comment Wearing the right shoes, stretching   Progression of symptoms since onset:   (Goes up and down)   Current / Previous Interventions   Diagnostic Tests: X-ray   X-ray Results Results   X-ray results 3 views left  foot demonstrate osteophyte at the insertion of the Achilles tendon as well as at the plantar calcaneus.  No acute cortical reaction.   Prior Level of Function   Prior Level of Function-Mobility Independent   Prior Level of Function-ADLs Independent   Current Level of Function   Patient role/employment history A. Employed   Employment Comments  - sits at a desk   Living environment [facility]   Home/community accessibility Stairs present, no issues   Current equipment-Gait/Locomotion None   Current equipment-ADL None   Fall Risk Screen   Fall screen completed by PT   Have you fallen 2 or more times in the past year? No   Have you fallen and had an injury in the past year? No   Is patient a fall risk? No   Abuse Screen (yes response referral indicated)   Feels Unsafe at Home or Work/School no   Feels Threatened by Someone no   Does Anyone Try to Keep You From Having Contact with Others or Doing Things Outside Your Home? no   Physical Signs of Abuse Present no   Functional Scales   Functional Scales   (LEFS 59/80 (73.75%))   Ankle/Foot Objective Findings   Accessory Motion/Joint Mobility No deficits noted   Side (if bilateral, select both right and left) Left   Palpation At first, no tenderness or pain, but with extended palpation and eventual palpation of bone spur, patient reported increased pain and reproduction of symptoms, located just lateral to L heel and at Achilles insertion.   Gait/Locomotion Non-antalgic   Balance/ Proprioception (Single Leg Stance) Struggles with L SLS as compared to R SLS, but able to perform both   Foot Position In Standing No excessive pronation or supination, normal arch height, slight lateral deviation of toes bilaterally, no excessive forefoot or rearfoot varus/valgus noted   Ankle/Foot Strength Comments Able to perform SL toe raises, heel raises bilaterally   Left PF/Inversion Strength 4+/5   Left PF/Eversion Strength 4+/5   Left PF Strength 4+/5   Left DF (Knee Ext)  AROM 7 deg - measured in long sitting   Left PF AROM 71 deg - measured in long sitting   Left DF/Inversion Strength 4+/5   Left DF/Eversion Strength 4+/5   Right DF (Knee Ext) AROM 8 deg - measured in long sitting   Right PF AROM 70 deg - measured in long sitting   Planned Therapy Interventions   Planned Therapy Interventions balance training;gait training;joint mobilization;manual therapy;ROM;strengthening;neuromuscular re-education;stretching   Planned Modality Interventions   Planned Modality Interventions Cryotherapy;Hot packs;Ultrasound   Planned Modality Interventions Comments Modalities as needed for symptom relief   Clinical Impression   Criteria for Skilled Therapeutic Interventions Met yes, treatment indicated   PT Diagnosis L heel pain, increased muscle tightness, decreased L LE strength   Influenced by the following impairments L heel pain, increased muscle tightness, decreased L LE strength   Functional limitations due to impairments Ambulation, higher level exercises   Clinical Presentation Stable/Uncomplicated   Clinical Presentation Rationale Based on observation, history, evaluation and clinical judgment.   Clinical Decision Making (Complexity) Low complexity   Therapy Frequency 1 time/week   Predicted Duration of Therapy Intervention (days/wks) 6 weeks   Risk & Benefits of therapy have been explained Yes   Patient, Family & other staff in agreement with plan of care Yes   Clinical Impression Comments Patient presents with signs and symptoms consistent with referring diagnosis of Achilles tendinitis.  She demonstrates L heel pain, increased muscle tightness, decreased L LE strength.  Functionally, she has increased pain and difficulty with ambulation and high level exercises.  Patient will benefit from skilled physical therapy interventions to address physical impairments for return to functional activities.   Education Assessment   Preferred Learning Style  Listening;Reading;Pictures/video;Demonstration   Barriers to Learning No barriers   ORTHO GOALS   PT Ortho Eval Goals 1;2;3   Ortho Goal 1   Goal Identifier 1   Goal Description Patient will increase LEFS score by at least 9 points in order to demonstrate clinically significant improvement in function.   Target Date 06/27/22   Ortho Goal 2   Goal Identifier 2   Goal Description Patient will demonstrate ability to perform 10 SL heel raises with L LE without increased pain in order to demonstrate improvement in strength and control of L LE   Target Date 06/27/22   Ortho Goal 3   Goal Identifier 3   Goal Description Patient will be able to participate fully in exercises/work outs with L heel pain at < 2/10 in order to return to prior level of function.   Target Date 06/27/22   Total Evaluation Time   PT Eval, Low Complexity Minutes (26637) 35           Mally Fish PT, DPT, CLT-[facility]  Physical Therapist     Phone: [phone]  Email: [email or web address]

## 2022-06-01 ENCOUNTER — TRANSFERRED RECORDS (OUTPATIENT)
Dept: MULTI SPECIALTY CLINIC | Facility: CLINIC | Age: 42
End: 2022-06-01

## 2022-06-01 LAB — PAP SMEAR - HIM PATIENT REPORTED: NORMAL

## 2022-10-09 ENCOUNTER — HEALTH MAINTENANCE LETTER (OUTPATIENT)
Age: 42
End: 2022-10-09

## 2023-01-13 ENCOUNTER — TELEPHONE (OUTPATIENT)
Dept: BEHAVIORAL HEALTH | Facility: CLINIC | Age: 43
End: 2023-01-13

## 2023-01-20 ENCOUNTER — TELEPHONE (OUTPATIENT)
Dept: BEHAVIORAL HEALTH | Facility: CLINIC | Age: 43
End: 2023-01-20
Payer: COMMERCIAL

## 2023-01-24 ENCOUNTER — TELEPHONE (OUTPATIENT)
Dept: BEHAVIORAL HEALTH | Facility: CLINIC | Age: 43
End: 2023-01-24
Payer: COMMERCIAL

## 2023-01-24 NOTE — TELEPHONE ENCOUNTER
----- Message from Anitha Salmon sent at 1/24/2023  1:10 PM CST -----  Regarding: BENEFITS FOR APPT TOMORROW  Good Afternoon-    Please request for benefits for tomorrows appointment ASAP.      Thank you!    Anitha DE LA CRUZ  Assessment Center Coordinator

## 2023-01-25 ENCOUNTER — HOSPITAL ENCOUNTER (OUTPATIENT)
Dept: BEHAVIORAL HEALTH | Facility: CLINIC | Age: 43
Discharge: HOME | End: 2023-01-25
Attending: FAMILY MEDICINE | Admitting: FAMILY MEDICINE
Payer: COMMERCIAL

## 2023-01-25 PROCEDURE — 90791 PSYCH DIAGNOSTIC EVALUATION: CPT | Mod: GT | Performed by: COUNSELOR

## 2023-01-25 ASSESSMENT — PATIENT HEALTH QUESTIONNAIRE - PHQ9
5. POOR APPETITE OR OVEREATING: MORE THAN HALF THE DAYS
SUM OF ALL RESPONSES TO PHQ QUESTIONS 1-9: 8

## 2023-01-25 ASSESSMENT — COLUMBIA-SUICIDE SEVERITY RATING SCALE - C-SSRS
1. HAVE YOU WISHED YOU WERE DEAD OR WISHED YOU COULD GO TO SLEEP AND NOT WAKE UP?: NO
6. HAVE YOU EVER DONE ANYTHING, STARTED TO DO ANYTHING, OR PREPARED TO DO ANYTHING TO END YOUR LIFE?: NO
ATTEMPT LIFETIME: NO
TOTAL  NUMBER OF ABORTED OR SELF INTERRUPTED ATTEMPTS LIFETIME: NO
TOTAL  NUMBER OF INTERRUPTED ATTEMPTS LIFETIME: NO
2. HAVE YOU ACTUALLY HAD ANY THOUGHTS OF KILLING YOURSELF?: NO

## 2023-01-25 ASSESSMENT — ANXIETY QUESTIONNAIRES
2. NOT BEING ABLE TO STOP OR CONTROL WORRYING: MORE THAN HALF THE DAYS
3. WORRYING TOO MUCH ABOUT DIFFERENT THINGS: SEVERAL DAYS
6. BECOMING EASILY ANNOYED OR IRRITABLE: MORE THAN HALF THE DAYS
GAD7 TOTAL SCORE: 9
GAD7 TOTAL SCORE: 9
5. BEING SO RESTLESS THAT IT IS HARD TO SIT STILL: NOT AT ALL
1. FEELING NERVOUS, ANXIOUS, OR ON EDGE: MORE THAN HALF THE DAYS
IF YOU CHECKED OFF ANY PROBLEMS ON THIS QUESTIONNAIRE, HOW DIFFICULT HAVE THESE PROBLEMS MADE IT FOR YOU TO DO YOUR WORK, TAKE CARE OF THINGS AT HOME, OR GET ALONG WITH OTHER PEOPLE: EXTREMELY DIFFICULT
7. FEELING AFRAID AS IF SOMETHING AWFUL MIGHT HAPPEN: NOT AT ALL

## 2023-01-25 NOTE — PROGRESS NOTES
"[facility]      PATIENT'S NAME: Enid Cisneros  PREFERRED NAME: Enid  PRONOUNS: she/her/hers     MRN: [id number]  : 1980  ADDRESS: [address] [place] [id number]  ACCT. NUMBER:  [id number]  DATE OF SERVICE: 23  START TIME: 1300  END TIME: 1400  PREFERRED PHONE: [phone]  May we leave a program related message: Yes  SERVICE MODALITY:  Video Visit:      Provider verified identity through the following two step process.  Patient provided:  Patient  and Patient address    Telemedicine Visit: The patient's condition can be safely assessed and treated via synchronous audio and visual telemedicine encounter.      Reason for Telemedicine Visit: Services only offered telehealth    Originating Site (Patient Location): Patient's home    Distant Site (Provider Location): Provider Remote Setting- Home Office    Consent:  The patient/guardian has verbally consented to: the potential risks and benefits of telemedicine (video visit) versus in person care; bill my insurance or make self-payment for services provided; and responsibility for payment of non-covered services.     Patient would like the video invitation sent by:  My Chart    Mode of Communication:  Video Conference via [employer]well    Distant Location (Provider):  Off-site    As the provider I attest to compliance with applicable laws and regulations related to telemedicine.    UNIVERSAL ADULT Mental Health DIAGNOSTIC ASSESSMENT    Identifying Information:  Patient is a 42 year old,   individual.  Patient was referred for an assessment byself.  Patient attended the session alone.    Chief Complaint:   The reason for seeking services at this time is: \"Depression\".  Patient reports having lower mood changes in winter months when it is cold and lack of light.  This has been happening more consistently in the past three years.  The problem(s) began 23.    Patient has not " "attempted to resolve these concerns in the past.    Social/Family History:  Patient reported they grew up in [place].  They were raised by biological parents  .  Parents were always together.  Patient reported that their childhood was \"it was really good\".  Patient described their current relationships with family of origin as \"parents are gone all winter and doesn't see brother often\".     The patient describes their cultural background as .  Cultural influences and impact on patient's life structure, values, norms, and healthcare: Rural Confucianism.  Contextual influences on patient's health include: Contextual Factors: Individual Factors Birth related trauma.    These factors will be addressed in the Preliminary Treatment plan. Patient identified their preferred language to be English. Patient reported they does not need the assistance of an  or other support involved in therapy.     Patient reported had no significant delays in developmental tasks.   Patient's highest education level was college graduate  .  Patient identified the following learning problems: none reported.  Modifications will not be used to assist communication in therapy.  Patient reports they are  able to understand written materials.    Patient reported the following relationship history:  Patient is currently .  Patient's current relationship status is  for 20 years.   Patient identified their sexual orientation as heterosexual.  Patient reported having 3 child(jignesh). Patient identified friends as part of their support system.  Patient identified the quality of these relationships as fair,  .      Patient's current living/housing situation involves staying in own home/apartment.  The immediate members of family and household include Didier Cisneros, 45,Spouse and three children and they report that housing is stable.    Patient is currently employed fulltime.  Patient reports their finances are " obtained through employment; spouse. Patient does not identify finances as a current stressor.      Patient reported that they have not been involved with the legal system.  Patient does not report being under probation/ parole/ jurisdiction. They are not under any current court jurisdiction. .    Patient's Strengths and Limitations:  Patient identified the following strengths or resources that will help them succeed in treatment: commitment to health and well being. Things that may interfere with the patient's success in treatment include: none identified.     Assessments:  The following assessments were completed by patient for this visit:  PHQ9:   PHQ-9 SCORE 2/8/2007 10/26/2007 1/25/2023   PHQ-9 Total Score 5 0 -   PHQ-9 Total Score - - 8     GAD7:   HOLGER-7 SCORE 1/17/2020 1/25/2023   Total Score 4 9     CAGE-AID:   CAGE-AID Total Score 1/14/2023 1/14/2023 1/25/2023   Total Score 0 0 0   Total Score MyChart - 0 (A total score of 2 or greater is considered clinically significant) 0 (A total score of 2 or greater is considered clinically significant)     PROMIS 10-Global Health (only subscores and total score):   PROMIS-10 Scores Only 1/14/2023 1/14/2023   Global Mental Health Score 9 9   Global Physical Health Score 14 14   PROMIS TOTAL - SUBSCORES 23 23     [place] Suicide Severity Rating Scale (Lifetime/Recent)  [place] Suicide Severity Rating (Lifetime/Recent) 1/25/2023   1. Wish to be Dead (Lifetime) 0   2. Non-Specific Active Suicidal Thoughts (Lifetime) 0   Actual Attempt (Lifetime) 0   Has subject engaged in non-suicidal self-injurious behavior? (Lifetime) 0   Interrupted Attempts (Lifetime) 0   Aborted or Self-Interrupted Attempt (Lifetime) 0   Preparatory Acts or Behavior (Lifetime) 0   Calculated C-SSRS Risk Score (Lifetime/Recent) No Risk Indicated       Personal and Family Medical History:  Patient does not report a family history of mental health concerns.  Patient reports family history includes  Cancer in her mother and paternal grandmother; Cerebrovascular Disease in her maternal grandmother; Diabetes in her maternal grandmother; Heart Disease in her maternal grandfather and paternal grandfather; No Known Problems in her brother and father..     Patient does not report Mental Health Diagnosis or Treatment.      Patient has had a physical exam to rule out medical causes for current symptoms.  Date of last physical exam was within the past year. Client was encouraged to follow up with PCP if symptoms were to develop. The patient does not have a Primary Care Provider and was encouraged to establish care with a PCP..  Patient reports no current medical and/or dental concerns.  Patient denies any issues with pain..   There are not significant appetite / nutritional concerns / weight changes.   Patient does not report a history of head injury / trauma / cognitive impairment.      Patient reports not taking any current medications    Medication Adherence:  Patient reports not currently prescribed.      Patient Allergies:    Allergies   Allergen Reactions     Clindamycin Rash       Medical History:    Past Medical History:   Diagnosis Date     Closed fracture of unspecified part of humerus 1981    fx of radius     Lyme disease     history of     PAC (premature atrial contraction) 8/2013    Holter     PVC (premature ventricular contraction) 8/2013    Holter         Current Mental Status Exam:   Appearance:  Appropriate    Eye Contact:  Good   Psychomotor:  Normal       Gait / station:  no problem  Attitude / Demeanor: Cooperative  Interested  Speech      Rate / Production: Normal/ Responsive      Volume:  Normal  volume      Language:  intact  Mood:   Normal  Affect:   Appropriate    Thought Content: Clear   Thought Process: Logical       Associations: No loosening of associations  Insight:   Good   Judgment:  Intact   Orientation:  All  Attention/concentration: Good      Substance Use:  Patient did not report a  family history of substance use concerns; see medical history section for details.  Patient has not received chemical dependency treatment in the past.  Patient has not ever been to detox.      Patient is not currently receiving any chemical dependency treatment.       Substance History of use Age of first use Date of last use     Pattern and duration of use (include amounts and frequency)   Alcohol currently use   16 01/04/23 REPORTS SUBSTANCE USE: reports using substance 2 times per month and has 2 glasses of wine at a time.   Patient reports heaviest use is current use.   Cannabis   never used     REPORTS SUBSTANCE USE: N/A     Amphetamines   never used     REPORTS SUBSTANCE USE: N/A   Cocaine/crack    never used       REPORTS SUBSTANCE USE: N/A   Hallucinogens never used         REPORTS SUBSTANCE USE: N/A   Inhalants never used         REPORTS SUBSTANCE USE: N/A   Heroin never used         REPORTS SUBSTANCE USE: N/A   Other Opiates never used     REPORTS SUBSTANCE USE: N/A   Benzodiazepine   never used     REPORTS SUBSTANCE USE: N/A   Barbiturates never used     REPORTS SUBSTANCE USE: N/A   Over the counter meds used in the past 12 03/19/21 REPORTS SUBSTANCE USE: N/A   Caffeine used in the past 16  1/24/23 REPORTS SUBSTANCE USE: reports using substance 1 times per week and has 1 coffee at a time.   Patient reports heaviest use is current use.   Nicotine  never used     REPORTS SUBSTANCE USE: N/A   Other substances not listed above:  Identify:  never used     REPORTS SUBSTANCE USE: N/A     Patient reported the following problems as a result of their substance use: no problems, not applicable.    Substance Use: No symptoms    Based on the negative CAGE score and clinical interview there  are not indications of drug or alcohol abuse.      Significant Losses / Trauma / Abuse / Neglect Issues:   Patient did not  serve in the .  There are indications or report of significant loss, trauma, abuse or neglect  issues related to: Patient reports traumatic miscarriages and recent birth.  Concerns for possible neglect are not present.     Safety Assessment:   Patient denies current homicidal ideation and behaviors.  Patient denies current self-injurious ideation and behaviors.    Patient denied risk behaviors associated with substance use.  Patient denies any high risk behaviors associated with mental health symptoms.  Patient reports the following current concerns for their personal safety: None.  Patient reports there are not firearms in the house.       There are no firearms in the home..    History of Safety Concerns:  Patient denied a history of homicidal ideation.     Patient denied a history of personal safety concerns.    Patient denied a history of assaultive behaviors.    Patient denied a history of sexual assault behaviors.     Patient denied a history of risk behaviors associated with substance use.  Patient denies any history of high risk behaviors associated with mental health symptoms.  Patient reports the following protective factors: forward or future oriented thinking; dedication to family or friends; safe and stable environment; regular sleep; effectively controls impulses; regular physical activity; sense of belonging; purpose; living with other people; daily obligations; structured day; effective problem solving skills; commitment to well being; sense of meaning; positive social skills; financial stability; strong sense of self worth or esteem    Risk Plan:  See Recommendations for Safety and Risk Management Plan    Review of Symptoms per patient report:   Depression: Change in sleep, Lack of interest, Excessive or inappropriate guilt, Change in energy level, Difficulties concentrating, Feelings of hopelessness, Ruminations, Irritability and Feeling sad, down, or depressed  Barby:  No Symptoms  Psychosis: No Symptoms  Anxiety: Excessive worry, Nervousness, Physical complaints, such as headaches,  stomachaches, muscle tension, Sleep disturbance, Ruminations, Poor concentration and Irritability  Panic:  Sense of impending doom  Post Traumatic Stress Disorder:  Hypervigilance   Eating Disorder: No Symptoms  ADD / ADHD:  No symptoms  Conduct Disorder: No symptoms  Autism Spectrum Disorder: No symptoms  Obsessive Compulsive Disorder: No Symptoms    Patient reports the following compulsive behaviors and treatment history: Patient denies.      Diagnostic Criteria:   Unspecified Anxiety Disorder , Symptoms characteristic of an anxiety disorder that caused clinically significant distress or impairment in social, occupational, or other important areas of functioning predominate but do not meet the full criteria for any of the disorders of the anxiety disorders diagnostic class. Major Depressive Disorder  A) Recurrent episode(s) - symptoms have been present during the same 2-week period and represent a change from previous functioning 5 or more symptoms (required for diagnosis)   - Depressed mood. Note: In children and adolescents, can be irritable mood.     - Diminished interest or pleasure in all, or almost all, activities.    - Fatigue or loss of energy.    - Feelings of worthlessness or inappropriate guilt.    - Diminished ability to think or concentrate, or indecisiveness.   B) The symptoms cause clinically significant distress or impairment in social, occupational, or other important areas of functioning  C) The episode is not attributable to the physiological effects of a substance or to another medical condition  D) The occurence of major depressive episode is not better explained by other thought / psychotic disorders    Functional Status:  Patient reports the following functional impairments:  relationship(s) and self-care.     Nonprogrammatic care:  Patient is requesting basic services to address current mental health concerns.    Clinical Summary:  1. Reason for assessment: to determine level of care  .  2.  Psychosocial, Cultural and Contextual Factors: Birth related trauma  .  3. Principal DSM5 Diagnoses  (Sustained by DSM5 Criteria Listed Above):   311 (F32.9) Unspecified Depressive Disorder   4. Other Diagnoses that is relevant to services:   300.00 (F41.9) Unspecified Anxiety Disorder.  5. Provisional Diagnosis:Further diagnosis clarification may be beneficial.  6. Prognosis: Expect Improvement.  7. Likely consequences of symptoms if not treated: higher level of care.  8. Client strengths include:  open to learning, open to suggestions / feedback, responsible parent, supportive, wants to learn and willing to relate to others .     Recommendations:     1. Plan for Safety and Risk Management:   Safety and Risk: Recommended that patient call 911 or go to the local ED should there be a change in any of these risk factors..          Report to child / adult protection services was NA.     2. Patient's identified None identified.     3. Initial Treatment will focus on:    Depressed Mood - .  Anxiety - ..     4. Resources/Service Plan:    services are not indicated.   Modifications to assist communication are not indicated.   Additional disability accommodations are not indicated.      5. Collaboration:   Collaboration / coordination of treatment will be initiated with the following  support professionals: primary care physician.      6.  Referrals:   The following referral(s) will be initiated: Outpatient Mental Alex Therapy. Next Scheduled Appointment: Patient and  to collaborate and patient to schedule as needed.      A Release of Information has been obtained for the following: Emergency Contact.     Emergency Contact Didier Cisneros was obtained.      Clinical Substantiation/medical necessity for the above recommendations:     Patient presents today with concerns for Depression.      Patient reports historical mental health concerns for low mood during the winter months.  Patient denies concerns  outside of this time frame and reports an increase in difficulty in functioning during this time.  Patient discussed having lack of support to discuss mood and concerns.  Patient reports suffering three miscarriages and having a traumatic birth with her third child.    Patient denies previous mental health concerns and/or providers.    Patient denies current substance use concerns and reports ability to maintain safety when using substances.    Patient denies current thoughts of suicidal ideation and self harm and reports ability to keep self safe.    Patient is referred to: Individual therapy to process birth related trauma.  Information sent to patient regarding individual therapy resources.  Patient agreed to reach out to  if needed for additional resources.  Patient also encouraged to schedule with primary care and/or an EVISIT to discuss potential for medication if interested.    Contact information was provided.    7. MARYAM:    MARYAM:  Discussed the general effects of drugs and alcohol on health and well-being and Discussed the impact of drugs and alcohol when used during pregnancy. Provider gave patient printed information about the  effects of chemical use on their health and well being. Recommendations:  To abstain from all mood altering substances .     8. Records:   These were reviewed at time of assessment.   Information in this assessment was obtained from the medical record and provided by patient who is a good historian.    Patient will have open access to their mental health medical record.    9.   Interactive Complexity: No      Provider Name/ Credentials:  Sruthi Smiht [facility]  January 25, 2023

## 2023-02-18 ENCOUNTER — HEALTH MAINTENANCE LETTER (OUTPATIENT)
Age: 43
End: 2023-02-18

## 2023-05-07 NOTE — PROGRESS NOTES
[facility]    Outpatient Physical Therapy Discharge Note  Patient: Enid Cisneros  : 1980    Beginning/End Dates of Reporting Period:  22 to 22    Referring Provider: Gian Mittal DPM    Therapy Diagnosis: L heel pain, increased muscle tightness, decreased L LE strength     Client Self Report: See initial evaluation for full details    Objective Measurements:  Objective Measure: LEFS  Details: Lower Extremity Functional Scale (LEFS) assesses the patients level of difficulty with various activities. The higher the score, the greater the level of function a patient demonstrates. Pt scored 59 points out of 80 possible indicating patient is at 73.75% of maximal function. MCID is 9 points. LEFS is validated for patients 18 years and older, and if completed by a younger patient, is done to help determine functional deficits and activity limitations for goal use.         Goals:  Goal Identifier 1   Goal Description Patient will increase LEFS score by at least 9 points in order to demonstrate clinically significant improvement in function.   Target Date 22   Date Met      Progress (detail required for progress note):       Goal Identifier 2   Goal Description Patient will demonstrate ability to perform 10 SL heel raises with L LE without increased pain in order to demonstrate improvement in strength and control of L LE   Target Date 22   Date Met      Progress (detail required for progress note):       Goal Identifier 3   Goal Description Patient will be able to participate fully in exercises/work outs with L heel pain at < 2/10 in order to return to prior level of function.   Target Date 22   Date Met      Progress (detail required for progress note):         Plan:  Discharge from therapy.    Patient seen for initial evaluation, did not show up to next scheduled appt and  canceled her other scheduled appt.  Did not reschedule.        Discharge:    Reason for Discharge: Patient has failed to schedule further appointments.    Equipment Issued: N/A    Discharge Plan: Patient to continue home program.      Mally Fish PT, DPT, LEE-JUSTYNA  [facility]  Physical Therapist     Phone: [phone]  Email: [email or web address]

## 2023-08-19 ENCOUNTER — HEALTH MAINTENANCE LETTER (OUTPATIENT)
Age: 43
End: 2023-08-19

## 2024-01-10 ENCOUNTER — VIRTUAL VISIT (OUTPATIENT)
Dept: FAMILY MEDICINE | Facility: OTHER | Age: 44
End: 2024-01-10
Payer: COMMERCIAL

## 2024-01-10 DIAGNOSIS — R59.1 LYMPHADENOPATHY: ICD-10-CM

## 2024-01-10 DIAGNOSIS — H92.01 OTALGIA, RIGHT: Primary | ICD-10-CM

## 2024-01-10 PROCEDURE — 99203 OFFICE O/P NEW LOW 30 MIN: CPT | Mod: 95 | Performed by: PHYSICIAN ASSISTANT

## 2024-01-10 RX ORDER — AMOXICILLIN 875 MG
875 TABLET ORAL 2 TIMES DAILY
Qty: 14 TABLET | Refills: 0 | Status: SHIPPED | OUTPATIENT
Start: 2024-01-10 | End: 2024-01-17

## 2024-01-10 NOTE — PROGRESS NOTES
"Ani is a 43 year old who is being evaluated via a billable video visit.        Instructions Relayed to Patient by Virtual Roomer:     Patient is active on Pathflow:   Relayed following to patient: \"It looks like you are active on [employer], are you able to join the visit this way? If not, do you need us to send you a link now or would you like your provider to send a link via text or email when they are ready to initiate the visit?\"    Reminded patient to ensure they were logged on to virtual visit by arrival time listed. Documented in appointment notes if patient had flexibility to initiate visit sooner than arrival time. If pediatric virtual visit, ensured pediatric patient along with parent/guardian will be present for video visit.     Patient offered the website [email or web address] and/or phone number to Pathflow Help line: [phone]   How would you like to obtain your AVS? [employer]  If the video visit is dropped, the invitation should be resent by: Text to cell phone: [phone]  Will anyone else be joining your video visit? No        Assessment & Plan     Otalgia, right  Lymphadenopathy  Given the fact that she has had what sounds like viral infection after viral infection for several months and has had persistent right side lymphadenopathy with new onset of ear pain elected to treat with antibiotics for concerns of AOM.  Recommended OTC nasal steroids like Flonase, nasal saline rinses and Mucinex to help with symptoms.  Recommended antibiotic be taken with food in stomach to avoid GI side effects. If her symptoms are not starting to improve over the next 3-5 days recommended visit in clinic.  Discussed that the ear pressure and lymphadenopathy could take additional couple of weeks to fully clear.   - amoxicillin (AMOXIL) 875 MG tablet; Take 1 tablet (875 mg) by mouth 2 times daily for 7 days      Options for treatment and follow-up care were reviewed with the patient and/or guardian. " Patient and/or guardian engaged in the decision making process and verbalized understanding of the options discussed and agreed with the final plan.      NIELS [facility] MICHAEL Rossi is a 43 year old, presenting for the following health issues:  Thyroid Problem and Ear Problem        1/10/2024     3:05 PM   Additional Questions   Roomed by Quinton PARSON       History of Present Illness       Reason for visit:  Swollen lymph node, ear pain  Symptom onset:  3-4 weeks ago  Symptoms include:  Swollen lymph node, ear pressure/pain, blood in ear  Symptom intensity:  Mild  Symptom progression:  Worsening  Had these symptoms before:  No  What makes it worse:  Eating/drinking  What makes it better:  Rest    She eats 4 or more servings of fruits and vegetables daily.She consumes 0 sweetened beverage(s) daily.She exercises with enough effort to increase her heart rate 9 or less minutes per day.  She exercises with enough effort to increase her heart rate 3 or less days per week.   She is taking medications regularly.       Concern - Lump in neck under ear  Onset: ~1 month ago  Description: sick off on and on, pain in ear, noticed blood in ear when cleaning ear   Intensity: mild  Progression of Symptoms:  same  Accompanying Signs & Symptoms: n/a  Previous history of similar problem: n/a  Precipitating factors:        Worsened by: n/a  Alleviating factors:        Improved by: n/a  Therapies tried and outcome: None    Has been sick for a few months with colds and sinus issues.   Has a swollen lymph node on the right side, now having right side ear pain and noticed some dried blood on a Q-tip. The ear pain started about a week ago.  Swollen lymph node for about a month.    No treatments for symptoms.   She works at a school as well.   No recent fevers.   Tried some Sudafed for a couple of days in October.   No symptoms really on the left side, just pressure in left ear without swelling  and no swollen lymph nodes.   No drainage out of the ears.   No new congestion, sinus pressure, fevers/chills. No shortness of breath.   Pressure in ears is newer.   Has a cough, no chest pain. The cough has been present since October but has come and gone, not severe and is improving.     Review of Systems   Constitutional, HEENT, cardiovascular, pulmonary, gi and gu systems are negative, except as otherwise noted.      Objective           Vitals:  No vitals were obtained today due to virtual visit.    Physical Exam   GENERAL: Healthy, alert and no distress  EYES: Eyes grossly normal to inspection.  No discharge or erythema, or obvious scleral/conjunctival abnormalities.  RESP: No audible wheeze, cough, or visible cyanosis.  No visible retractions or increased work of breathing.    SKIN: Visible skin clear. No significant rash, abnormal pigmentation or lesions.  NEURO: Cranial nerves grossly intact.  Mentation and speech appropriate for age.  PSYCH: Mentation appears normal, affect normal/bright, judgement and insight intact, normal speech and appearance well-groomed.        Video-Visit Details    Type of service:  Video Visit     Originating Location (pt. Location): Home    Distant Location (provider location):  Off-site  Platform used for Video Visit: [employer]

## 2024-03-16 ENCOUNTER — HEALTH MAINTENANCE LETTER (OUTPATIENT)
Age: 44
End: 2024-03-16

## 2024-03-19 ENCOUNTER — OFFICE VISIT (OUTPATIENT)
Dept: FAMILY MEDICINE | Facility: CLINIC | Age: 44
End: 2024-03-19
Payer: COMMERCIAL

## 2024-03-19 ENCOUNTER — ANCILLARY PROCEDURE (OUTPATIENT)
Dept: MAMMOGRAPHY | Facility: CLINIC | Age: 44
End: 2024-03-19
Payer: COMMERCIAL

## 2024-03-19 VITALS
SYSTOLIC BLOOD PRESSURE: 135 MMHG | HEART RATE: 89 BPM | HEIGHT: 67 IN | WEIGHT: 209 LBS | OXYGEN SATURATION: 98 % | TEMPERATURE: 97.7 F | RESPIRATION RATE: 16 BRPM | BODY MASS INDEX: 32.8 KG/M2 | DIASTOLIC BLOOD PRESSURE: 83 MMHG

## 2024-03-19 DIAGNOSIS — Z12.31 VISIT FOR SCREENING MAMMOGRAM: ICD-10-CM

## 2024-03-19 DIAGNOSIS — R22.1 LOCALIZED SWELLING, MASS AND LUMP, NECK: Primary | ICD-10-CM

## 2024-03-19 LAB
BASOPHILS # BLD AUTO: 0.1 10E3/UL (ref 0–0.2)
BASOPHILS NFR BLD AUTO: 1 %
EOSINOPHIL # BLD AUTO: 0.4 10E3/UL (ref 0–0.7)
EOSINOPHIL NFR BLD AUTO: 6 %
ERYTHROCYTE [DISTWIDTH] IN BLOOD BY AUTOMATED COUNT: 12.6 % (ref 10–15)
HCT VFR BLD AUTO: 41.3 % (ref 35–47)
HGB BLD-MCNC: 13.1 G/DL (ref 11.7–15.7)
IMM GRANULOCYTES # BLD: 0 10E3/UL
IMM GRANULOCYTES NFR BLD: 0 %
LYMPHOCYTES # BLD AUTO: 1.9 10E3/UL (ref 0.8–5.3)
LYMPHOCYTES NFR BLD AUTO: 26 %
MCH RBC QN AUTO: 29.3 PG (ref 26.5–33)
MCHC RBC AUTO-ENTMCNC: 31.7 G/DL (ref 31.5–36.5)
MCV RBC AUTO: 92 FL (ref 78–100)
MONOCYTES # BLD AUTO: 0.4 10E3/UL (ref 0–1.3)
MONOCYTES NFR BLD AUTO: 5 %
NEUTROPHILS # BLD AUTO: 4.6 10E3/UL (ref 1.6–8.3)
NEUTROPHILS NFR BLD AUTO: 62 %
PLATELET # BLD AUTO: 250 10E3/UL (ref 150–450)
RBC # BLD AUTO: 4.47 10E6/UL (ref 3.8–5.2)
WBC # BLD AUTO: 7.3 10E3/UL (ref 4–11)

## 2024-03-19 PROCEDURE — 80053 COMPREHEN METABOLIC PANEL: CPT | Performed by: PHYSICIAN ASSISTANT

## 2024-03-19 PROCEDURE — 85025 COMPLETE CBC W/AUTO DIFF WBC: CPT | Performed by: PHYSICIAN ASSISTANT

## 2024-03-19 PROCEDURE — 36415 COLL VENOUS BLD VENIPUNCTURE: CPT | Performed by: PHYSICIAN ASSISTANT

## 2024-03-19 PROCEDURE — 99214 OFFICE O/P EST MOD 30 MIN: CPT | Performed by: PHYSICIAN ASSISTANT

## 2024-03-19 PROCEDURE — 77067 SCR MAMMO BI INCL CAD: CPT | Mod: GC | Performed by: STUDENT IN AN ORGANIZED HEALTH CARE EDUCATION/TRAINING PROGRAM

## 2024-03-19 PROCEDURE — 77063 BREAST TOMOSYNTHESIS BI: CPT | Mod: GC | Performed by: STUDENT IN AN ORGANIZED HEALTH CARE EDUCATION/TRAINING PROGRAM

## 2024-03-19 ASSESSMENT — PAIN SCALES - GENERAL: PAINLEVEL: NO PAIN (1)

## 2024-03-19 NOTE — PROGRESS NOTES
"  Assessment & Plan     Localized swelling, mass and lump, neck  - CBC with platelets and differential; Future  - CT Soft Tissue Neck w Contrast; Future  - Comprehensive metabolic panel (BMP + Alb, Alk Phos, ALT, AST, Total. Bili, TP); Future  - CBC with platelets and differential  - Comprehensive metabolic panel (BMP + Alb, Alk Phos, ALT, AST, Total. Bili, TP)    F/U to be determined base on CT and lab results. See results documentation for recommendations.     BMI  Estimated body mass index is 32.73 kg/m  as calculated from the following:    Height as of this encounter: 1.702 m (5' 7\").    Weight as of this encounter: 94.8 kg (209 lb).       Subjective   Enid is a 44 year old, presenting for the following health issues:  Swelling (Lymph nodes)      3/19/2024    11:31 AM   Additional Questions   Roomed by Jelena   Accompanied by self         3/19/2024    11:31 AM   Patient Reported Additional Medications   Patient reports taking the following new medications n/a     Via the Health Maintenance questionnaire, the patient has reported the following services have been completed -Mammogram-Cervical Cancer Screening, this information has been sent to the abstraction team.  Enid is a very pleasant 44 year old female who presents to clinic for evaluation of a \"lymph node\" that has been enlarged since December on the right side of her neck. 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 She says she does have some achiness in her right ear and some soreness in the anterior aspect of her neck just under the chin. She has some fatigue, but no night sweats, nausea, abd pain, changes in bowel or bladder, sudden/abnormal weight loss. No fam hx of lymphoma/leukemia. Mom had breast cancer and dad's sister and his mother passed from cancer related to smoking. Patient has had a recent dental visit with no gum or dental issues. No other concerns " "for this visit.     History of Present Illness       Reason for visit:  Swollen lymph node  Symptom onset:  More than a month  Symptoms include:  Swollen lymph node since December  Symptom intensity:  Mild  Symptom progression:  Staying the same  Had these symptoms before:  No  What makes it worse:  No  What makes it better:  No    She eats 4 or more servings of fruits and vegetables daily.She consumes 0 sweetened beverage(s) daily.She exercises with enough effort to increase her heart rate 9 or less minutes per day.  She exercises with enough effort to increase her heart rate 3 or less days per week.   She is taking medications regularly.        Review of Systems  Constitutional, neuro, ENT, endocrine, pulmonary, cardiac, gastrointestinal, genitourinary, musculoskeletal, integument and psychiatric systems are negative, except as otherwise noted.      Objective    /83   Pulse 89   Temp 97.7  F (36.5  C) (Tympanic)   Resp 16   Ht 1.702 m (5' 7\")   Wt 94.8 kg (209 lb)   LMP 02/29/2024 (Within Days)   SpO2 98%   Breastfeeding No   BMI 32.73 kg/m    Body mass index is 32.73 kg/m .  Physical Exam  Vitals reviewed.   Constitutional:       General: She is not in acute distress.     Appearance: Normal appearance. She is not ill-appearing, toxic-appearing or diaphoretic.   HENT:      Head:      Jaw: No trismus.      Salivary Glands: Right salivary gland is not diffusely enlarged. Left salivary gland is not diffusely enlarged.        Comments: No parotid swelling. No submandibular swelling. Posterior auricular mild swelling, TTP without ability to differentiate soft tissue vs lymph node. No other cervical, occipital or supraclavicular node enlargement.      Right Ear: Tympanic membrane, ear canal and external ear normal.      Left Ear: Tympanic membrane, ear canal and external ear normal.      Nose: No congestion or rhinorrhea.      Mouth/Throat:      Pharynx: No oropharyngeal exudate or posterior oropharyngeal " erythema.   Eyes:      Conjunctiva/sclera: Conjunctivae normal.   Neck:     Cardiovascular:      Rate and Rhythm: Normal rate and regular rhythm.      Heart sounds: Normal heart sounds, S1 normal and S2 normal. No murmur heard.  Pulmonary:      Effort: Pulmonary effort is normal.      Breath sounds: Normal breath sounds.   Neurological:      Mental Status: She is alert and oriented to person, place, and time.   Psychiatric:         Mood and Affect: Mood normal.         Speech: Speech normal.         Behavior: Behavior normal.         Thought Content: Thought content normal.            Results for orders placed or performed in visit on 03/19/24 (from the past 24 hour(s))   CBC with platelets and differential    Narrative    The following orders were created for panel order CBC with platelets and differential.  Procedure                               Abnormality         Status                     ---------                               -----------         ------                     CBC with platelets and d...[[id number]]                      Final result                 Please view results for these tests on the individual orders.   CBC with platelets and differential   Result Value Ref Range    WBC Count 7.3 4.0 - 11.0 10e3/uL    RBC Count 4.47 3.80 - 5.20 10e6/uL    Hemoglobin 13.1 11.7 - 15.7 g/dL    Hematocrit 41.3 35.0 - 47.0 %    MCV 92 78 - 100 fL    MCH 29.3 26.5 - 33.0 pg    MCHC 31.7 31.5 - 36.5 g/dL    RDW 12.6 10.0 - 15.0 %    Platelet Count 250 150 - 450 10e3/uL    % Neutrophils 62 %    % Lymphocytes 26 %    % Monocytes 5 %    % Eosinophils 6 %    % Basophils 1 %    % Immature Granulocytes 0 %    Absolute Neutrophils 4.6 1.6 - 8.3 10e3/uL    Absolute Lymphocytes 1.9 0.8 - 5.3 10e3/uL    Absolute Monocytes 0.4 0.0 - 1.3 10e3/uL    Absolute Eosinophils 0.4 0.0 - 0.7 10e3/uL    Absolute Basophils 0.1 0.0 - 0.2 10e3/uL    Absolute Immature Granulocytes 0.0 <=0.4 10e3/uL           Signed Electronically by:  ABY BOSTON PA-C

## 2024-03-20 LAB
ALBUMIN SERPL BCG-MCNC: 4.5 G/DL (ref 3.5–5.2)
ALP SERPL-CCNC: 56 U/L (ref 40–150)
ALT SERPL W P-5'-P-CCNC: 15 U/L (ref 0–50)
ANION GAP SERPL CALCULATED.3IONS-SCNC: 11 MMOL/L (ref 7–15)
AST SERPL W P-5'-P-CCNC: 23 U/L (ref 0–45)
BILIRUB SERPL-MCNC: 0.7 MG/DL
BUN SERPL-MCNC: 9.7 MG/DL (ref 6–20)
CALCIUM SERPL-MCNC: 9.5 MG/DL (ref 8.6–10)
CHLORIDE SERPL-SCNC: 105 MMOL/L (ref 98–107)
CREAT SERPL-MCNC: 0.78 MG/DL (ref 0.51–0.95)
DEPRECATED HCO3 PLAS-SCNC: 25 MMOL/L (ref 22–29)
EGFRCR SERPLBLD CKD-EPI 2021: >90 ML/MIN/1.73M2
GLUCOSE SERPL-MCNC: 115 MG/DL (ref 70–99)
POTASSIUM SERPL-SCNC: 4.1 MMOL/L (ref 3.4–5.3)
PROT SERPL-MCNC: 6.9 G/DL (ref 6.4–8.3)
SODIUM SERPL-SCNC: 141 MMOL/L (ref 135–145)

## 2024-03-27 ENCOUNTER — ANCILLARY PROCEDURE (OUTPATIENT)
Dept: CT IMAGING | Facility: CLINIC | Age: 44
End: 2024-03-27
Attending: PHYSICIAN ASSISTANT
Payer: COMMERCIAL

## 2024-03-27 DIAGNOSIS — R22.1 LOCALIZED SWELLING, MASS AND LUMP, NECK: ICD-10-CM

## 2024-03-27 PROCEDURE — 70491 CT SOFT TISSUE NECK W/DYE: CPT | Mod: GC | Performed by: RADIOLOGY

## 2024-03-27 RX ORDER — IOPAMIDOL 755 MG/ML
90 INJECTION, SOLUTION INTRAVASCULAR ONCE
Status: COMPLETED | OUTPATIENT
Start: 2024-03-27 | End: 2024-03-27

## 2024-03-27 RX ADMIN — IOPAMIDOL 90 ML: 755 INJECTION, SOLUTION INTRAVASCULAR at 17:43

## 2024-04-01 ENCOUNTER — TELEPHONE (OUTPATIENT)
Dept: OTOLARYNGOLOGY | Facility: CLINIC | Age: 44
End: 2024-04-01
Payer: COMMERCIAL

## 2024-04-01 DIAGNOSIS — K11.8 PAROTID MASS: Primary | ICD-10-CM

## 2024-04-01 NOTE — TELEPHONE ENCOUNTER
[facility] Call Center    Phone Message    May a detailed message be left on voicemail: no     Reason for Call: Appointment Intake    Referring Provider Name: Mally Johns PA-C   Diagnosis and/or Symptoms: Parotid mass     PLEASE REVIEW FOR MASS PER PROTOCOLS    Action Taken: Message routed to:  [facility] ([facility] ENT    Travel Screening: Not Applicable

## 2024-04-01 NOTE — TELEPHONE ENCOUNTER
FUTURE VISIT INFORMATION      FUTURE VISIT INFORMATION:  Date: 5/14/24  Time: 10:15AM  Location: [facility]  REFERRAL INFORMATION:  Referring provider:  Mally Johns PA-C  Referring providers clinic:  [facility]   Reason for visit/diagnosis  Parotid mass referred by Mally Johns PA-C in [facility]     RECORDS REQUESTED FROM:       Clinic name Comments Records Status Imaging Status   [facility]   3/19/24- Ov Mally oJhns PA-C Epic     Imaging  3/27/24- CT Neck  Epic  Pacs

## 2024-05-14 ENCOUNTER — OFFICE VISIT (OUTPATIENT)
Dept: OTOLARYNGOLOGY | Facility: CLINIC | Age: 44
End: 2024-05-14
Attending: PHYSICIAN ASSISTANT
Payer: COMMERCIAL

## 2024-05-14 ENCOUNTER — PRE VISIT (OUTPATIENT)
Dept: OTOLARYNGOLOGY | Facility: CLINIC | Age: 44
End: 2024-05-14

## 2024-05-14 VITALS
OXYGEN SATURATION: 98 % | WEIGHT: 213.2 LBS | DIASTOLIC BLOOD PRESSURE: 84 MMHG | BODY MASS INDEX: 33.46 KG/M2 | SYSTOLIC BLOOD PRESSURE: 135 MMHG | HEART RATE: 79 BPM | HEIGHT: 67 IN

## 2024-05-14 DIAGNOSIS — K11.8 PAROTID MASS: ICD-10-CM

## 2024-05-14 PROCEDURE — 99204 OFFICE O/P NEW MOD 45 MIN: CPT | Performed by: OTOLARYNGOLOGY

## 2024-05-14 ASSESSMENT — PAIN SCALES - GENERAL: PAINLEVEL: NO PAIN (0)

## 2024-05-14 NOTE — PROGRESS NOTES
Otolaryngology Clinic      Name: Enid Cisneros  MRN: [id number]  Age: 44 year old  : 1980  Referring provider: Mally Johns  2024      Chief Complaint:  Consultation    History of Present Illness:   Enid Cisneros is a 44 year old female with a history of Lyme Disease who presents for consultation regarding a right-sided parotid mass. She states that the mass has been enlarging since December of last year. She also endorses right ear ache and neck soreness. 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      Active Medications:   No current outpatient medications on file.      Allergies:   Clindamycin      Past Medical History:  Past Medical History:   Diagnosis Date    Closed fracture of unspecified part of humerus     fx of radius    Lyme disease     history of    PAC (premature atrial contraction) 2013    Holter    PVC (premature ventricular contraction) 2013    Holter     Patient Active Problem List   Diagnosis    CARDIOVASCULAR SCREENING; LDL GOAL LESS THAN 160    PAC (premature atrial contraction)    PVC (premature ventricular contraction)    S/P         Past Surgical History:  Past Surgical History:   Procedure Laterality Date    ------------OTHER-------------      Lipoma removal     SECTION N/A 2018    Procedure:  SECTION;  Primary  Section ;  Surgeon: Ana Rosa Arellano MD;  [facility]: [facility]    LAMINECT/DISCECTOMY, LUMBAR  2002    TONSILLECTOMY         Family History:   Family History   Problem Relation Age of Onset    Cancer Mother         breast    No Known Problems Father     Cerebrovascular Disease Maternal Grandmother     Diabetes Maternal Grandmother         adult-onset    Heart Disease Maternal Grandfather     Cancer Paternal Grandmother     Heart Disease Paternal Grandfather     No Known Problems Brother          Social History:   Social History     Tobacco Use    Smoking status: Never     "Smokeless tobacco: Never   Vaping Use    Vaping status: Never Used   Substance Use Topics    Alcohol use: No    Drug use: No       Review of Systems:   Pertinent items are noted in HPI or as in patient entered ROS below, remainder of complete ROS is negative.       5/7/2024     8:50 AM    ENT ROS   Constitutional Unexplained fatigue   Neurology Dizzy spells    Headache   Psychology Frequently feeling anxious   Ears, Nose, Throat Ear pain    Nasal congestion or drainage    Sore throat    Trouble swallowing   Musculoskeletal Neck pain   Allergy/Immunology Allergies or hay fever         Physical Exam:   /84   Pulse 79   Ht 1.702 m (5' 7\")   Wt 96.7 kg (213 lb 3.2 oz)   LMP 02/29/2024 (Within Days)   SpO2 98%   BMI 33.39 kg/m       Constitutional:  The patient was unaccompanied, well-groomed, and in no acute distress.    Skin:  Warm and pink.    Neurologic:  Alert and oriented x 3.  CN's III-XII within normal limits.  Voice normal.   Psychiatric:  The patient's affect was calm, cooperative, and appropriate.    Respiratory:  Breathing comfortably without stridor or exertion of accessory muscles.    Eyes: Extraocular movement intact.    Head:  Normocephalic and atraumatic.  No lesions or scars.    Ears:  Pinnae and tragus non-tender.  EAC's and TM's were clear.   Nose:  Sinuses were non-tender.  Anterior rhinoscopy revealed midline septum and absence of purulence or polyps.    OC/OP:  Normal tongue, floor of mouth, buccal mucosa, and palate.  No lesions or masses on inspection or palpation.  No abnormal lymph tissue in the oropharynx.  The pterygoid region is non-tender.    Neck:  Supple with normal laryngeal and tracheal landmarks.  The parotid beds were without masses.  No palpable thyroid.  Lymphatic:  There is no palpable lymphadenopathy in the neck.     Imaging:  CT SOFT TISSUE NECK W CONTRAST 3/27/2024   Impression: Avidly enhancing lesion in the right parotid gland is most  likely representative of a " pleomorphic adenoma, which appears amenable  to fine-needle aspiration.       Assessment and Plan:    ICD-10-CM    1. Parotid mass  K11.8 Adult ENT  Referral     MR Soft Tissue Neck w/o & w Contrast         Enid Cisneros is a 44 year old female with a history of Lyme Disease who presents for consultation regarding a right-sided parotid mass.     Follow-up: No follow-ups on file.         Scribe Disclosure:   I, Paige Cruz, am serving as a scribe; to document services personally performed by David Meza MD -based on data collection and the provider's statements to me.     Provider Disclosure:  I agree with above History, Review of Systems, Physical exam and Plan.  I have reviewed the content of the documentation and have edited it as needed. I have personally performed the services documented here and the documentation accurately represents those services and the decisions I have made.      Electronically signed by:

## 2024-05-14 NOTE — LETTER
2024       RE: Enid Cisneros  [address]  [place] [address]     Dear Colleague,    Thank you for referring your patient, Enid Cisneros, to the [facility] E[facility] NOSE [facility] TH[facility]INIC [place] at [facility]. Please see a copy of my visit note below.      Otolaryngology Clinic      Name: Enid Cisneros  MRN: [id number]  Age: 44 year old  : 1980  Referring provider: Mally Johns  2024      Chief Complaint:  Consultation    History of Present Illness:   Enid Cisneros is a 44 year old female with a history of Lyme Disease who presents for consultation regarding a right-sided parotid mass. She states that the mass has been enlarging since December of last year. She also endorses right ear ache and neck soreness. 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      Active Medications:   No current outpatient medications on file.      Allergies:   Clindamycin      Past Medical History:  Past Medical History:   Diagnosis Date     Closed fracture of unspecified part of humerus     fx of radius     Lyme disease     history of     PAC (premature atrial contraction) 2013    Holter     PVC (premature ventricular contraction) 2013    Holter     Patient Active Problem List   Diagnosis     CARDIOVASCULAR SCREENING; LDL GOAL LESS THAN 160     PAC (premature atrial contraction)     PVC (premature ventricular contraction)     S/P         Past Surgical History:  Past Surgical History:   Procedure Laterality Date     ------------OTHER-------------      Lipoma removal      SECTION N/A 2018    Procedure:  SECTION;  Primary  Section ;  Surgeon: Ana Rosa Arellano MD;  [facility]: [facility]     LAMINECT/DISCECTOMY, LUMBAR  2002     TONSILLECTOMY  2009       Family History:   Family History   Problem Relation Age of Onset     Cancer Mother         breast     No Known  "Problems Father      Cerebrovascular Disease Maternal Grandmother      Diabetes Maternal Grandmother         adult-onset     Heart Disease Maternal Grandfather      Cancer Paternal Grandmother      Heart Disease Paternal Grandfather      No Known Problems Brother          Social History:   Social History     Tobacco Use     Smoking status: Never     Smokeless tobacco: Never   Vaping Use     Vaping status: Never Used   Substance Use Topics     Alcohol use: No     Drug use: No       Review of Systems:   Pertinent items are noted in HPI or as in patient entered ROS below, remainder of complete ROS is negative.       5/7/2024     8:50 AM    ENT ROS   Constitutional Unexplained fatigue   Neurology Dizzy spells    Headache   Psychology Frequently feeling anxious   Ears, Nose, Throat Ear pain    Nasal congestion or drainage    Sore throat    Trouble swallowing   Musculoskeletal Neck pain   Allergy/Immunology Allergies or hay fever         Physical Exam:   /84   Pulse 79   Ht 1.702 m (5' 7\")   Wt 96.7 kg (213 lb 3.2 oz)   LMP 02/29/2024 (Within Days)   SpO2 98%   BMI 33.39 kg/m       Constitutional:  The patient was unaccompanied, well-groomed, and in no acute distress.    Skin:  Warm and pink.    Neurologic:  Alert and oriented x 3.  CN's III-XII within normal limits.  Voice normal.   Psychiatric:  The patient's affect was calm, cooperative, and appropriate.    Respiratory:  Breathing comfortably without stridor or exertion of accessory muscles.    Eyes: Extraocular movement intact.    Head:  Normocephalic and atraumatic.  No lesions or scars.    Ears:  Pinnae and tragus non-tender.  EAC's and TM's were clear.   Nose:  Sinuses were non-tender.  Anterior rhinoscopy revealed midline septum and absence of purulence or polyps.    OC/OP:  Normal tongue, floor of mouth, buccal mucosa, and palate.  No lesions or masses on inspection or palpation.  No abnormal lymph tissue in the oropharynx.  The pterygoid region is " non-tender.    Neck:  Supple with normal laryngeal and tracheal landmarks.  The parotid beds were without masses.  No palpable thyroid.  Lymphatic:  There is no palpable lymphadenopathy in the neck.     Imaging:  CT SOFT TISSUE NECK W CONTRAST 3/27/2024   Impression: Avidly enhancing lesion in the right parotid gland is most  likely representative of a pleomorphic adenoma, which appears amenable  to fine-needle aspiration.       Assessment and Plan:    ICD-10-CM    1. Parotid mass  K11.8 Adult ENT  Referral     MR Soft Tissue Neck w/o & w Contrast         Enid Cisneros is a 44 year old female with a history of Lyme Disease who presents for consultation regarding a right-sided parotid mass.     Follow-up: No follow-ups on file.         Scribe Disclosure:   I, Paige Cruz, am serving as a scribe; to document services personally performed by David Meza MD -based on data collection and the provider's statements to me.     Provider Disclosure:  I agree with above History, Review of Systems, Physical exam and Plan.  I have reviewed the content of the documentation and have edited it as needed. I have personally performed the services documented here and the documentation accurately represents those services and the decisions I have made.      Electronically signed by:          Again, thank you for allowing me to participate in the care of your patient.      Sincerely,    David Meza MD

## 2024-06-13 ENCOUNTER — ANCILLARY PROCEDURE (OUTPATIENT)
Dept: MRI IMAGING | Facility: CLINIC | Age: 44
End: 2024-06-13
Attending: OTOLARYNGOLOGY
Payer: COMMERCIAL

## 2024-06-13 DIAGNOSIS — K11.8 PAROTID MASS: ICD-10-CM

## 2024-06-13 PROCEDURE — A9585 GADOBUTROL INJECTION: HCPCS | Mod: JZ | Performed by: RADIOLOGY

## 2024-06-13 PROCEDURE — 70543 MRI ORBT/FAC/NCK W/O &W/DYE: CPT | Mod: GC | Performed by: RADIOLOGY

## 2024-06-13 RX ORDER — GADOBUTROL 604.72 MG/ML
9.5 INJECTION INTRAVENOUS ONCE
Status: COMPLETED | OUTPATIENT
Start: 2024-06-13 | End: 2024-06-13

## 2024-06-13 RX ADMIN — GADOBUTROL 9.5 ML: 604.72 INJECTION INTRAVENOUS at 09:24

## 2024-07-02 ENCOUNTER — VIRTUAL VISIT (OUTPATIENT)
Dept: OTOLARYNGOLOGY | Facility: CLINIC | Age: 44
End: 2024-07-02
Payer: COMMERCIAL

## 2024-07-02 DIAGNOSIS — K11.8 PAROTID MASS: Primary | ICD-10-CM

## 2024-07-02 PROCEDURE — 99441 PR PHYSICIAN TELEPHONE EVALUATION 5-10 MIN: CPT | Mod: 93 | Performed by: OTOLARYNGOLOGY

## 2024-07-02 NOTE — PROGRESS NOTES
Called the patient today about her MRI findings.  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  I do not think that this requires to have an FNA first however I would like there to be some commentary from the radiologist about the MRI finding.  We talked about the benefits and risks of surgery we will schedule her for surgery after her appointment in the near future to make sure there is no change in size.  It sounds like she may get some postobstructive inflammation in the gland as well.  This may be because the tumor is pressing on prior to collection system of saliva.  We had 10 minutes on the chart with her today.

## 2024-07-02 NOTE — LETTER
7/2/2024       RE: Enid Cisneros  [address]  [place] [address]     Dear Colleague,    Thank you for referring your patient, Enid Cisneros, to the [facility] NOSE AND THROAT CLINIC [place] at [facility]. Please see a copy of my visit note below.    Called the patient today about her MRI findings.  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  I do not think that this requires to have an FNA first however I would like there to be some commentary from the radiologist about the MRI finding.  We talked about the benefits and risks of surgery we will schedule her for surgery after her appointment in the near future to make sure there is no change in size.  It sounds like she may get some postobstructive inflammation in the gland as well.  This may be because the tumor is pressing on prior to collection system of saliva.  We had 10 minutes on the chart with her today.      Again, thank you for allowing me to participate in the care of your patient.      Sincerely,    David Meza MD

## 2024-08-06 ENCOUNTER — OFFICE VISIT (OUTPATIENT)
Dept: OTOLARYNGOLOGY | Facility: CLINIC | Age: 44
End: 2024-08-06
Attending: OTOLARYNGOLOGY
Payer: COMMERCIAL

## 2024-08-06 ENCOUNTER — PREP FOR PROCEDURE (OUTPATIENT)
Dept: OTOLARYNGOLOGY | Facility: CLINIC | Age: 44
End: 2024-08-06

## 2024-08-06 VITALS
HEART RATE: 84 BPM | DIASTOLIC BLOOD PRESSURE: 83 MMHG | BODY MASS INDEX: 33.1 KG/M2 | SYSTOLIC BLOOD PRESSURE: 119 MMHG | OXYGEN SATURATION: 97 % | WEIGHT: 210.9 LBS | HEIGHT: 67 IN

## 2024-08-06 DIAGNOSIS — K11.8 PAROTID MASS: ICD-10-CM

## 2024-08-06 DIAGNOSIS — K11.8 PAROTID MASS: Primary | ICD-10-CM

## 2024-08-06 PROCEDURE — 99212 OFFICE O/P EST SF 10 MIN: CPT | Performed by: OTOLARYNGOLOGY

## 2024-08-06 ASSESSMENT — PAIN SCALES - GENERAL: PAINLEVEL: NO PAIN (0)

## 2024-08-06 NOTE — LETTER
8/6/2024       RE: Enid Cisneros  [address]  [facility] [address]     Dear Colleague,    Thank you for referring your patient, Enid Cisneros, to the [facility] [place] at [facility]. Please see a copy of my visit note below.    Teaching Flowsheet - ENT   Relevant Diagnosis: right parotid mass  Teaching Topic:Person(s) involved in teaching: patient        Motivation Level:  Asks Questions:   Yes  Eager to Learn:   Yes  Cooperative:   Yes  Receptive (willing/able to accept information):   Yes  Comments: Reviewed pre-op H and P,  NPO prior to  surgery,  pre-op scrub. Reviewed post-op  cares, activity and pain.     Patient demonstrates understanding of the following:  Reason for the appointment, diagnosis and treatment plan:   Yes  Knowledge of proper use of medications and conditions for which they are ordered (with special attention to potential side effects or drug interactions):  stop aspirin products 1 week before surgery Yes  Which situations necessitate calling provider and whom to contact:   Yes  Nutritional needs and diet plan:   Yes  Pain management techniques:   Yes  Patient instructed on hand hygiene:  Yes  How and/when to access community resources:   Yes     Infection Prevention:  Patient   demonstrates understanding of the following:  Surgical procedure site care taught Yes  Signs and symptoms of infection taught Yes  Wound care taught Yes  Instructional Materials Used/Given: verbal instruction.     The patient has a 2.5 cm MRI positive acid tail of the parotid cyst to be a pleomorphic adenoma by the imaging characteristics as read by Dr. Nichols.  She has no complaints about this at present time today as well    Patient is alert and oriented x 3 and pleasant skin aphasic secondary is normal palpating the parotid feels mobile mass in the tail of the parotid is not tenderness on the right side no other masses  are palpable OROPHARYNX clear facial nerve is intact assessments patient with a history of right parotid mass this may be all be done by an extracapsular parotidectomy which would perform on her in the near future she understands benefits risks of procedure like to proceed        Again, thank you for allowing me to participate in the care of your patient.      Sincerely,    David Meza MD

## 2024-08-06 NOTE — PROGRESS NOTES
The patient has a 2.5 cm MRI positive acid tail of the parotid cyst to be a pleomorphic adenoma by the imaging characteristics as read by Dr. Nichols.  She has no complaints about this at present time today as well    Patient is alert and oriented x 3 and pleasant skin aphasic secondary is normal palpating the parotid feels mobile mass in the tail of the parotid is not tenderness on the right side no other masses are palpable OROPHARYNX clear facial nerve is intact assessments patient with a history of right parotid mass this may be all be done by an extracapsular parotidectomy which would perform on her in the near future she understands benefits risks of procedure like to proceed

## 2024-08-06 NOTE — PROGRESS NOTES
Teaching Flowsheet - ENT   Relevant Diagnosis: right parotid mass  Teaching Topic:Person(s) involved in teaching: patient        Motivation Level:  Asks Questions:   Yes  Eager to Learn:   Yes  Cooperative:   Yes  Receptive (willing/able to accept information):   Yes  Comments: Reviewed pre-op H and P,  NPO prior to  surgery,  pre-op scrub. Reviewed post-op  cares, activity and pain.     Patient demonstrates understanding of the following:  Reason for the appointment, diagnosis and treatment plan:   Yes  Knowledge of proper use of medications and conditions for which they are ordered (with special attention to potential side effects or drug interactions):  stop aspirin products 1 week before surgery Yes  Which situations necessitate calling provider and whom to contact:   Yes  Nutritional needs and diet plan:   Yes  Pain management techniques:   Yes  Patient instructed on hand hygiene:  Yes  How and/when to access community resources:   Yes     Infection Prevention:  Patient   demonstrates understanding of the following:  Surgical procedure site care taught Yes  Signs and symptoms of infection taught Yes  Wound care taught Yes  Instructional Materials Used/Given: verbal instruction.

## 2024-08-08 PROBLEM — K11.8 PAROTID MASS: Status: ACTIVE | Noted: 2024-08-06

## 2024-08-19 ENCOUNTER — TELEPHONE (OUTPATIENT)
Dept: OTOLARYNGOLOGY | Facility: CLINIC | Age: 44
End: 2024-08-19
Payer: COMMERCIAL

## 2024-08-19 NOTE — TELEPHONE ENCOUNTER
Left patient a voicemail to schedule surgery for Excision of Right Parotid Gland (Right) with Dr. Meza - Left Surgery Scheduling line for callback [phone]    Lilli Schneider on 8/19/2024 at 3:57 PM

## 2024-08-21 NOTE — TELEPHONE ENCOUNTER
Scheduled surgery with Dr. David Meza on 10/7/2024    Spoke with: Enid (patient)    Surgery is located at [facility]    Patient will be seen for their H&P by their PCP Dorothy MCPHERSON within 30 days of surgery - Confirmed PCP on file is up to date     Does patient need a consult before upcoming surgery? No    Anesthesia type: General    Requested Imaging required for surgery: No    Patient is scheduled for their 1 week post op on 10/15/2024 at 3:00 PM with Dr. David Meza    Patient will receive their surgery packet via [employer] per their preference    Patient was not provided a start time for surgery & is aware they will receive this information 2-3 days before surgery    Informed patient that they will need a  for surgery. Patient states that [facility] will be bringing them to/from surgery    Additional comments:      Patient was instructed to call back with any further questions or concerns.     Izaiah Gomez on 8/21/2024 at 11:02 AM

## 2024-08-26 NOTE — TELEPHONE ENCOUNTER
I called patient in regards to surgery with Dr. David Meza. I was unable to reach patient, but a voicemail and a call back number were left on patients voicemail.        Izaiah Gomez on 8/26/2024 at 10:27 AM

## 2024-08-26 NOTE — TELEPHONE ENCOUNTER
Rescheduled surgery with Dr. Meza from 10/7/2024 to 10/21/2024    Spoke with: Patient    Reason for reschedule: not enough time on schedule for her surgery 10/7    Surgery is located at [address] [address]    Patient is aware their H&P will need to be within 30 days of their new surgery date     Is there imaging that needs to be rescheduled for new surgery date? No    Patients 1 week post op has been rescheduled to 10/29/2024 at 815am    Additional comments: Patient will call back if they have any questions or concerns.    Lilli Schneider on 8/26/2024 at 10:50 AM

## 2024-10-02 ENCOUNTER — OFFICE VISIT (OUTPATIENT)
Dept: FAMILY MEDICINE | Facility: CLINIC | Age: 44
End: 2024-10-02
Payer: COMMERCIAL

## 2024-10-02 VITALS
TEMPERATURE: 98 F | BODY MASS INDEX: 31.22 KG/M2 | HEART RATE: 87 BPM | HEIGHT: 68 IN | OXYGEN SATURATION: 97 % | RESPIRATION RATE: 18 BRPM | DIASTOLIC BLOOD PRESSURE: 74 MMHG | SYSTOLIC BLOOD PRESSURE: 102 MMHG | WEIGHT: 206 LBS

## 2024-10-02 DIAGNOSIS — Z01.818 PREOP GENERAL PHYSICAL EXAM: Primary | ICD-10-CM

## 2024-10-02 DIAGNOSIS — K11.8 PAROTID MASS: ICD-10-CM

## 2024-10-02 PROCEDURE — 99214 OFFICE O/P EST MOD 30 MIN: CPT | Performed by: FAMILY MEDICINE

## 2024-10-02 ASSESSMENT — PAIN SCALES - GENERAL: PAINLEVEL: NO PAIN (0)

## 2024-10-02 NOTE — PROGRESS NOTES
Preoperative Evaluation  [address] [address]  Phone: [phone]  Fax: [phone]  Primary Provider: Physician No Ref-Primary  Pre-op Performing Provider: Ana Rosa Lawson MD  Oct 2, 2024             9/30/2024   Surgical Information   What procedure is being done? Pre op   Facility or Hospital where procedure/surgery will be performed: [facility]   Who is doing the procedure / surgery? Dr Posadas   Date of surgery / procedure: 10/21/2024   Time of surgery / procedure: Not sure   Where do you plan to recover after surgery? at home with family        Fax number for surgical facility: Note does not need to be faxed, will be available electronically in Epic.    Assessment & Plan     The proposed surgical procedure is considered INTERMEDIATE risk.    (Z01.818) Preop general physical exam  (primary encounter diagnosis)  Comment: Pt cleared for surgical intervention.     (K11.8) Parotid mass  Comment: See MRI report below. Not currently abutting any other structures unlikely to pose an issues with intubation. No further workup needed prior to excision. Follow up for AWV and to establish with new PCP               - No identified additional risk factors other than previously addressed         Recommendation  Approval given to proceed with proposed procedure, without further diagnostic evaluation.    Chanel Rossi is a 44 year old, presenting for the following:  Pre-Op Exam  Patient is a healthy 44-year-old female here for preoperative examination for a right sided parotid gland excision.  She initially presented in March of this year with worsening localized swelling on the right side of her face and CT scan was done at that time which showed a well-circumscribed ovoid lesion in the right parotid gland.  An MRI was done after evaluation with ENT in the middle part of this year which is noted below.  Patient is scheduled for an excision of the right  parotid gland on 10/21/2024.         10/2/2024     8:55 AM   Additional Questions   Roomed by Nazario MORALES related to upcoming procedure:         2024   Pre-Op Questionnaire   Have you ever had a heart attack or stroke? No   Have you ever had surgery on your heart or blood vessels, such as a stent placement, a coronary artery bypass, or surgery on an artery in your head, neck, heart, or legs? No   Do you have chest pain with activity? No   Do you have a history of heart failure? No   Do you currently have a cold, bronchitis or symptoms of other infection? No   Do you have a cough, shortness of breath, or wheezing? No   Do you or anyone in your family have previous history of blood clots? No   Do you or does anyone in your family have a serious bleeding problem such as prolonged bleeding following surgeries or cuts? No   Have you ever had problems with anemia or been told to take iron pills? No   Have you had any abnormal blood loss such as black, tarry or bloody stools, or abnormal vaginal bleeding? No   Have you ever had a blood transfusion? No   Are you willing to have a blood transfusion if it is medically needed before, during, or after your surgery? Yes   Have you or any of your relatives ever had problems with anesthesia? No   Do you have sleep apnea, excessive snoring or daytime drowsiness? No   Do you have any artifical heart valves or other implanted medical devices like a pacemaker, defibrillator, or continuous glucose monitor? No   Do you have artificial joints? No   Are you allergic to latex? No        Health Care Directive  Patient does not have a Health Care Directive or Living Will: Discussed advance care planning with patient; however, patient declined at this time.    Preoperative Review of    reviewed - no record of controlled substances prescribed.          Patient Active Problem List    Diagnosis Date Noted    Parotid mass 2024     Priority: Medium    S/P  2018  "    Priority: Medium    PAC (premature atrial contraction) 2013     Priority: Medium     Holter      PVC (premature ventricular contraction) 2013     Priority: Medium     Holter      CARDIOVASCULAR SCREENING; LDL GOAL LESS THAN 160 2011     Priority: Medium      Past Medical History:   Diagnosis Date    Closed fracture of unspecified part of humerus 1981    fx of radius    Lyme disease     history of    PAC (premature atrial contraction) 2013    Holter    PVC (premature ventricular contraction) 2013    Holter     Past Surgical History:   Procedure Laterality Date    ------------OTHER-------------      Lipoma removal     SECTION N/A 2018    Procedure:  SECTION;  Primary  Section ;  Surgeon: Ana Rosa Arellano MD;  Location: [facility]    LAMINECT/DISCECTOMY, LUMBAR  2002    TONSILLECTOMY  2009     No current outpatient medications on file.       Allergies   Allergen Reactions    Clindamycin Rash        Social History     Tobacco Use    Smoking status: Never    Smokeless tobacco: Never   Substance Use Topics    Alcohol use: No     Family History   Problem Relation Age of Onset    Cancer Mother         breast    No Known Problems Father     Cerebrovascular Disease Maternal Grandmother     Diabetes Maternal Grandmother         adult-onset    Heart Disease Maternal Grandfather     Cancer Paternal Grandmother     Heart Disease Paternal Grandfather     No Known Problems Brother      History   Drug Use No           Comprehensive Ros negative/normal.     Objective    /74   Pulse 87   Temp 98  F (36.7  C) (Temporal)   Resp 18   Ht 1.727 m (5' 8\")   Wt 93.4 kg (206 lb)   LMP 2024 (Exact Date)   SpO2 97%   BMI 31.32 kg/m     Estimated body mass index is 31.32 kg/m  as calculated from the following:    Height as of this encounter: 1.727 m (5' 8\").    Weight as of this encounter: 93.4 kg (206 lb).  Physical Exam  Constitutional:       General: She is " not in acute distress.     Appearance: Normal appearance. She is not ill-appearing, toxic-appearing or diaphoretic.   HENT:      Head: Normocephalic.      Right Ear: Tympanic membrane normal.      Left Ear: Tympanic membrane normal.      Nose: Nose normal.      Mouth/Throat:      Mouth: Mucous membranes are moist.   Eyes:      General: No scleral icterus.     Pupils: Pupils are equal, round, and reactive to light.   Cardiovascular:      Rate and Rhythm: Normal rate and regular rhythm.      Pulses: Normal pulses.      Heart sounds: No murmur heard.  Pulmonary:      Effort: Pulmonary effort is normal. No respiratory distress.      Breath sounds: Normal breath sounds. No stridor. No wheezing, rhonchi or rales.   Abdominal:      General: Abdomen is flat.      Palpations: Abdomen is soft.   Skin:     General: Skin is warm and dry.      Capillary Refill: Capillary refill takes less than 2 seconds.   Neurological:      Mental Status: She is alert. Mental status is at baseline.   Psychiatric:         Mood and Affect: Mood normal.         Behavior: Behavior normal.         Thought Content: Thought content normal.         Judgment: Judgment normal.           Recent Labs   Lab Test 03/19/24  1158   HGB 13.1         POTASSIUM 4.1   CR 0.78        Diagnostics  MR SOFT TISSUE NECK W/O & W CONTRAST 6/13/2024 9:26 AM     History:  parotid mass; Parotid mass     Comparison:  03/27/2024 CT neck     Technique: Sagittal and axial T1-weighted and axial T2-weighted images  with fat saturation of the neck from the skull base through the upper  mediastinum were obtained without intravenous contrast. Following  intravenous administration of gadolinium, axial and coronal  T1-weighted images with fat saturation of the neck were also obtained.     Contrast: 9.5 ml gadavist     Findings:     Within the medial aspect of the superficial lobe of the right parotid  gland, there is a well-circumscribed, avidly enhancing, soft  tissue  lesion measuring 23 x 17 mm (series 9, image 36). The left parotid  gland and submandibular glands appear normal.     No pathologically enlarged cervical chain lymph nodes. The major  vascular structures in the neck are unremarkable. Visualized portions  of the cerebral parenchyma and cerebellum are unremarkable. Visualized  portions of the paranasal sinuses are unremarkable with a small  mucosal polyp in the left maxillary sinus. Mastoid air cells are  clear.                                                                      Impression:  Avidly enhancing lesion within the medial aspect of the  right parotid superficial lobe is most consistent with a pleomorphic  adenoma.     I have personally reviewed the examination and initial interpretation  and I agree with the findings.     TAVARES PALACIOS MD         SYSTEM ID:  [id number]    Revised Cardiac Risk Index (RCRI)  The patient has the following serious cardiovascular risks for perioperative complications:   - No serious cardiac risks = 0 points     RCRI Interpretation: 0 points: Class I (very low risk - 0.4% complication rate)         Signed Electronically by: Ana Rosa Lawson MD  A copy of this evaluation report is provided to the requesting physician.

## 2024-10-12 ENCOUNTER — HEALTH MAINTENANCE LETTER (OUTPATIENT)
Age: 44
End: 2024-10-12

## 2024-10-18 ENCOUNTER — ANESTHESIA EVENT (OUTPATIENT)
Dept: SURGERY | Facility: AMBULATORY SURGERY CENTER | Age: 44
End: 2024-10-18
Payer: COMMERCIAL

## 2024-10-21 ENCOUNTER — HOSPITAL ENCOUNTER (OUTPATIENT)
Facility: AMBULATORY SURGERY CENTER | Age: 44
Discharge: HOME | End: 2024-10-21
Attending: OTOLARYNGOLOGY
Payer: COMMERCIAL

## 2024-10-21 ENCOUNTER — ANESTHESIA (OUTPATIENT)
Dept: SURGERY | Facility: AMBULATORY SURGERY CENTER | Age: 44
End: 2024-10-21
Payer: COMMERCIAL

## 2024-10-21 VITALS
HEART RATE: 66 BPM | DIASTOLIC BLOOD PRESSURE: 74 MMHG | SYSTOLIC BLOOD PRESSURE: 117 MMHG | RESPIRATION RATE: 15 BRPM | OXYGEN SATURATION: 97 % | TEMPERATURE: 97.3 F

## 2024-10-21 DIAGNOSIS — K11.8 PAROTID MASS: Primary | ICD-10-CM

## 2024-10-21 LAB
HCG UR QL: NEGATIVE
INTERNAL QC OK POCT: NORMAL
POCT KIT EXPIRATION DATE: NORMAL
POCT KIT LOT NUMBER: NORMAL

## 2024-10-21 PROCEDURE — 42415 EXCISE PAROTID GLAND/LESION: CPT | Mod: RT | Performed by: OTOLARYNGOLOGY

## 2024-10-21 PROCEDURE — 42410 EXCISE PAROTID GLAND/LESION: CPT | Performed by: NURSE ANESTHETIST, CERTIFIED REGISTERED

## 2024-10-21 PROCEDURE — 42415 EXCISE PAROTID GLAND/LESION: CPT | Mod: RT

## 2024-10-21 PROCEDURE — 42410 EXCISE PAROTID GLAND/LESION: CPT | Performed by: ANESTHESIOLOGY

## 2024-10-21 PROCEDURE — 88307 TISSUE EXAM BY PATHOLOGIST: CPT | Mod: 26 | Performed by: STUDENT IN AN ORGANIZED HEALTH CARE EDUCATION/TRAINING PROGRAM

## 2024-10-21 PROCEDURE — 81025 URINE PREGNANCY TEST: CPT | Performed by: PATHOLOGY

## 2024-10-21 PROCEDURE — 88307 TISSUE EXAM BY PATHOLOGIST: CPT | Mod: TC | Performed by: OTOLARYNGOLOGY

## 2024-10-21 RX ORDER — FENTANYL CITRATE 50 UG/ML
50 INJECTION, SOLUTION INTRAMUSCULAR; INTRAVENOUS EVERY 5 MIN PRN
Status: DISCONTINUED | OUTPATIENT
Start: 2024-10-21 | End: 2024-10-21 | Stop reason: HOSPADM

## 2024-10-21 RX ORDER — ONDANSETRON 4 MG/1
4 TABLET, ORALLY DISINTEGRATING ORAL EVERY 8 HOURS PRN
Qty: 4 TABLET | Refills: 0 | Status: SHIPPED | OUTPATIENT
Start: 2024-10-21

## 2024-10-21 RX ORDER — MINERAL OIL/HYDROPHIL PETROLAT
OINTMENT (GRAM) TOPICAL 3 TIMES DAILY
Qty: 50 G | Refills: 2 | Status: SHIPPED | OUTPATIENT
Start: 2024-10-21

## 2024-10-21 RX ORDER — NALOXONE HYDROCHLORIDE 0.4 MG/ML
0.1 INJECTION, SOLUTION INTRAMUSCULAR; INTRAVENOUS; SUBCUTANEOUS
Status: DISCONTINUED | OUTPATIENT
Start: 2024-10-21 | End: 2024-10-22 | Stop reason: HOSPADM

## 2024-10-21 RX ORDER — HYDROMORPHONE HYDROCHLORIDE 1 MG/ML
0.4 INJECTION, SOLUTION INTRAMUSCULAR; INTRAVENOUS; SUBCUTANEOUS EVERY 5 MIN PRN
Status: DISCONTINUED | OUTPATIENT
Start: 2024-10-21 | End: 2024-10-21 | Stop reason: HOSPADM

## 2024-10-21 RX ORDER — DEXAMETHASONE SODIUM PHOSPHATE 10 MG/ML
4 INJECTION, SOLUTION INTRAMUSCULAR; INTRAVENOUS
Status: DISCONTINUED | OUTPATIENT
Start: 2024-10-21 | End: 2024-10-22 | Stop reason: HOSPADM

## 2024-10-21 RX ORDER — ONDANSETRON 4 MG/1
4 TABLET, ORALLY DISINTEGRATING ORAL EVERY 30 MIN PRN
Status: DISCONTINUED | OUTPATIENT
Start: 2024-10-21 | End: 2024-10-22 | Stop reason: HOSPADM

## 2024-10-21 RX ORDER — PROPOFOL 10 MG/ML
INJECTION, EMULSION INTRAVENOUS CONTINUOUS PRN
Status: DISCONTINUED | OUTPATIENT
Start: 2024-10-21 | End: 2024-10-21

## 2024-10-21 RX ORDER — AMOXICILLIN 250 MG
1-2 CAPSULE ORAL 2 TIMES DAILY
Qty: 30 TABLET | Refills: 0 | Status: SHIPPED | OUTPATIENT
Start: 2024-10-21

## 2024-10-21 RX ORDER — DEXAMETHASONE SODIUM PHOSPHATE 4 MG/ML
INJECTION, SOLUTION INTRA-ARTICULAR; INTRALESIONAL; INTRAMUSCULAR; INTRAVENOUS; SOFT TISSUE PRN
Status: DISCONTINUED | OUTPATIENT
Start: 2024-10-21 | End: 2024-10-21

## 2024-10-21 RX ORDER — ACETAMINOPHEN 325 MG/1
975 TABLET ORAL ONCE
Status: COMPLETED | OUTPATIENT
Start: 2024-10-21 | End: 2024-10-21

## 2024-10-21 RX ORDER — FENTANYL CITRATE 50 UG/ML
25 INJECTION, SOLUTION INTRAMUSCULAR; INTRAVENOUS EVERY 5 MIN PRN
Status: DISCONTINUED | OUTPATIENT
Start: 2024-10-21 | End: 2024-10-21 | Stop reason: HOSPADM

## 2024-10-21 RX ORDER — PROPOFOL 10 MG/ML
INJECTION, EMULSION INTRAVENOUS PRN
Status: DISCONTINUED | OUTPATIENT
Start: 2024-10-21 | End: 2024-10-21

## 2024-10-21 RX ORDER — DEXAMETHASONE SODIUM PHOSPHATE 10 MG/ML
4 INJECTION, SOLUTION INTRAMUSCULAR; INTRAVENOUS
Status: DISCONTINUED | OUTPATIENT
Start: 2024-10-21 | End: 2024-10-21 | Stop reason: HOSPADM

## 2024-10-21 RX ORDER — LIDOCAINE HYDROCHLORIDE AND EPINEPHRINE 10; 10 MG/ML; UG/ML
INJECTION, SOLUTION INFILTRATION; PERINEURAL PRN
Status: DISCONTINUED | OUTPATIENT
Start: 2024-10-21 | End: 2024-10-21 | Stop reason: HOSPADM

## 2024-10-21 RX ORDER — ONDANSETRON 4 MG/1
4 TABLET, ORALLY DISINTEGRATING ORAL EVERY 30 MIN PRN
Status: DISCONTINUED | OUTPATIENT
Start: 2024-10-21 | End: 2024-10-21 | Stop reason: HOSPADM

## 2024-10-21 RX ORDER — OXYCODONE HYDROCHLORIDE 5 MG/1
5 TABLET ORAL EVERY 6 HOURS PRN
Qty: 12 TABLET | Refills: 0 | Status: SHIPPED | OUTPATIENT
Start: 2024-10-21 | End: 2024-10-25

## 2024-10-21 RX ORDER — LIDOCAINE HYDROCHLORIDE 20 MG/ML
INJECTION, SOLUTION INFILTRATION; PERINEURAL PRN
Status: DISCONTINUED | OUTPATIENT
Start: 2024-10-21 | End: 2024-10-21

## 2024-10-21 RX ORDER — SODIUM CHLORIDE, SODIUM LACTATE, POTASSIUM CHLORIDE, CALCIUM CHLORIDE 600; 310; 30; 20 MG/100ML; MG/100ML; MG/100ML; MG/100ML
INJECTION, SOLUTION INTRAVENOUS CONTINUOUS
Status: DISCONTINUED | OUTPATIENT
Start: 2024-10-21 | End: 2024-10-21 | Stop reason: HOSPADM

## 2024-10-21 RX ORDER — GLYCOPYRROLATE 0.2 MG/ML
INJECTION, SOLUTION INTRAMUSCULAR; INTRAVENOUS PRN
Status: DISCONTINUED | OUTPATIENT
Start: 2024-10-21 | End: 2024-10-21

## 2024-10-21 RX ORDER — NALOXONE HYDROCHLORIDE 0.4 MG/ML
0.1 INJECTION, SOLUTION INTRAMUSCULAR; INTRAVENOUS; SUBCUTANEOUS
Status: DISCONTINUED | OUTPATIENT
Start: 2024-10-21 | End: 2024-10-21 | Stop reason: HOSPADM

## 2024-10-21 RX ORDER — ONDANSETRON 2 MG/ML
INJECTION INTRAMUSCULAR; INTRAVENOUS PRN
Status: DISCONTINUED | OUTPATIENT
Start: 2024-10-21 | End: 2024-10-21

## 2024-10-21 RX ORDER — HYDROMORPHONE HYDROCHLORIDE 1 MG/ML
0.2 INJECTION, SOLUTION INTRAMUSCULAR; INTRAVENOUS; SUBCUTANEOUS EVERY 5 MIN PRN
Status: DISCONTINUED | OUTPATIENT
Start: 2024-10-21 | End: 2024-10-21 | Stop reason: HOSPADM

## 2024-10-21 RX ORDER — LIDOCAINE 40 MG/G
CREAM TOPICAL
Status: DISCONTINUED | OUTPATIENT
Start: 2024-10-21 | End: 2024-10-21 | Stop reason: HOSPADM

## 2024-10-21 RX ORDER — ONDANSETRON 2 MG/ML
4 INJECTION INTRAMUSCULAR; INTRAVENOUS EVERY 30 MIN PRN
Status: DISCONTINUED | OUTPATIENT
Start: 2024-10-21 | End: 2024-10-22 | Stop reason: HOSPADM

## 2024-10-21 RX ORDER — OXYCODONE HYDROCHLORIDE 5 MG/1
10 TABLET ORAL
Status: DISCONTINUED | OUTPATIENT
Start: 2024-10-21 | End: 2024-10-22 | Stop reason: HOSPADM

## 2024-10-21 RX ORDER — ONDANSETRON 2 MG/ML
4 INJECTION INTRAMUSCULAR; INTRAVENOUS EVERY 30 MIN PRN
Status: DISCONTINUED | OUTPATIENT
Start: 2024-10-21 | End: 2024-10-21 | Stop reason: HOSPADM

## 2024-10-21 RX ORDER — OXYCODONE HYDROCHLORIDE 5 MG/1
5 TABLET ORAL
Status: COMPLETED | OUTPATIENT
Start: 2024-10-21 | End: 2024-10-21

## 2024-10-21 RX ORDER — FENTANYL CITRATE 50 UG/ML
INJECTION, SOLUTION INTRAMUSCULAR; INTRAVENOUS PRN
Status: DISCONTINUED | OUTPATIENT
Start: 2024-10-21 | End: 2024-10-21

## 2024-10-21 RX ADMIN — Medication 100 MCG: at 08:15

## 2024-10-21 RX ADMIN — Medication 50 MG: at 07:31

## 2024-10-21 RX ADMIN — FENTANYL CITRATE 100 MCG: 50 INJECTION, SOLUTION INTRAMUSCULAR; INTRAVENOUS at 07:25

## 2024-10-21 RX ADMIN — SODIUM CHLORIDE, SODIUM LACTATE, POTASSIUM CHLORIDE, CALCIUM CHLORIDE: 600; 310; 30; 20 INJECTION, SOLUTION INTRAVENOUS at 06:31

## 2024-10-21 RX ADMIN — LIDOCAINE HYDROCHLORIDE 60 MG: 20 INJECTION, SOLUTION INFILTRATION; PERINEURAL at 07:30

## 2024-10-21 RX ADMIN — Medication 100 MCG: at 08:06

## 2024-10-21 RX ADMIN — ONDANSETRON 4 MG: 2 INJECTION INTRAMUSCULAR; INTRAVENOUS at 07:46

## 2024-10-21 RX ADMIN — ACETAMINOPHEN 975 MG: 325 TABLET ORAL at 06:23

## 2024-10-21 RX ADMIN — FENTANYL CITRATE 25 MCG: 50 INJECTION, SOLUTION INTRAMUSCULAR; INTRAVENOUS at 10:16

## 2024-10-21 RX ADMIN — OXYCODONE HYDROCHLORIDE 5 MG: 5 TABLET ORAL at 10:46

## 2024-10-21 RX ADMIN — PROPOFOL 50 MG: 10 INJECTION, EMULSION INTRAVENOUS at 08:28

## 2024-10-21 RX ADMIN — PROPOFOL 150 MCG/KG/MIN: 10 INJECTION, EMULSION INTRAVENOUS at 07:28

## 2024-10-21 RX ADMIN — SODIUM CHLORIDE, SODIUM LACTATE, POTASSIUM CHLORIDE, CALCIUM CHLORIDE: 600; 310; 30; 20 INJECTION, SOLUTION INTRAVENOUS at 07:18

## 2024-10-21 RX ADMIN — PROPOFOL 150 MCG/KG/MIN: 10 INJECTION, EMULSION INTRAVENOUS at 07:30

## 2024-10-21 RX ADMIN — Medication 100 MCG: at 08:22

## 2024-10-21 RX ADMIN — PROPOFOL 200 MG: 10 INJECTION, EMULSION INTRAVENOUS at 07:30

## 2024-10-21 RX ADMIN — GLYCOPYRROLATE 0.2 MG: 0.2 INJECTION, SOLUTION INTRAMUSCULAR; INTRAVENOUS at 07:43

## 2024-10-21 RX ADMIN — GLYCOPYRROLATE 0.2 MG: 0.2 INJECTION, SOLUTION INTRAMUSCULAR; INTRAVENOUS at 07:55

## 2024-10-21 RX ADMIN — DEXAMETHASONE SODIUM PHOSPHATE 4 MG: 4 INJECTION, SOLUTION INTRA-ARTICULAR; INTRALESIONAL; INTRAMUSCULAR; INTRAVENOUS; SOFT TISSUE at 07:40

## 2024-10-21 ASSESSMENT — ENCOUNTER SYMPTOMS: ORTHOPNEA: 0

## 2024-10-21 ASSESSMENT — COPD QUESTIONNAIRES: COPD: 0

## 2024-10-21 ASSESSMENT — LIFESTYLE VARIABLES: TOBACCO_USE: 0

## 2024-10-21 NOTE — ANESTHESIA PREPROCEDURE EVALUATION
Anesthesia Pre-Procedure Evaluation    Patient: Enid Cisneros   MRN: [id number] : 1980        Procedure : Procedure(s):  Excision of Right Parotid Gland          Past Medical History:   Diagnosis Date    Closed fracture of unspecified part of humerus     fx of radius    Lyme disease     history of    PAC (premature atrial contraction) 2013    Holter    PVC (premature ventricular contraction) 2013    Holter      Past Surgical History:   Procedure Laterality Date    ------------OTHER-------------      Lipoma removal     SECTION N/A 2018    Procedure:  SECTION;  Primary  Section ;  Surgeon: Ana Rosa Arellano MD;  Location: [facility]    LAMINECT/DISCECTOMY, LUMBAR  2002    TONSILLECTOMY  2009      Allergies   Allergen Reactions    Clindamycin Rash      Social History     Tobacco Use    Smoking status: Never    Smokeless tobacco: Never   Substance Use Topics    Alcohol use: No      Wt Readings from Last 1 Encounters:   10/02/24 93.4 kg (206 lb)        Anesthesia Evaluation   Pt has had prior anesthetic. Type: Regional and General.    No history of anesthetic complications       ROS/MED HX  ENT/Pulmonary:    (-) tobacco use, asthma, COPD and recent URI   Neurologic:       Cardiovascular: Comment: PACs, PVCs on holter in . Study done for feeling palpitations. Denies syncope/presyncope   (-) CONTE, orthopnea/PND and syncope   METS/Exercise Tolerance: >4 METS    Hematologic:       Musculoskeletal:       GI/Hepatic:    (-) GERD   Renal/Genitourinary:       Endo:       Psychiatric/Substance Use:       Infectious Disease:       Malignancy:       Other:     (-) Any chance pregnant       Physical Exam    Airway        Mallampati: I   TM distance: > 3 FB   Neck ROM: full   Mouth opening: > 3 cm    Respiratory Devices and Support         Dental     Comment: No apparent abnormalities        Cardiovascular          Rhythm and rate: regular and normal     Pulmonary            "breath sounds clear to auscultation           OUTSIDE LABS:  CBC:   Lab Results   Component Value Date    WBC 7.3 03/19/2024    WBC 12.6 (H) 07/23/2018    HGB 13.1 03/19/2024    HGB 13.2 09/12/2018    HCT 41.3 03/19/2024    HCT 37.4 07/23/2018     03/19/2024     07/23/2018     BMP:   Lab Results   Component Value Date     03/19/2024     07/06/2018    POTASSIUM 4.1 03/19/2024    POTASSIUM 3.5 07/06/2018    CHLORIDE 105 03/19/2024    CHLORIDE 106 07/06/2018    CO2 25 03/19/2024    CO2 25 07/06/2018    BUN 9.7 03/19/2024    BUN 10 07/06/2018    CR 0.78 03/19/2024    CR 0.46 (L) 07/06/2018     (H) 03/19/2024     (H) 07/06/2018     COAGS: No results found for: \"PTT\", \"INR\", \"FIBR\"  POC:   Lab Results   Component Value Date     (H) 07/06/2018    HCG Negative 10/21/2024     HEPATIC:   Lab Results   Component Value Date    ALBUMIN 4.5 03/19/2024    PROTTOTAL 6.9 03/19/2024    ALT 15 03/19/2024    AST 23 03/19/2024    ALKPHOS 56 03/19/2024    BILITOTAL 0.7 03/19/2024     OTHER:   Lab Results   Component Value Date    PH 8.0 (A) 08/06/2015    MALATHI 9.5 03/19/2024    TSH 2.04 04/16/2014    SED 11 10/18/2006       Anesthesia Plan    ASA Status:  1    NPO Status:  NPO Appropriate    Anesthesia Type: General.     - Airway: ETT   Induction: Intravenous.   Maintenance: Balanced.        Consents    Anesthesia Plan(s) and associated risks, benefits, and realistic alternatives discussed. Questions answered and patient/representative(s) expressed understanding.     - Discussed:     - Discussed with:  Patient            Postoperative Care    Pain management: IV analgesics, Oral pain medications, Multi-modal analgesia.   PONV prophylaxis: Ondansetron (or other 5HT-3), Dexamethasone or Solumedrol, Background Propofol Infusion     Comments:    Other Comments: Discussed risks of general anesthesia, including aspiration pneumonia, sore throat/hoarse voice, abrasions/damage to lips/tongue/teeth, " "nausea, rare complications (including medication reactions, cardiac, pulmonary, hypoxia/low oxygen). Ensured understanding, invited questions and all questions were answered. Patient wishes to proceed.               Merle Farrar MD    I have reviewed the pertinent notes and labs in the chart from the past 30 days and (re)examined the patient.  Any updates or changes from those notes are reflected in this note.                       # Obesity: Estimated body mass index is 31.32 kg/m  as calculated from the following:    Height as of 10/2/24: 1.727 m (5' 8\").    Weight as of 10/2/24: 93.4 kg (206 lb).             "

## 2024-10-21 NOTE — ANESTHESIA PROCEDURE NOTES
Airway       Patient location during procedure: OR       Procedure Start/Stop Times: 10/21/2024 7:33 AM  Staff -        Anesthesiologist:  Merle Farrar MD       CRNA: Hayley Brink APRN CRNA       Performed By: CRNA  Consent for Airway        Urgency: elective  Indications and Patient Condition       Indications for airway management: tai-procedural       Induction type:intravenous       Mask difficulty assessment: 1 - vent by mask    Final Airway Details       Final airway type: endotracheal airway       Successful airway: ETT - single and Oral  Endotracheal Airway Details        ETT size (mm): 7.0       Cuffed: yes       Cuff volume (mL): 6       Successful intubation technique: direct laryngoscopy       DL Blade Type: Duggan 2       Grade View of Cords: 2       Adjucts: stylet       Position: Right       Measured from: lips       Secured at (cm): 22       Bite block used: None    Post intubation assessment        Placement verified by: capnometry, equal breath sounds and chest rise        Number of attempts at approach: 1       Number of other approaches attempted: 0       Secured with: tape       Ease of procedure: easy       Dentition: Intact and Unchanged    Medication(s) Administered   Medication Administration Time: 10/21/2024 7:33 AM

## 2024-10-21 NOTE — ANESTHESIA POSTPROCEDURE EVALUATION
Patient: Enid Cisneros    Procedure: Procedure(s):  Excision of Right Parotid Gland       Anesthesia Type:  General    Note:  Disposition: Outpatient   Postop Pain Control: Uneventful            Sign Out: Well controlled pain   PONV: No   Neuro/Psych: Uneventful            Sign Out: Acceptable/Baseline neuro status   Airway/Respiratory: Uneventful            Sign Out: Acceptable/Baseline resp. status   CV/Hemodynamics: Uneventful            Sign Out: Acceptable CV status; No obvious hypovolemia; No obvious fluid overload   Other NRE:    DID A NON-ROUTINE EVENT OCCUR?     Event details/Postop Comments:  Doing well. Alert, oriented. Mild sore throat. No nausea, or problems with pain control.  Patient denies any concerns.                Last vitals:  Vitals Value Taken Time   /70 10/21/24 1100   Temp 36.6  C (97.8  F) 10/21/24 1100   Pulse 70 10/21/24 1059   Resp 12 10/21/24 1100   SpO2 98 % 10/21/24 1100   Vitals shown include unfiled device data.    Electronically Signed By: Merle Farrar MD  October 21, 2024  12:10 PM

## 2024-10-21 NOTE — DISCHARGE INSTRUCTIONS
Tylenol 975 mg given at 6:23 AM.   Ok to take more at 12:23 PM.  Take 975mg-1000mg every 6 hours on a scheduled basis.    [facility]  Home Care Following Anesthesia  For 24 hours after surgery:  Get plenty of rest.  A responsible adult must stay with you for at least 24 hours after you leave the surgery center.  Do not drive or use heavy equipment.  If you have weakness or tingling, don't drive or use heavy equipment until this feeling goes away.   Do not drink alcohol.   Avoid strenuous or risky activities.  Ask for help when climbing stairs.  You may feel lightheaded.  IF so, sit for a few minutes before standing.  Have someone help you get up.   If you have nausea (feel sick to your stomach): Drink only clear liquids such as apple juice, ginger ale, broth or 7-Up.  Rest may also help.  Be sure to drink enough fluids.  Move to a regular diet as you feel able.   You may have a slight fever.  Call the doctor if your fever is over 100 F (37.7 C) (taken under the tongue) or lasts longer than 24 hours.  You may have a dry mouth, a sore throat, muscle aches or trouble sleeping. These should go away after 24 hours.  Do not make important or legal decisions.   It is recommended to avoid smoking.               Tips for taking pain medications  To get the best pain relief possible, remember these points:  Take pain medications as directed, before pain becomes severe.  Pain medication can upset your stomach: taking it with food may help.  Constipation is a common side effect of pain medication. Drink plenty of  fluids.  Eat foods high in fiber. Take a stool softener if recommended by your doctor or pharmacist.  Do not drink alcohol, drive or operate machinery while taking pain medications.  Ask about other ways to control pain, such as with heat, ice or relaxation.    Tylenol/Acetaminophen Consumption    If you feel your pain relief is insufficient, you may take Tylenol/Acetaminophen in  addition to your narcotic pain medication.   Be careful not to exceed 4,000 mg of Tylenol/Acetaminophen in a 24 hour period from all sources.  If you are taking extra strength Tylenol/acetaminophen (500 mg), the maximum dose is 8 tablets in 24 hours.  If you are taking regular strength acetaminophen (325 mg), the maximum dose is 12 tablets in 24 hours.    Call a doctor for any of the following:  Signs of infection (fever, growing tenderness at the surgery site, a large amount of drainage or bleeding, severe pain, foul-smelling drainage, redness, swelling).  It has been over 8 to 10 hours since surgery and you are still not able to urinate (pass water).  Headache for over 24 hours.  Numbness, tingling or weakness the day after surgery (if you had spinal anesthesia).  Signs of Covid-19 infection (temperature over 100 degrees, shortness of breath, cough, loss of taste/smell, generalized body aches, persistent headache, chills, sore throat, nausea/vomiting/diarrhea)  Your doctor is:       Dr. David Meza, ENT Otolaryngology: [phone]               Or dial [phone] and ask for the resident on call for:  ENT Otolaryngology  For emergency care, call the:  [place] [facility]:  [phone] (TTY for hearing impaired: [phone])

## 2024-10-21 NOTE — OP NOTE
Operative Report  October 21, 2024    Pre-operative diagnosis:         Parotid mass [K11.8]  Post-operative diagnosis        Same as pre-operative diagnosis     Procedure:      Excision of Right Parotid Gland, Right - Neck     Surgeon:              * David Meza MD - Primary     * Valentina Cartagena MD - Resident - Assisting  Anesthesia:     General             Estimated Blood Loss: Less than 10 ml     Drains: Penrose   Specimens:       ID Type Source Tests Collected by Time Destination   1 : right parotid tumor Tissue Parotid Gland, Right SURGICAL PATHOLOGY EXAM David Meza MD 10/21/2024  9:49 AM        Findings:         2+cm well-encapsulated mass in tail of parotid. Retrograde dissection for identification of facial nerve.  Complications:            None.  Implants:         * No implants in log *    Indications:  This patient is  43yo with a right neck mass that was determined to be within the parotid gland on imaging and physical exam. An FNA demonstrated pleomorphic adenoma and the above stated procedure was discussed at length with the patient, as well as the alternatives, with the risks and benefits of each. After consideration and understanding they consented to proceeding.       Description of Procedure:  The patient was brought into the operating room, placed on the operating table in supine position.The patient was then placed under general anesthesia and airway maintained via oral endotracheal tube. A bed was turned 90 degrees and timeout was then taken per institution standards to confirm correct patient identity and planned procedure. Facial nerve electrodes were placed in 4 locations on the right-side of the face and confirmed functioning with the tap test. The operative area was then injected with 1% lidocaine and epinephrine 1:100,000 and the patient was prepped and draped in the usual sterile fashion.     We began with a modified Tree incision, which was carried down to the  parotid fascia superiorly and then through the platysmal within the cervical portion of the incision. The flap was then elevated medially just above the parotid fascia until we were at least 2cm anterior to the mass. Next we located the greater auricular nerve and traced this superiorly towards the lobule. Once skeletonized we bluntly dissected along the anterior border of the SCM to identify the posterior belly of digastric. Once this was clearly visualized we returned to the superior portion of the incision and dissected medially along the tragal cartilage until we reached the tragal pointer. We then gently dissected inferior to the tragal pointer in an attempt to locate the main trunk of the facial nerve. After dissecting in the anticipated location and identifying the tympanomastoid suture line we were unable to identify the facial nerve and transitioned to retrograde dissection. A cervical branch was located just superficial to the well-encapsulated mass and was gently dissected free from the mass and fascial attachments. This was traced proximally which brought us to the lower division which was located just superior to the mass. We then sharply divided the parotid tissue off the nerve and circumferentially around the specimen, taking care to leave a cuff of normal appearing parotid tissue. Once the mass was completely  it was passed off for permanent pathology. The facial nerve was skeletonized back to the main trunk, and tehn stimulated both at the distal branch and main trunk at 0.5mA. The wound bed was irrigated with sterile saline and hemostasis achieved with bipolar cautery, and surgicel placed in the parotid defect. The remaining parotid fascia was tacked to the SCM fascia and the subcutaneous tissues closed with 3-0 vicryl. A penrose was tracked into the wound bed and secured to the skin, which was closed with a running 4-0 nylon. This concluded our procedure. The patient was then turned back  to the anesthesia team for uneventful wake-up and later brought to PACU. Her right facial nerve showed activation of all branches in PACU.     Dr. Meza was present and participatory for the entirety of the procedure     Darlyn Cartagena MD         Date of Service was 10-

## 2024-10-21 NOTE — BRIEF OP NOTE
[facility]    Brief Operative Note    Pre-operative diagnosis: Parotid mass [K11.8]  Post-operative diagnosis Same as pre-operative diagnosis    Procedure: Excision of Right Parotid Gland, Right - Neck    Surgeon: Surgeons and Role:     * David Meza MD - Primary     * Valentina Cartagena MD - Resident - Assisting  Anesthesia: General   Estimated Blood Loss: Less than 10 ml    Drains: Penrose   Specimens:   ID Type Source Tests Collected by Time Destination   1 : right parotid tumor Tissue Parotid Gland, Right SURGICAL PATHOLOGY EXAM David Meza MD 10/21/2024  9:49 AM      Findings:  2+cm well-encapsulated mass in tail of parotid. Retrograde dissection for identification of facial nerve.  Complications: None.  Implants: * No implants in log *

## 2024-10-21 NOTE — ANESTHESIA CARE TRANSFER NOTE
Patient: Enid Cisneros    Procedure: Procedure(s):  Excision of Right Parotid Gland       Diagnosis: Parotid mass [K11.8]  Diagnosis Additional Information: No value filed.    Anesthesia Type:   No value filed.     Note:    Oropharynx: oropharynx clear of all foreign objects  Level of Consciousness: awake  Oxygen Supplementation: face mask    Independent Airway: airway patency satisfactory and stable  Dentition: dentition unchanged  Vital Signs Stable: post-procedure vital signs reviewed and stable  Report to RN Given: handoff report given  Patient transferred to: PACU    Handoff Report: Identifed the Patient, Identified the Reponsible Provider, Reviewed the pertinent medical history, Discussed the surgical course, Reviewed Intra-OP anesthesia mangement and issues during anesthesia, Set expectations for post-procedure period and Allowed opportunity for questions and acknowledgement of understanding      Vitals:  Vitals Value Taken Time   BP     Temp     Pulse     Resp     SpO2         Electronically Signed By: ARIK Gordon CRNA  October 21, 2024  10:36 AM

## 2024-10-23 NOTE — PROGRESS NOTES
Please Note: I was the primary surgeon with DR Cartagena, the chief resident. I performed the procedure on 10-, this is the correct date of service.     David Meza MD

## 2024-10-28 LAB
PATH REPORT.COMMENTS IMP SPEC: NORMAL
PATH REPORT.COMMENTS IMP SPEC: NORMAL
PATH REPORT.FINAL DX SPEC: NORMAL
PATH REPORT.GROSS SPEC: NORMAL
PATH REPORT.MICROSCOPIC SPEC OTHER STN: NORMAL
PATH REPORT.RELEVANT HX SPEC: NORMAL
PHOTO IMAGE: NORMAL

## 2024-10-29 ENCOUNTER — OFFICE VISIT (OUTPATIENT)
Dept: OTOLARYNGOLOGY | Facility: CLINIC | Age: 44
End: 2024-10-29
Payer: COMMERCIAL

## 2024-10-29 ENCOUNTER — TELEPHONE (OUTPATIENT)
Dept: OTOLARYNGOLOGY | Facility: CLINIC | Age: 44
End: 2024-10-29

## 2024-10-29 VITALS — HEIGHT: 68 IN | BODY MASS INDEX: 31.32 KG/M2

## 2024-10-29 DIAGNOSIS — K11.8 PAROTID MASS: Primary | ICD-10-CM

## 2024-10-29 PROCEDURE — 99024 POSTOP FOLLOW-UP VISIT: CPT | Performed by: OTOLARYNGOLOGY

## 2024-10-29 ASSESSMENT — PAIN SCALES - GENERAL: PAINLEVEL_OUTOF10: MILD PAIN (3)

## 2024-10-29 NOTE — LETTER
10/29/2024       RE: Enid Cisneros  [address]  [facility] [address]     Dear Colleague,    Thank you for referring your patient, Enid Cisneros, to the [facility] [place] at [facility]. Please see a copy of my visit note below.      Otolaryngology Clinic    Name: Enid Cisneros  MRN: [id number]  Age: 44 year old  : 1980  10/29/2024      Chief Complaint:   Follow up     History of Present Illness:   Enid Cisneros is a 44 year old female with a history of parotid mass who presents for follow up.     Review of Systems:   Pertinent items are noted in HPI or as in patient entered ROS below, remainder of complete ROS is negative.       2024     9:17 AM    ENT ROS   Neurology Headache   Ears, Nose, Throat Ear pain        Physical Exam:   LMP 10/14/2024      PHYSICAL EXAMINATION:    Constitutional:  The patient was unaccompanied, well-groomed, and in no acute distress.    Skin:  Warm and pink.    Neurologic:  Alert and oriented x 3.  CN's III-XII within normal limits.  Voice normal.   Psychiatric:  The patient's affect was calm, cooperative, and appropriate.    Respiratory:  Breathing comfortably without stridor or exertion of accessory muscles.    Eyes: Extraocular movement intact.    Head:  Normocephalic and atraumatic.  No lesions or scars.    Ears:  Pinnae and tragus non-tender.  EAC's and TM's were clear.    Nose:  Sinuses were non-tender.  Anterior rhinoscopy revealed midline septum and absence of purulence or polyps.    OC/OP:  Normal tongue, floor of mouth, buccal mucosa, and palate.  No lesions or masses on inspection or palpation.  No abnormal lymph tissue in the oropharynx.  The pterygoid region is non-tender.    Neck:  Supple with normal laryngeal and tracheal landmarks.  The parotid beds were without masses.  Fresh surgeory t no hematoma No palpable thyroid.  Lymphatic:  There is no palpable  lymphadenopathy in the neck.      Assessment and Plan:  No diagnosis found.  S/p parotid     Follow-up: No follow-ups on file.         Scribe Disclosure:  I, Sugar Dia, am serving as a scribe to document services personally performed by David Meza MD at this visit, based upon the provider's statements to me. All documentation has been reviewed by the aforementioned provider prior to being entered into the official medical record.          Again, thank you for allowing me to participate in the care of your patient.      Sincerely,    David Meza MD

## 2024-10-29 NOTE — PROGRESS NOTES
Otolaryngology Clinic    Name: Enid Cisneros  MRN: [id number]  Age: 44 year old  : 1980  10/29/2024      Chief Complaint:   Follow up     History of Present Illness:   Enid Cisneros is a 44 year old female with a history of parotid mass who presents for follow up.     Review of Systems:   Pertinent items are noted in HPI or as in patient entered ROS below, remainder of complete ROS is negative.       2024     9:17 AM    ENT ROS   Neurology Headache   Ears, Nose, Throat Ear pain        Physical Exam:   [facility] 10/14/2024      PHYSICAL EXAMINATION:    Constitutional:  The patient was unaccompanied, well-groomed, and in no acute distress.    Skin:  Warm and pink.    Neurologic:  Alert and oriented x 3.  CN's III-XII within normal limits.  Voice normal.   Psychiatric:  The patient's affect was calm, cooperative, and appropriate.    Respiratory:  Breathing comfortably without stridor or exertion of accessory muscles.    Eyes: Extraocular movement intact.    Head:  Normocephalic and atraumatic.  No lesions or scars.    Ears:  Pinnae and tragus non-tender.  EAC's and TM's were clear.    Nose:  Sinuses were non-tender.  Anterior rhinoscopy revealed midline septum and absence of purulence or polyps.    OC/OP:  Normal tongue, floor of mouth, buccal mucosa, and palate.  No lesions or masses on inspection or palpation.  No abnormal lymph tissue in the oropharynx.  The pterygoid region is non-tender.    Neck:  Supple with normal laryngeal and tracheal landmarks.  The parotid beds were without masses.  Fresh surgeory t no hematoma No palpable thyroid.  Lymphatic:  There is no palpable lymphadenopathy in the neck.      Assessment and Plan:  No diagnosis found.  S/p parotid     Follow-up: No follow-ups on file.         Scribe Disclosure:  Sugar CHRISTINE, am serving as a scribe to document services personally performed by David Meza MD at this visit, based upon the provider's statements to me. All  documentation has been reviewed by the aforementioned provider prior to being entered into the official medical record.       Yes/Occasional

## 2024-10-29 NOTE — TELEPHONE ENCOUNTER
LVM for patient regarding scheduling a Follow up 6 weeks . Provided ENT Clinic number for scheduling options.

## 2024-10-30 ENCOUNTER — TELEPHONE (OUTPATIENT)
Dept: OTOLARYNGOLOGY | Facility: CLINIC | Age: 44
End: 2024-10-30
Payer: COMMERCIAL

## 2024-10-30 NOTE — TELEPHONE ENCOUNTER
Left Voicemail (1st Attempt) for the patient to call back and schedule the following:    Appointment type: return ent  Provider: Derrick  Return date: around December 7, 2024  Specialty phone number: writer's direct  Additional appointment(s) needed: n/a  Additional Notes: 6 wk follow up/post op    Sylvia Willis on 10/30/2024 at 12:55 PM

## 2024-12-10 ENCOUNTER — OFFICE VISIT (OUTPATIENT)
Dept: OTOLARYNGOLOGY | Facility: CLINIC | Age: 44
End: 2024-12-10
Payer: COMMERCIAL

## 2024-12-10 VITALS
HEART RATE: 79 BPM | WEIGHT: 214.8 LBS | BODY MASS INDEX: 32.55 KG/M2 | OXYGEN SATURATION: 98 % | SYSTOLIC BLOOD PRESSURE: 128 MMHG | HEIGHT: 68 IN | DIASTOLIC BLOOD PRESSURE: 76 MMHG

## 2024-12-10 DIAGNOSIS — K11.8 PAROTID MASS: Primary | ICD-10-CM

## 2024-12-10 PROCEDURE — 99024 POSTOP FOLLOW-UP VISIT: CPT | Performed by: OTOLARYNGOLOGY

## 2024-12-10 ASSESSMENT — PAIN SCALES - GENERAL: PAINLEVEL_OUTOF10: NO PAIN (0)

## 2024-12-10 NOTE — LETTER
"12/10/2024       RE: Enid Cisneros  [address]  [place] [address]     Dear Colleague,    Thank you for referring your patient, Enid Cisneros, to the [facility] NOSE [facility] THROAT CLINIC [place] at [facility]. Please see a copy of my visit note below.      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 She reports numbness is the same as her last visit on 10/29/24. She is planning on going to [place] for [place] to visit family.    10/29/24 note is unsigned     Review of Systems:   Pertinent items are noted in HPI or as in patient entered ROS below, remainder of complete ROS is negative.       2024     9:17 AM    ENT ROS   Neurology Headache   Ears, Nose, Throat Ear pain        Physical Exam:   /76 (BP Location: Left arm, Patient Position: Sitting, Cuff Size: Adult Regular)   Pulse 79   Ht 1.727 m (5' 8\")   Wt 97.4 kg (214 lb 12.8 oz)   LMP 10/14/2024   SpO2 98%   BMI 32.66 kg/m       PHYSICAL EXAMINATION:    Constitutional:  The patient was unaccompanied, well-groomed, and in no acute distress.    Skin:  Warm and pink.    Neurologic:  Alert and oriented x 3.  CNs III-XII within normal limits.  Voice normal.  Facial nerves are completely intact after surgery.  Psychiatric:  The patient's affect was calm, cooperative, and appropriate.    Respiratory:  Breathing comfortably without stridor or exertion of accessory muscles.    Eyes: Extraocular movement intact.    Head:  Normocephalic and atraumatic.  No lesions or scars.    Ears:  Pinnae and tragus non-tender.  EACs and TMs were clear.    Nose:  Sinuses were non-tender.  Anterior " rhinoscopy revealed midline septum and absence of purulence or polyps.    OC/OP:   Normal tongue, floor of mouth, buccal mucosa, and palate.  No lesions or masses on inspection or palpation.  No abnormal lymph tissue in the oropharynx.  The pterygoid region is non-tender.    Neck:  Supple with normal laryngeal and tracheal landmarks.  Right parotid gland has a healing scar iwth hyperemia only in the scar line.  The parotid beds were without masses.  No palpable thyroid.  Lymphatic:  There is no palpable lymphadenopathy in the neck.      Assessment and Plan:  No diagnosis found.  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 She is healing as expected, with no concerning findings on exam and a symmetrical appearance. Recommend she not apply topical antibiotics, as that could cause skin reactivity. For her upcoming trip to [place], to protect the area in enrique environment, recommend she apply SPF 50 lip balm to the area.    Follow-up: in clinic in 3-6 months for monitoring if recurrence        Scribe Disclosure:  I, ENID MARCELINO, am serving as a scribe to document services personally performed by David Meza MD at this visit, based upon the provider's statements to me. All documentation has been reviewed by the aforementioned provider prior to being entered into the official medical record.       Again, thank you for allowing me to participate in the care of your patient.      Sincerely,    David Meza MD

## 2024-12-10 NOTE — PROGRESS NOTES
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 She reports numbness is the same as her last visit on 10/29/24. She is planning on going to [place] for [place] to visit family.    10/29/24 note is unsigned     Review of Systems:   Pertinent items are noted in HPI or as in patient entered ROS below, remainder of complete ROS is negative.       2024     9:17 AM    ENT ROS   Neurology Headache   Ears, Nose, Throat Ear pain        Physical Exam:   /76 (BP Location: Left arm, Patient Position: Sitting, Cuff Size: Adult Regular)   Pulse 79   Ht 1.727 m (5' 8\")   Wt 97.4 kg (214 lb 12.8 oz)   LMP 10/14/2024   SpO2 98%   BMI 32.66 kg/m       PHYSICAL EXAMINATION:    Constitutional:  The patient was unaccompanied, well-groomed, and in no acute distress.    Skin:  Warm and pink.    Neurologic:  Alert and oriented x 3.  CNs III-XII within normal limits.  Voice normal.  Facial nerves are completely intact after surgery.  Psychiatric:  The patient's affect was calm, cooperative, and appropriate.    Respiratory:  Breathing comfortably without stridor or exertion of accessory muscles.    Eyes: Extraocular movement intact.    Head:  Normocephalic and atraumatic.  No lesions or scars.    Ears:  Pinnae and tragus non-tender.  EACs and TMs were clear.    Nose:  Sinuses were non-tender.  Anterior rhinoscopy revealed midline septum and absence of purulence or polyps.    OC/OP:   Normal tongue, floor of mouth, buccal mucosa, and palate.  No lesions or masses on inspection or palpation.  No abnormal lymph tissue in the oropharynx.  The pterygoid region is non-tender.    Neck:  Supple with normal laryngeal and tracheal landmarks.  " Right parotid gland has a healing scar iwth hyperemia only in the scar line.  The parotid beds were without masses.  No palpable thyroid.  Lymphatic:  There is no palpable lymphadenopathy in the neck.      Assessment and Plan:  No diagnosis found.  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 She is healing as expected, with no concerning findings on exam and a symmetrical appearance. Recommend she not apply topical antibiotics, as that could cause skin reactivity. For her upcoming trip to [place], to protect the area in enrique environment, recommend she apply SPF 50 lip balm to the area.    Follow-up: in clinic in 3-6 months for monitoring if recurrence        Scribe Disclosure:  I, ENID BENSON, am serving as a scribe to document services personally performed by David Meza MD at this visit, based upon the provider's statements to me. All documentation has been reviewed by the aforementioned provider prior to being entered into the official medical record.

## 2024-12-16 NOTE — PROVIDER NOTIFICATION
07/11/18 2031   Provider Notification   Provider Name/Title Dr. Monroy    Method of Notification Electronic Page   Notification Reason Uterine Activity     Provider notified pt having ctx on monitor, pt had space of 20 minutes between ctx, per provider pt okay to come off monitor given pt not feeling ctx.    Neurology follow up note    DEEPTHI [id number]      Interval History:    Patient feels ok no new complaints.    Allergies    sulfa drugs (Rash)  penicillins (Rash)    Intolerances        MEDICATIONS    acetaminophen     Tablet .. 650 milliGRAM(s) Oral every 6 hours PRN  amLODIPine   Tablet 5 milliGRAM(s) Oral daily  aspirin enteric coated 81 milliGRAM(s) Oral daily  atorvastatin 40 milliGRAM(s) Oral at bedtime  escitalopram 5 milliGRAM(s) Oral daily  heparin   Injectable 5000 Unit(s) SubCutaneous every 8 hours  lamoTRIgine 225 milliGRAM(s) Oral two times a day  lithium 300 milliGRAM(s) Oral daily  metoprolol succinate ER 25 milliGRAM(s) Oral at bedtime  SUMAtriptan 100 milliGRAM(s) Oral every 6 hours PRN  topiramate 100 milliGRAM(s) Oral two times a day              Vital Signs Last 24 Hrs  T(C): 36.8 (16 Dec 2024 07:42), Max: 38.1 (15 Dec 2024 14:11)  T(F): 98.3 (16 Dec 2024 07:42), Max: 100.5 (15 Dec 2024 14:11)  HR: 88 (16 Dec 2024 07:42) (63 - 104)  BP: 149/60 (16 Dec 2024 07:42) (149/60 - 185/73)  BP(mean): --  RR: 18 (16 Dec 2024 07:42) (18 - 20)  SpO2: 97% (16 Dec 2024 07:42) (97% - 98%)    Parameters below as of 16 Dec 2024 04:15  Patient On (Oxygen Delivery Method): room air      REVIEW OF SYSTEMS:  CONSTITUTIONAL:  The patient denies fever, chills, night sweats.  HEAD:  Occasional headaches.  EYES:  No double vision.  EARS:  No ringing in the ears.  NECK:  No neck pain.  CARDIOVASCULAR:  No chest pain.  RESPIRATORY:  No shortness of breath.  ABDOMEN:  No nausea, vomiting, or abdominal pain.  EXTREMITIES/NEUROLOGICAL:  No numbness or tingling.  MUSCULOSKELETAL:  Occasional joint pain.    PHYSICAL EXAMINATION:  HEAD:  Normocephalic, atraumatic.  EYES:  No scleral icterus.  EARS:  Hearing appeared to be intact.  NECK:  Supple.  CARDIOVASCULAR:  S1, S2 heard.  RESPIRATORY:  Air entry bilaterally.  ABDOMEN:  Soft, nontender.  EXTREMITIES:  No clubbing or cyanosis was noted.    NEUROLOGIC:  The patient is awake, alert.  Was able to say her correct age and month.  Upon conversion with the patient, at times, she did repeat the same sentences over and over.  Was able to name simple objects.  Was able to make out number of fingers in each eye.  Speech was fluent.  Smile symmetric.  Motor, bilateral upper and lower extremities 4/5.  Sensory bilateral upper and lower extremities intact to light touch.  No drift of either upper or lower extremities.      LABS:  CBC Full  -  ( 15 Dec 2024 06:00 )  WBC Count : 11.06 K/uL  RBC Count : 4.00 M/uL  Hemoglobin : 11.9 g/dL  Hematocrit : 36.9 %  Platelet Count - Automated : 254 K/uL  Mean Cell Volume : 92.3 fL  Mean Cell Hemoglobin : 29.8 pg  Mean Cell Hemoglobin Concentration : 32.2 g/dL  Auto Neutrophil # : 8.05 K/uL  Auto Lymphocyte # : 1.82 K/uL  Auto Monocyte # : 0.90 K/uL  Auto Eosinophil # : 0.14 K/uL  Auto Basophil # : 0.10 K/uL  Auto Neutrophil % : 72.7 %  Auto Lymphocyte % : 16.5 %  Auto Monocyte % : 8.1 %  Auto Eosinophil % : 1.3 %  Auto Basophil % : 0.9 %    Urinalysis Basic - ( 15 Dec 2024 06:00 )    Color: x / Appearance: x / SG: x / pH: x  Gluc: 118 mg/dL / Ketone: x  / Bili: x / Urobili: x   Blood: x / Protein: x / Nitrite: x   Leuk Esterase: x / RBC: x / WBC x   Sq Epi: x / Non Sq Epi: x / Bacteria: x      12-15    141  |  108  |  18  ----------------------------<  118[H]  3.7   |  18[L]  |  1.20    Ca    10.0      15 Dec 2024 06:00  Mg     2.1     12-15    TPro  6.7  /  Alb  3.8  /  TBili  0.8  /  DBili  x   /  AST  14  /  ALT  13  /  AlkPhos  84  12-15    Hemoglobin A1C:     LIVER FUNCTIONS - ( 15 Dec 2024 06:00 )  Alb: 3.8 g/dL / Pro: 6.7 g/dL / ALK PHOS: 84 U/L / ALT: 13 U/L / AST: 14 U/L / GGT: x           Vitamin B12         RADIOLOGY  ANALYSIS AND PLAN:  This is a 70-year-old with altered mental status.    .For altered mental status, at present unclear etiology.  Questionable metabolic encephalopathy.  Question was could be a complex migraine, questionable any subclinical seizure events, questionable exacerbation of underlying psychiatric disorders.  Examination is limited to evaluate for cerebrovascular accident.  No motor deficit, but does repeat things over.  .For history of migraines, continue home medication.  .For hyperlipidemia, continue statin.  -For history of seizures, continue home epilepsy medications   For history of hypertension, monitor systolic blood pressure.  A. continue patient on her aspirin.  .For history of anxiety and depression, Psychiatry followup.  will plan for mri mra head will attempt EEG  back in feb had similar event had mri and EEG     Spoke with father, Brody, at [phone]. 12/16 He understands the above process.    52 minutes of time spent with the patient; plan of care, reviewing data, speaking to multidisciplinary care team with greater than 50% time in counseling, care coordination.    Thank you for courtesy of consultation.

## 2025-01-09 ENCOUNTER — LAB REQUISITION (OUTPATIENT)
Dept: LAB | Facility: CLINIC | Age: 45
End: 2025-01-09
Payer: COMMERCIAL

## 2025-01-09 DIAGNOSIS — R53.83 OTHER FATIGUE: ICD-10-CM

## 2025-01-09 DIAGNOSIS — N92.1 EXCESSIVE AND FREQUENT MENSTRUATION WITH IRREGULAR CYCLE: ICD-10-CM

## 2025-01-09 LAB
ERYTHROCYTE [DISTWIDTH] IN BLOOD BY AUTOMATED COUNT: 12.8 % (ref 10–15)
HCT VFR BLD AUTO: 44.4 % (ref 35–47)
HGB BLD-MCNC: 13.9 G/DL (ref 11.7–15.7)
MCH RBC QN AUTO: 29 PG (ref 26.5–33)
MCHC RBC AUTO-ENTMCNC: 31.3 G/DL (ref 31.5–36.5)
MCV RBC AUTO: 93 FL (ref 78–100)
PLATELET # BLD AUTO: 279 10E3/UL (ref 150–450)
RBC # BLD AUTO: 4.8 10E6/UL (ref 3.8–5.2)
TSH SERPL DL<=0.005 MIU/L-ACNC: 1.81 UIU/ML (ref 0.3–4.2)
VIT D+METAB SERPL-MCNC: 32 NG/ML (ref 20–50)
WBC # BLD AUTO: 7 10E3/UL (ref 4–11)

## 2025-01-09 PROCEDURE — 85018 HEMOGLOBIN: CPT | Performed by: PHYSICIAN ASSISTANT

## 2025-01-09 PROCEDURE — 84443 ASSAY THYROID STIM HORMONE: CPT | Performed by: PHYSICIAN ASSISTANT

## 2025-01-09 PROCEDURE — 85041 AUTOMATED RBC COUNT: CPT | Performed by: PHYSICIAN ASSISTANT

## 2025-01-09 PROCEDURE — 82306 VITAMIN D 25 HYDROXY: CPT | Performed by: PHYSICIAN ASSISTANT

## 2025-03-26 ENCOUNTER — ANCILLARY PROCEDURE (OUTPATIENT)
Dept: MAMMOGRAPHY | Facility: CLINIC | Age: 45
End: 2025-03-26
Payer: COMMERCIAL

## 2025-03-26 DIAGNOSIS — Z12.31 VISIT FOR SCREENING MAMMOGRAM: ICD-10-CM

## 2025-03-26 PROCEDURE — 77063 BREAST TOMOSYNTHESIS BI: CPT | Mod: GC | Performed by: RADIOLOGY

## 2025-03-26 PROCEDURE — 77067 SCR MAMMO BI INCL CAD: CPT | Mod: GC | Performed by: RADIOLOGY

## 2025-03-27 ENCOUNTER — ANCILLARY ORDERS (OUTPATIENT)
Dept: MAMMOGRAPHY | Facility: CLINIC | Age: 45
End: 2025-03-27
Payer: COMMERCIAL

## 2025-03-27 DIAGNOSIS — R92.8 ABNORMAL MAMMOGRAM: Primary | ICD-10-CM

## 2025-04-01 ENCOUNTER — OFFICE VISIT (OUTPATIENT)
Dept: OTOLARYNGOLOGY | Facility: CLINIC | Age: 45
End: 2025-04-01
Payer: COMMERCIAL

## 2025-04-01 ENCOUNTER — TELEPHONE (OUTPATIENT)
Dept: FAMILY MEDICINE | Facility: CLINIC | Age: 45
End: 2025-04-01

## 2025-04-01 VITALS
DIASTOLIC BLOOD PRESSURE: 67 MMHG | WEIGHT: 201.4 LBS | SYSTOLIC BLOOD PRESSURE: 110 MMHG | HEIGHT: 68 IN | HEART RATE: 81 BPM | OXYGEN SATURATION: 98 % | BODY MASS INDEX: 30.52 KG/M2

## 2025-04-01 DIAGNOSIS — K11.8 PAROTID MASS: Primary | ICD-10-CM

## 2025-04-01 ASSESSMENT — PAIN SCALES - GENERAL: PAINLEVEL_OUTOF10: NO PAIN (0)

## 2025-04-01 NOTE — PATIENT INSTRUCTIONS
You were seen in the ENT Clinic today by Dr. Meza. If you have any questions or concerns after your appointment, please contact us (see below)       2.   Plan to return to the ENT clinic in 6 months           How to Contact Us:  Send a [employer] message to your provider. Our team will respond to you via [employer]. Occasionally, we will need to call you to get further information.  For urgent matters (Monday-Friday), call the ENT Clinic: [phone] and speak with a call center team member - they will route your call appropriately.   If you'd like to speak directly with a nurse, please find our contact information below. We do our best to check voicemail frequently throughout the day, and will work to call you back within 1-2 days. For urgent matters, please use the general clinic phone numbers listed above.     CRISTINE Gray  Direct: [phone]  Ana FAITH LPN  Direct: [phone]         [facility]  Department of Otolaryngology

## 2025-04-01 NOTE — LETTER
April 9, 2025      Enid Cisneros  [address]  [place] [address]          Dear Enid,       Your team at [facility] cares about your health. We have reviewed your chart and based on our findings; we are making the following recommendations to better manage your health.     You are in particular need of attention regarding the following:     Call or MyChart message your clinic to schedule a colonoscopy, schedule/ a FIT Test, or order a Cologuard test. If you are unsure what type of test you need, please call your clinic and speak to clinic staff.   Colon cancer is now the second leading cause of cancer-related deaths in the [place] [facility] [place] for both men and women and there are over 130,000 new cases and 50,000 deaths per year from colon cancer. Colonoscopies can prevent 90-95% of these deaths. Problem lesions can be removed before they ever become cancer. This test is not only looking for cancer, but also getting rid of precancerous lesions.   If you are under/uninsured, we recommend you contact the [employer] Program.[employer] is a free colorectal cancer screening program that provides colonoscopies for eligible under/uninsured [place] men and women. If you are interested in receiving a free colonoscopy, please call [employer] at t [phone] (mention code [employer]) to see if you're eligible. Please have them send us the results.   PREVENTATIVE VISIT: Physical    If you have already completed these items, please contact the clinic via phone or   [employer] so your care team can review and update your records. Thank you for   choosing [facility] for your healthcare needs. For any questions,   concerns, or to schedule an appointment please contact our clinic.    Healthy Regards,      Your [facility] Care Team

## 2025-04-01 NOTE — TELEPHONE ENCOUNTER
Patient Quality Outreach    Patient is due for the following:   Colon Cancer Screening  IVD  -  LDL (Fasting)  Physical Preventive Adult Physical      Topic Date Due    Hepatitis B Vaccine (1 of 3 - 19+ 3-dose series) Never done    Flu Vaccine (1) 09/01/2024    COVID-19 Vaccine (1 - 2024-25 season) Never done       Action(s) Taken:   Schedule a Adult Preventative    Type of outreach:    Sent [employer] message.    Questions for provider review:    None         Massiel Gomez, VF  Chart routed to None.

## 2025-04-01 NOTE — LETTER
4/1/2025       RE: Enid Cisneros  [address]  [place] [address]     Dear Colleague,    Thank you for referring your patient, Enid Cisneros, to the [facility] [place] [facility]. Please see a copy of my visit note below.    Patient was seen for follow-up of right parotidectomy.  She is doing well at the present time today.  No other current complaints.    On physical examination today patient is alert and orient x 3 and pleasant.  Skin the face of his neck on her is normal but there is still some redness to the incision with a parotidectomy that was done on the right side.  No other abnormalities are otherwise noted oral cavity oropharynx is clear neck exam shows no mass or adenopathy.    Assessment and plan patient status post parotidectomy she had a pleomorphic adenoma she is doing well now we plan to see her again in approximately 6 months      Again, thank you for allowing me to participate in the care of your patient.      Sincerely,    David Meza MD

## 2025-04-01 NOTE — PROGRESS NOTES
Patient was seen for follow-up of right parotidectomy.  She is doing well at the present time today.  No other current complaints.    On physical examination today patient is alert and orient x 3 and pleasant.  Skin the face of his neck on her is normal but there is still some redness to the incision with a parotidectomy that was done on the right side.  No other abnormalities are otherwise noted oral cavity oropharynx is clear neck exam shows no mass or adenopathy.    Assessment and plan patient status post parotidectomy she had a pleomorphic adenoma she is doing well now we plan to see her again in approximately 6 months

## 2025-04-02 ENCOUNTER — ANCILLARY PROCEDURE (OUTPATIENT)
Dept: MAMMOGRAPHY | Facility: CLINIC | Age: 45
End: 2025-04-02
Attending: FAMILY MEDICINE
Payer: COMMERCIAL

## 2025-04-02 DIAGNOSIS — R92.8 ABNORMAL MAMMOGRAM: ICD-10-CM

## 2025-04-02 PROCEDURE — G0279 TOMOSYNTHESIS, MAMMO: HCPCS

## 2025-04-02 PROCEDURE — 77065 DX MAMMO INCL CAD UNI: CPT | Mod: RT

## 2025-04-09 NOTE — TELEPHONE ENCOUNTER
Patient Quality Outreach    Patient is due for the following:   Colon Cancer Screening  IVD  -  LDL (Fasting)  Physical Preventive Adult Physical      Topic Date Due    Hepatitis B Vaccine (1 of 3 - 19+ 3-dose series) Never done    Flu Vaccine (1) 09/01/2024    COVID-19 Vaccine (1 - 2024-25 season) Never done       Action(s) Taken:   Schedule a Adult Preventative    Type of outreach:    Sent letter.    Questions for provider review:    None         Massiel Gomez, VF  Chart routed to None.

## 2025-07-29 SDOH — HEALTH STABILITY: PHYSICAL HEALTH: ON AVERAGE, HOW MANY DAYS PER WEEK DO YOU ENGAGE IN MODERATE TO STRENUOUS EXERCISE (LIKE A BRISK WALK)?: 7 DAYS

## 2025-07-29 ASSESSMENT — SOCIAL DETERMINANTS OF HEALTH (SDOH): HOW OFTEN DO YOU GET TOGETHER WITH FRIENDS OR RELATIVES?: MORE THAN THREE TIMES A WEEK

## 2025-07-30 ENCOUNTER — OFFICE VISIT (OUTPATIENT)
Dept: FAMILY MEDICINE | Facility: CLINIC | Age: 45
End: 2025-07-30
Payer: COMMERCIAL

## 2025-07-30 ENCOUNTER — ORDERS ONLY (AUTO-RELEASED) (OUTPATIENT)
Dept: FAMILY MEDICINE | Facility: CLINIC | Age: 45
End: 2025-07-30

## 2025-07-30 VITALS
BODY MASS INDEX: 27.34 KG/M2 | RESPIRATION RATE: 18 BRPM | HEIGHT: 67 IN | OXYGEN SATURATION: 98 % | SYSTOLIC BLOOD PRESSURE: 117 MMHG | DIASTOLIC BLOOD PRESSURE: 82 MMHG | TEMPERATURE: 97.9 F | HEART RATE: 88 BPM | WEIGHT: 174.2 LBS

## 2025-07-30 DIAGNOSIS — Z13.1 SCREENING FOR DIABETES MELLITUS: ICD-10-CM

## 2025-07-30 DIAGNOSIS — Z12.11 SCREEN FOR COLON CANCER: ICD-10-CM

## 2025-07-30 DIAGNOSIS — Z00.00 ROUTINE GENERAL MEDICAL EXAMINATION AT A HEALTH CARE FACILITY: Primary | ICD-10-CM

## 2025-07-30 DIAGNOSIS — Z13.6 CARDIOVASCULAR SCREENING; LDL GOAL LESS THAN 160: ICD-10-CM

## 2025-07-30 LAB
EST. AVERAGE GLUCOSE BLD GHB EST-MCNC: 94 MG/DL
HBA1C MFR BLD: 4.9 % (ref 0–5.6)

## 2025-07-30 PROCEDURE — 3049F LDL-C 100-129 MG/DL: CPT | Performed by: NURSE PRACTITIONER

## 2025-07-30 PROCEDURE — 3079F DIAST BP 80-89 MM HG: CPT | Performed by: NURSE PRACTITIONER

## 2025-07-30 PROCEDURE — 3074F SYST BP LT 130 MM HG: CPT | Performed by: NURSE PRACTITIONER

## 2025-07-30 PROCEDURE — 36415 COLL VENOUS BLD VENIPUNCTURE: CPT | Performed by: NURSE PRACTITIONER

## 2025-07-30 PROCEDURE — 80048 BASIC METABOLIC PNL TOTAL CA: CPT | Performed by: NURSE PRACTITIONER

## 2025-07-30 PROCEDURE — 1126F AMNT PAIN NOTED NONE PRSNT: CPT | Performed by: NURSE PRACTITIONER

## 2025-07-30 PROCEDURE — 3044F HG A1C LEVEL LT 7.0%: CPT | Performed by: NURSE PRACTITIONER

## 2025-07-30 PROCEDURE — 83036 HEMOGLOBIN GLYCOSYLATED A1C: CPT | Performed by: NURSE PRACTITIONER

## 2025-07-30 PROCEDURE — 99396 PREV VISIT EST AGE 40-64: CPT | Performed by: NURSE PRACTITIONER

## 2025-07-30 PROCEDURE — 80061 LIPID PANEL: CPT | Performed by: NURSE PRACTITIONER

## 2025-07-30 ASSESSMENT — PAIN SCALES - GENERAL: PAINLEVEL_OUTOF10: NO PAIN (0)

## 2025-07-30 NOTE — PROGRESS NOTES
"Preventive Care Visit  [facility]  ARIK Balderrama CNP, Family Medicine  Jul 30, 2025      Assessment & Plan     Routine general medical examination at a health care facility  -next preventative care visit in one year.     Screen for colon cancer    - [place]([employer]); Future    CARDIOVASCULAR SCREENING; LDL GOAL LESS THAN 160  -bp still within normal range, discussed possible causes for rise in bp including age, stress, lack of sleep. Will check renal function as well today.   - Lipid panel reflex to direct LDL Non-fasting  - Basic metabolic panel  (Ca, Cl, CO2, Creat, Gluc, K, Na, BUN)    Screening for diabetes mellitus    - Hemoglobin A1c      BMI  Estimated body mass index is 27.28 kg/m  as calculated from the following:    Height as of this encounter: 1.702 m (5' 7\").    Weight as of this encounter: 79 kg (174 lb 3.2 oz).       Counseling  Appropriate preventive services were addressed with this patient via screening, questionnaire, or discussion as appropriate for fall prevention, nutrition, physical activity, Tobacco-use cessation, social engagement, weight loss and cognition.  Checklist reviewing preventive services available has been given to the patient.  Reviewed patient's diet, addressing concerns and/or questions.           Chanel Rossi is a 45 year old, presenting for the following:  Physical        7/30/2025    11:33 AM   Additional Questions   Roomed by Ruma HOYT   Accompanied by self     Patient here for yearly preventative care visit. In addition, they have the following concerns:    1. Has noticed increase in bp readings, usually had low bp readings and has noticed creeping up to 120s. No family history of high blood pressure.       [facility]. Did Mounjaro for 3 months. Exercise: has bone spur in heel, if she tries to walk or run gets lots of pain. Taking multivitamin, omega 3, and magnesium.     Menses monthly. Had IUD In " February.           Advance Care Planning    Discussed advance care planning with patient; informed AVS has link to [employer].        7/29/2025   General Health   How would you rate your overall physical health? Good   Feel stress (tense, anxious, or unable to sleep) Not at all         7/29/2025   Nutrition   Three or more servings of calcium each day? Yes   Diet: Other   If other, please elaborate: High protein, low calorie   How many servings of fruit and vegetables per day? 4 or more   How many sweetened beverages each day? 0-1         7/29/2025   Exercise   Days per week of moderate/strenous exercise 7 days         7/29/2025   Social Factors   Frequency of gathering with friends or relatives More than three times a week   Worry food won't last until get money to buy more No   Food not last or not have enough money for food? No   Do you have housing? (Housing is defined as stable permanent housing and does not include staying outside in a car, in a tent, in an abandoned building, in an overnight shelter, or couch-surfing.) Yes   Are you worried about losing your housing? No   Lack of transportation? No   Unable to get utilities (heat,electricity)? No         7/29/2025   Dental   Dentist two times every year? Yes         Today's PHQ-2 Score:       4/1/2025     9:52 AM   PHQ-2 ( 1999 Pfizer)   Q1: Little interest or pleasure in doing things 0   Q2: Feeling down, depressed or hopeless 0   PHQ-2 Score 0         7/29/2025   Substance Use   Alcohol more than 3/day or more than 7/wk No   Do you use any other substances recreationally? No     Social History     Tobacco Use    Smoking status: Never    Smokeless tobacco: Never   Vaping Use    Vaping status: Never Used   Substance Use Topics    Alcohol use: No    Drug use: No           4/2/2025   LAST FHS-7 RESULTS   1st degree relative breast or ovarian cancer Yes   Any relative bilateral breast cancer No   Any male have breast cancer No   Any ONE woman have BOTH  "breast AND ovarian cancer No   Any woman with breast cancer before 50yrs No   2 or more relatives with breast AND/OR ovarian cancer No   2 or more relatives with breast AND/OR bowel cancer No        Mammogram Screening - Mammogram every 1-2 years updated in Health Maintenance based on mutual decision making        7/29/2025   STI Screening   New sexual partner(s) since last STI/HIV test? No     History of abnormal Pap smear: No - age 30- 64 PAP with HPV every 5 years recommended        Latest Ref Rng & Units 3/8/2018     4:52 PM 3/8/2018     4:30 PM 8/12/2014    12:00 AM   PAP / HPV   PAP (Historical)  NIL   NIL    HPV 16 DNA NEG^Negative  Negative     HPV 18 DNA NEG^Negative  Negative     Other HR HPV NEG^Negative  Negative       ASCVD Risk   The ASCVD Risk score (Britni DAVIDSON, et al., 2019) failed to calculate for the following reasons:    Cannot find a previous HDL lab    Cannot find a previous total cholesterol lab        7/29/2025   Contraception/Family Planning   Questions about contraception or family planning No   What are your periods like? Light Flow        Reviewed and updated as needed this visit by Provider   Tobacco  Allergies  Meds  Problems  Med Hx  Surg Hx  Fam Hx  Soc   Hx Sexual Activity                   Objective    Exam  /82   Pulse 88   Temp 97.9  F (36.6  C) (Tympanic)   Resp 18   Ht 1.702 m (5' 7\")   Wt 79 kg (174 lb 3.2 oz)   LMP 07/09/2025 (Approximate)   SpO2 98%   BMI 27.28 kg/m     Estimated body mass index is 27.28 kg/m  as calculated from the following:    Height as of this encounter: 1.702 m (5' 7\").    Weight as of this encounter: 79 kg (174 lb 3.2 oz).    Physical Exam  Constitutional:       Appearance: Normal appearance. She is not ill-appearing.   HENT:      Right Ear: Tympanic membrane, ear canal and external ear normal.      Left Ear: Tympanic membrane, ear canal and external ear normal.      Nose: Nose normal. No congestion.      Mouth/Throat:      " Mouth: Mucous membranes are moist.      Pharynx: Oropharynx is clear.   Eyes:      Pupils: Pupils are equal, round, and reactive to light.   Cardiovascular:      Rate and Rhythm: Normal rate and regular rhythm.      Pulses: Normal pulses.      Heart sounds: Normal heart sounds. No murmur heard.     No friction rub. No gallop.   Pulmonary:      Effort: Pulmonary effort is normal.      Breath sounds: Normal breath sounds.   Abdominal:      General: Abdomen is flat. Bowel sounds are normal.      Palpations: Abdomen is soft.   Musculoskeletal:         General: Normal range of motion.      Cervical back: Normal range of motion.   Lymphadenopathy:      Cervical: No cervical adenopathy.   Skin:     General: Skin is warm and dry.   Neurological:      General: No focal deficit present.      Mental Status: She is alert and oriented to person, place, and time.   Psychiatric:         Mood and Affect: Mood normal.         Behavior: Behavior normal.         Thought Content: Thought content normal.         Judgment: Judgment normal.               Signed Electronically by: ARIK Balderrama CNP

## 2025-07-30 NOTE — PATIENT INSTRUCTIONS
"Patient Education   Preventive Care Advice   This is general advice we often give to help people stay healthy. Your care team may have specific advice just for you. Please talk to your care team about your own preventive care needs.  Lifestyle  Exercise at least 150 minutes each week (30 minutes a day, 5 days a week).  Do muscle strengthening activities 2 days a week. These help control your weight and prevent disease.  No smoking.  Wear sunscreen to prevent skin cancer.  Take time with family and friends.  Have your home tested for radon every 2 to 5 years. Radon is a colorless, odorless gas that can harm your lungs. To learn more, go to www.health.[facility].[place].us and search for \"Radon in Homes.\"  Keep guns unloaded and locked up in a safe place like a safe or gun vault, or, use a gun lock and hide the keys. Always lock away bullets separately. To learn more, visit [employer].[place].gov and search for \"safe gun storage.\"  Nutrition  Eat 5 or more servings of fruits and vegetables each day.  Try wheat bread, brown rice and whole grain pasta (instead of white bread, rice, and pasta).  Get enough calcium and vitamin D. Check the label on foods and aim for 100% of the RDA (recommended daily allowance).  Regular exams  Have a dental exam and cleaning every 6 months.  Older adults: Ask your care team how often to have memory testing.  See your health care team every year to talk about:  Any changes in your health.  Any medicines your care team has prescribed.  Preventive care, family planning, and ways to prevent chronic diseases.  Shots (vaccines)   HPV shots (up to age 26), if you've never had them before.  Hepatitis B shots (up to age 59), if you've never had them before.  COVID-19 shot: Get this shot when it's due.  Flu shot: Get a flu shot every year.  Tetanus shot: Get a tetanus shot every 10 years.  Pneumococcal, hepatitis A, and RSV shots: Ask your care team if you need these based on your risk.  Shingles shot (for age 50 and " up).  General health tests  Diabetes screening:  Starting at age 35, Get screened for diabetes at least every 3 years.  If you are younger than age 35, ask your care team if you should be screened for diabetes.  Cholesterol test: At age 39, start having a cholesterol test every 5 years, or more often if advised.  Bone density scan (DEXA): At age 50, ask your care team if you should have this scan for osteoporosis (brittle bones).  Hepatitis C: Get tested at least once in your life.  Abdominal aortic aneurysm screening: Talk to your doctor about having this screening if you:  Have ever smoked; and  Are biologically male; and  Are between the ages of 65 and 75.  STIs (sexually transmitted infections)  Before age 24: Ask your care team if you should be screened for STIs.  After age 24: Get screened for STIs if you're at risk. You are at risk for STIs (including HIV) if:  You are sexually active with more than one person.  You don't use condoms every time.  You or a partner was diagnosed with a sexually transmitted infection.  If you are at risk for HIV, ask about PrEP medicine to prevent HIV.  Get tested for HIV at least once in your life, whether you are at risk for HIV or not.  Cancer screening tests  Cervical cancer screening: If you have a cervix, begin getting regular cervical cancer screening tests at age 21. Most people who have regular screenings with normal results can stop after age 65. Talk about this with your provider.  Breast cancer scan (mammogram): If you've ever had breasts, begin having regular mammograms starting at age 40. This is a scan to check for breast cancer.  Colon cancer screening: It is important to start screening for colon cancer at age 45.  Have a colonoscopy test every 10 years (or more often if you're at risk) Or, ask your provider about stool tests like a FIT test every year or Cologuard test every 3 years.  To learn more about your testing options, visit:  www.[employer]/911506.pdf.  For help making a decision, visit: oymi.jerrell/gv30144.  Prostate cancer screening test: If you have a prostate and are age 55 to 69, ask your provider if you would benefit from a yearly prostate cancer screening test.  Lung cancer screening: If you are a current or former smoker age 50 to 80, ask your care team if ongoing lung cancer screenings are right for you.    For informational purposes only. Not to replace the advice of your health care provider. Copyright   2023 [facility]. All rights reserved. Clinically reviewed by the [facility]. [employer] [id number] - REV 6/25.

## 2025-07-31 LAB
ANION GAP SERPL CALCULATED.3IONS-SCNC: 10 MMOL/L (ref 7–15)
BUN SERPL-MCNC: 14.2 MG/DL (ref 6–20)
CALCIUM SERPL-MCNC: 9.6 MG/DL (ref 8.8–10.4)
CHLORIDE SERPL-SCNC: 105 MMOL/L (ref 98–107)
CHOLEST SERPL-MCNC: 184 MG/DL
CREAT SERPL-MCNC: 0.82 MG/DL (ref 0.51–0.95)
EGFRCR SERPLBLD CKD-EPI 2021: 89 ML/MIN/1.73M2
FASTING STATUS PATIENT QL REPORTED: NO
FASTING STATUS PATIENT QL REPORTED: NO
GLUCOSE SERPL-MCNC: 93 MG/DL (ref 70–99)
HCO3 SERPL-SCNC: 24 MMOL/L (ref 22–29)
HDLC SERPL-MCNC: 48 MG/DL
LDLC SERPL CALC-MCNC: 118 MG/DL
NONHDLC SERPL-MCNC: 136 MG/DL
POTASSIUM SERPL-SCNC: 4.3 MMOL/L (ref 3.4–5.3)
SODIUM SERPL-SCNC: 139 MMOL/L (ref 135–145)
TRIGL SERPL-MCNC: 91 MG/DL

## (undated) DEVICE — STOCKING SLEEVE COMPRESSION CALF LG

## (undated) DEVICE — ESU GROUND PAD ADULT W/CORD E7507

## (undated) DEVICE — CATH TRAY FOLEY 16FR SILICONE 907416

## (undated) DEVICE — DRAPE SETUP C-SECTION INVISISHIELD 54X90" DYNJE5600

## (undated) DEVICE — DRSG PRIMAPORE 03 1/8X6" 66000318

## (undated) DEVICE — SU VICRYL 3-0 CTX 36" UND J980H

## (undated) DEVICE — GLOVE BIOGEL PI MICRO SZ 8.0 48580

## (undated) DEVICE — SU ETHILON 4-0 PS-2 18" BLACK 1667H

## (undated) DEVICE — DRSG TELFA 3X8" 1238

## (undated) DEVICE — SUCTION MANIFOLD NEPTUNE 2 SYS 1 PORT 702-025-000

## (undated) DEVICE — GLOVE ESTEEM POWDER FREE SMT 6.5  2D72PT65

## (undated) DEVICE — SOL NACL 0.9% IRRIG 1000ML BOTTLE 07138-09

## (undated) DEVICE — BASIN SET MAJOR

## (undated) DEVICE — SUCTION CANISTER MEDIVAC LINER 1500ML W/LID 65651-515

## (undated) DEVICE — LINEN TOWEL PACK X5 5464

## (undated) DEVICE — SOL WATER IRRIG 1000ML BOTTLE 07139-09

## (undated) DEVICE — SOL WATER IRRIG 500ML BOTTLE 2F7113

## (undated) DEVICE — DRSG JAWBRA  95

## (undated) DEVICE — STRAP KNEE/BODY 31143004

## (undated) DEVICE — PREP CHLORAPREP 26ML TINTED ORANGE  260815

## (undated) DEVICE — SURGICEL ABSORBABLE HEMOSTAT SNOW 4"X4" 2083

## (undated) DEVICE — SU SILK 4-0 TIE 12X30" A303H

## (undated) DEVICE — DRAIN JACKSON PRATT RESERVOIR 100ML SU130-1305

## (undated) DEVICE — SU SILK 2-0 SH 30" K833H

## (undated) DEVICE — SU VICRYL 3-0 PS-2 27" UND J427H

## (undated) DEVICE — BARRIER SEPRAFILM 5X6" SINGLE SHEET 4301-02

## (undated) DEVICE — RETR ELASTIC STAYS LONE STAR BLUNT DUAL LEAD 3550-1G

## (undated) DEVICE — ESU GROUND PAD UNIVERSAL W/O CORD

## (undated) DEVICE — SU VICRYL 0 CT-1 36" J346H

## (undated) DEVICE — BLADE KNIFE SURG 10 371110

## (undated) DEVICE — ESU LIGASURE DISSECTOR EXACT LF2019

## (undated) DEVICE — DRSG STERI STRIP 1/4X3" R1541

## (undated) DEVICE — BNDG ABDOMINAL BINDER 10X26-50" 08140145

## (undated) DEVICE — NIM ELEC SUBDERMAL NDL 3PAIR/BOX

## (undated) DEVICE — SU VICRYL 3-0 SH 8X18" UND J864D

## (undated) DEVICE — SU MONOCRYL 4-0 PS-2 18" UND Y496G

## (undated) DEVICE — SPONGE KITTNER 30-101

## (undated) DEVICE — DRSG TEGADERM 2 3/8X2 3/4" 1624W

## (undated) DEVICE — DRSG KERLIX FLUFFS X5

## (undated) DEVICE — PACK ENT MINOR CUSTOM ASC

## (undated) DEVICE — NIM PROBE NS STD INCR PRASS TIP STRL LF DISP 8225825X

## (undated) DEVICE — SU SILK 3-0 TIE 12X30" A304H

## (undated) DEVICE — SU SILK 2-0 TIE 12X30" A305H

## (undated) DEVICE — PACK C-SECTION LF PL15OTA83B

## (undated) DEVICE — CLIP HORIZON MED BLUE 002200

## (undated) DEVICE — SOL ADH LIQUID BENZOIN SWAB 0.6ML C1544

## (undated) DEVICE — SOL NACL 0.9% IRRIG 500ML BOTTLE 2F7123

## (undated) DEVICE — CLIP HORIZON SM RED WIDE SLOT 001201

## (undated) DEVICE — GLOVE PROTEXIS BLUE W/NEU-THERA 7.0  2D73EB70

## (undated) DEVICE — DRAIN JACKSON PRATT 10MM FLAT 4/4 PERF SU130-1311

## (undated) DEVICE — TRAY PREP DRY SKIN SCRUB 067

## (undated) DEVICE — ESU ELEC BLADE 2.75" COATED/INSULATED E1455

## (undated) RX ORDER — LIDOCAINE HYDROCHLORIDE AND EPINEPHRINE 10; 10 MG/ML; UG/ML
INJECTION, SOLUTION INFILTRATION; PERINEURAL
Status: DISPENSED
Start: 2024-10-21

## (undated) RX ORDER — OXYCODONE HYDROCHLORIDE 5 MG/1
TABLET ORAL
Status: DISPENSED
Start: 2024-10-21

## (undated) RX ORDER — ONDANSETRON 2 MG/ML
INJECTION INTRAMUSCULAR; INTRAVENOUS
Status: DISPENSED
Start: 2024-10-21

## (undated) RX ORDER — CHLORHEXIDINE GLUCONATE ORAL RINSE 1.2 MG/ML
SOLUTION DENTAL
Status: DISPENSED
Start: 2024-10-21

## (undated) RX ORDER — PROPOFOL 10 MG/ML
INJECTION, EMULSION INTRAVENOUS
Status: DISPENSED
Start: 2024-10-21

## (undated) RX ORDER — GLYCOPYRROLATE 0.2 MG/ML
INJECTION INTRAMUSCULAR; INTRAVENOUS
Status: DISPENSED
Start: 2024-10-21

## (undated) RX ORDER — DEXAMETHASONE SODIUM PHOSPHATE 4 MG/ML
INJECTION, SOLUTION INTRA-ARTICULAR; INTRALESIONAL; INTRAMUSCULAR; INTRAVENOUS; SOFT TISSUE
Status: DISPENSED
Start: 2024-10-21

## (undated) RX ORDER — FENTANYL CITRATE 50 UG/ML
INJECTION, SOLUTION INTRAMUSCULAR; INTRAVENOUS
Status: DISPENSED
Start: 2018-07-23

## (undated) RX ORDER — FENTANYL CITRATE 50 UG/ML
INJECTION, SOLUTION INTRAMUSCULAR; INTRAVENOUS
Status: DISPENSED
Start: 2024-10-21

## (undated) RX ORDER — ACETAMINOPHEN 325 MG/1
TABLET ORAL
Status: DISPENSED
Start: 2024-10-21